# Patient Record
Sex: FEMALE | Race: WHITE | URBAN - METROPOLITAN AREA
[De-identification: names, ages, dates, MRNs, and addresses within clinical notes are randomized per-mention and may not be internally consistent; named-entity substitution may affect disease eponyms.]

---

## 2023-06-23 ENCOUNTER — OFFICE VISIT (OUTPATIENT)
Dept: GERIATRIC MEDICINE | Age: 63
End: 2023-06-23
Payer: MEDICARE

## 2023-06-23 DIAGNOSIS — R53.1 WEAKNESS: ICD-10-CM

## 2023-06-23 DIAGNOSIS — I10 PRIMARY HYPERTENSION: ICD-10-CM

## 2023-06-23 DIAGNOSIS — E03.9 HYPOTHYROIDISM, UNSPECIFIED TYPE: Primary | ICD-10-CM

## 2023-06-23 DIAGNOSIS — Z79.4 UNCONTROLLED TYPE 2 DIABETES MELLITUS WITH HYPERGLYCEMIA, WITH LONG-TERM CURRENT USE OF INSULIN (HCC): ICD-10-CM

## 2023-06-23 DIAGNOSIS — E11.65 UNCONTROLLED TYPE 2 DIABETES MELLITUS WITH HYPERGLYCEMIA, WITH LONG-TERM CURRENT USE OF INSULIN (HCC): ICD-10-CM

## 2023-06-23 PROCEDURE — 3046F HEMOGLOBIN A1C LEVEL >9.0%: CPT | Performed by: INTERNAL MEDICINE

## 2023-06-23 PROCEDURE — 99305 1ST NF CARE MODERATE MDM 35: CPT | Performed by: INTERNAL MEDICINE

## 2023-06-23 RX ORDER — LEVOTHYROXINE SODIUM 0.07 MG/1
75 TABLET ORAL DAILY
COMMUNITY

## 2023-06-23 RX ORDER — CHOLECALCIFEROL (VITAMIN D3) 50 MCG
2000 TABLET ORAL DAILY
COMMUNITY

## 2023-06-23 RX ORDER — LISINOPRIL 20 MG/1
20 TABLET ORAL DAILY
COMMUNITY

## 2023-06-23 RX ORDER — METOPROLOL SUCCINATE 25 MG/1
25 TABLET, EXTENDED RELEASE ORAL DAILY
COMMUNITY

## 2023-06-23 RX ORDER — AMITRIPTYLINE HYDROCHLORIDE 10 MG/1
5 TABLET, FILM COATED ORAL NIGHTLY
COMMUNITY

## 2023-06-23 RX ORDER — INSULIN GLARGINE 100 [IU]/ML
10 INJECTION, SOLUTION SUBCUTANEOUS EVERY MORNING
COMMUNITY

## 2023-06-23 RX ORDER — INSULIN LISPRO 100 [IU]/ML
2 INJECTION, SOLUTION INTRAVENOUS; SUBCUTANEOUS
COMMUNITY

## 2023-06-23 RX ORDER — CIPROFLOXACIN 500 MG/1
500 TABLET, FILM COATED ORAL 2 TIMES DAILY
COMMUNITY
Start: 2023-06-23 | End: 2023-06-24

## 2023-06-23 RX ORDER — GLIMEPIRIDE 4 MG/1
4 TABLET ORAL 2 TIMES DAILY
COMMUNITY

## 2023-06-23 RX ORDER — ATORVASTATIN CALCIUM 10 MG/1
10 TABLET, FILM COATED ORAL NIGHTLY
COMMUNITY

## 2023-06-26 LAB
AVERAGE GLUCOSE: 301
BASOPHILS ABSOLUTE: NORMAL
BASOPHILS RELATIVE PERCENT: 0.6 %
BUN BLDV-MCNC: 16 MG/DL
CALCIUM SERPL-MCNC: 9.1 MG/DL
CHLORIDE BLD-SCNC: 102 MMOL/L
CO2: 24 MMOL/L
CREAT SERPL-MCNC: 0.6 MG/DL
EGFR: NORMAL
EOSINOPHILS ABSOLUTE: 0.1 /ΜL
EOSINOPHILS RELATIVE PERCENT: 1.3 %
GLUCOSE BLD-MCNC: 160 MG/DL
HBA1C MFR BLD: 12.1 %
HCT VFR BLD CALC: 40.1 % (ref 36–46)
HEMOGLOBIN: 13.3 G/DL (ref 12–16)
LYMPHOCYTES ABSOLUTE: 3.2 /ΜL
LYMPHOCYTES RELATIVE PERCENT: 40.4 %
MCH RBC QN AUTO: 30.8 PG
MCHC RBC AUTO-ENTMCNC: 33.3 G/DL
MCV RBC AUTO: 92.4 FL
MONOCYTES ABSOLUTE: 0.5 /ΜL
MONOCYTES RELATIVE PERCENT: 6.4 %
NEUTROPHILS ABSOLUTE: 4 /ΜL
NEUTROPHILS RELATIVE PERCENT: 51.3 %
PLATELET # BLD: 355 K/ΜL
PMV BLD AUTO: 8.5 FL
POTASSIUM SERPL-SCNC: 3.9 MMOL/L
RBC # BLD: 4.33 10^6/ΜL
SODIUM BLD-SCNC: 139 MMOL/L
WBC # BLD: 7.8 10^3/ML

## 2023-07-03 ENCOUNTER — OFFICE VISIT (OUTPATIENT)
Dept: GERIATRIC MEDICINE | Age: 63
End: 2023-07-03
Payer: MEDICARE

## 2023-07-03 DIAGNOSIS — E03.9 HYPOTHYROIDISM, UNSPECIFIED TYPE: ICD-10-CM

## 2023-07-03 DIAGNOSIS — Z79.4 UNCONTROLLED TYPE 2 DIABETES MELLITUS WITH HYPERGLYCEMIA, WITH LONG-TERM CURRENT USE OF INSULIN (HCC): ICD-10-CM
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
## 2023-07-03 DIAGNOSIS — I10 PRIMARY HYPERTENSION: ICD-10-CM

## 2023-07-03 DIAGNOSIS — R53.1 WEAKNESS: ICD-10-CM

## 2023-07-03 DIAGNOSIS — N17.9 AKI (ACUTE KIDNEY INJURY) (HCC): Primary | ICD-10-CM

## 2023-07-03 DIAGNOSIS — E11.65 UNCONTROLLED TYPE 2 DIABETES MELLITUS WITH HYPERGLYCEMIA, WITH LONG-TERM CURRENT USE OF INSULIN (HCC): ICD-10-CM

## 2023-07-03 PROCEDURE — 99309 SBSQ NF CARE MODERATE MDM 30: CPT | Performed by: NURSE PRACTITIONER

## 2023-07-03 PROCEDURE — 3046F HEMOGLOBIN A1C LEVEL >9.0%: CPT | Performed by: NURSE PRACTITIONER

## 2023-07-10 PROBLEM — N17.9 AKI (ACUTE KIDNEY INJURY) (HCC): Status: ACTIVE | Noted: 2023-07-10

## 2023-07-10 PROBLEM — Z79.4 UNCONTROLLED TYPE 2 DIABETES MELLITUS WITH HYPERGLYCEMIA, WITH LONG-TERM CURRENT USE OF INSULIN (HCC): Status: ACTIVE | Noted: 2023-07-10

## 2023-07-10 PROBLEM — R53.1 WEAKNESS: Status: ACTIVE | Noted: 2023-07-10

## 2023-07-10 PROBLEM — N30.01 ACUTE CYSTITIS WITH HEMATURIA: Status: ACTIVE | Noted: 2023-07-10
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
## 2023-07-10 PROBLEM — E03.9 HYPOTHYROIDISM: Status: ACTIVE | Noted: 2023-07-10

## 2023-07-10 PROBLEM — E11.40 POORLY CONTROLLED TYPE 2 DIABETES MELLITUS WITH NEUROPATHY (HCC): Status: ACTIVE | Noted: 2023-07-10

## 2023-07-10 PROBLEM — I10 PRIMARY HYPERTENSION: Status: ACTIVE | Noted: 2023-07-10

## 2023-07-10 PROBLEM — E11.65 UNCONTROLLED TYPE 2 DIABETES MELLITUS WITH HYPERGLYCEMIA, WITHOUT LONG-TERM CURRENT USE OF INSULIN (HCC): Status: ACTIVE | Noted: 2023-07-10

## 2023-07-10 PROBLEM — E11.65 POORLY CONTROLLED TYPE 2 DIABETES MELLITUS WITH NEUROPATHY (HCC): Status: ACTIVE | Noted: 2023-07-10

## 2023-07-10 ASSESSMENT — ENCOUNTER SYMPTOMS
COUGH: 0
BACK PAIN: 1
CONSTIPATION: 1

## 2023-07-10 NOTE — PROGRESS NOTES
[address] [facility]. [place], [address] [facility]    7/3/2023    Tucker Feliz  is a 60 y.o. in the NF being seen for a f/u of   Chief Complaint   Patient presents with    Other     F/u new pt in rehab        HPI  The pt is here for rehab after their hospitalization 6/21 - 7/3 for JACOB, freq falls, diabetic neuropathy, UTI, weakness. Had MDI started in hospital. L 10u hs;  H 2u tid meals   She insists she is on lyrica. No response from Dr Regis Knight, will have nursing try to contact for orders on Lyrica. Palliative STOPPED the Lyrica several months ago. She also uses TENs unit for pain. They are up in wheel chair but using walker for 200' ambulation with therapy. And she states she has neuropathy pain in legs. Does not know dose or frequency. Has imbalance issues. They are eating and drinking OK per nursing. Have had no recent choking or dysphagia issues. NO agitation or unusual mental behavioral issues. PT reports they are progressing with ambulation. OT reports they are progressing with ADLs. Family supportive. Plans to go home after rehab. No past medical history on file. No past surgical history on file. No family history on file.   Social History     Socioeconomic History    Marital status:      Spouse name: Not on file    Number of children: Not on file    Years of education: Not on file    Highest education level: Not on file   Occupational History    Not on file   Tobacco Use    Smoking status: Not on file    Smokeless tobacco: Not on file   Substance and Sexual Activity    Alcohol use: Not on file    Drug use: Not on file    Sexual activity: Not on file   Other Topics Concern    Not on file   Social History Narrative    Not on file     Social Determinants of Health     Financial Resource Strain: Not on file   Food Insecurity: Not on file   Transportation Needs: Not on file   Physical Activity: Not on file   Stress: Not on file   Social

## 2023-07-11 NOTE — PROGRESS NOTES
Patient Name: Jarocho Kaiser    YOB: 1960  Medical Record Number: [id number]      History of Present Illness: This 58-year-old was admitted here after recent hospitalization with diabetes with neuropathy hypertension hypothyroidism. Patient recurrent falls weakness patient has any chest pain palpitation patient going consideration of pain patient will follow left hip was imaged for possible acute fracture. Patient did not have acute fracture on imaging. Patient blood sugar mildly elevated and A1c was above target. Patient is up titration of therapy including adding course of Lantus patient was stabilized transferred for ongoing course of care General debility weakness. Ongoing joint pain at this time. Review of Systems   Constitutional:  Positive for activity change and fatigue. HENT:  Negative for congestion. Respiratory:  Negative for cough. Cardiovascular:  Positive for leg swelling. Negative for chest pain. Gastrointestinal:  Positive for constipation. Musculoskeletal:  Positive for arthralgias, back pain, gait problem, joint swelling and myalgias. Neurological:  Positive for weakness. All other systems reviewed and are negative. Review of Systems: All 14 review of systems negative other than as stated above           No past medical history on file. No past surgical history on file. Current Outpatient Medications on File Prior to Visit   Medication Sig Dispense Refill    amitriptyline (ELAVIL) 10 MG tablet Take 0.5 tablets by mouth nightly Indications: Depression      atorvastatin (LIPITOR) 10 MG tablet Take 1 tablet by mouth nightly Indications: High Amount of Fats in the Blood      glimepiride (AMARYL) 4 MG tablet Take 1 tablet by mouth in the morning and 1 tablet in the evening. Indications: Type 2 Diabetes.       insulin glargine (LANTUS) 100 UNIT/ML injection vial Inject 10 Units into the skin every morning Indications: Type 2 Diabetes      insulin

## 2023-07-13 ENCOUNTER — OFFICE VISIT (OUTPATIENT)
Dept: GERIATRIC MEDICINE | Age: 63
End: 2023-07-13
Payer: MEDICARE

## 2023-07-13 DIAGNOSIS — F03.911 AGITATION DUE TO DEMENTIA (HCC): ICD-10-CM

## 2023-07-13 DIAGNOSIS — G62.9 NEUROPATHY: ICD-10-CM

## 2023-07-13 PROCEDURE — G0317 PR PROLONG NURSIN FAC EVAL 15M: HCPCS | Performed by: NURSE PRACTITIONER

## 2023-07-13 PROCEDURE — 99310 SBSQ NF CARE HIGH MDM 45: CPT | Performed by: NURSE PRACTITIONER

## 2023-07-18 PROBLEM — F03.911 AGITATION DUE TO DEMENTIA (HCC): Status: ACTIVE | Noted: 2023-07-18

## 2023-07-18 PROBLEM — G62.9 NEUROPATHY: Status: ACTIVE | Noted: 2023-07-18

## 2023-07-18 RX ORDER — PREGABALIN 50 MG/1
50 CAPSULE ORAL 2 TIMES DAILY
Qty: 60 CAPSULE | Refills: 2
Start: 2023-07-18 | End: 2023-10-16

## 2023-07-18 NOTE — PROGRESS NOTES
[address] [facility] [place]. [place], [place] [facility]    7/13/2023    Matty Morales  is a 60 y.o. in the [place] being seen for    Chief Complaint   Patient presents with    Medication Problem       HPI patient was admitted for rehabilitation now she is here for long-term care because of her dementia she is incontinent of bowel and bladder has neuropathy in her feet from her diabetes  She is agitated at times use forgetful at times  Nursing reported patient drove to the physician's office Dr. Mary Hanna obtained Lyrica prescription and she told them she filled the prescription and would be taking it on her own because it was not ordered  Orders were written last week for nursing to contact the physician office for clarification of the Lyrica because patient does not remember the dosage  Patient is now denying that she obtained the prescription she is calling me a liar then she was swearing at me and she was accusing me of calling her a liar she is very agitated irritable  I instructed the patient I am happy to talk to the physician's office I am happy to talk to the pharmacy  When I contacted drug Alaina Serna they indicated that there is no prescription patient was not on Lyrica  Upon further evaluation they states she was on Lyrica back in March and has not received a prescription since then I did run an [facility] report and it was noted that she received her prescription back in March and nothing since then  Also [facility] report today did not indicate that patient had any Lyrica filled    History reviewed. No pertinent past medical history. History reviewed. No pertinent surgical history. History reviewed. No pertinent family history.   Social History     Socioeconomic History    Marital status:      Spouse name: Not on file    Number of children: Not on file    Years of education: Not on file    Highest education level: Not on file   Occupational History    Not on file   Tobacco Use    Smoking status:

## 2023-07-21 ENCOUNTER — OFFICE VISIT (OUTPATIENT)
Dept: GERIATRIC MEDICINE | Age: 63
End: 2023-07-21

## 2023-07-21 DIAGNOSIS — R53.1 WEAKNESS: ICD-10-CM

## 2023-07-21 DIAGNOSIS — E11.40 POORLY CONTROLLED TYPE 2 DIABETES MELLITUS WITH NEUROPATHY (HCC): ICD-10-CM

## 2023-07-21 DIAGNOSIS — I10 PRIMARY HYPERTENSION: Primary | ICD-10-CM

## 2023-07-21 DIAGNOSIS — E11.65 POORLY CONTROLLED TYPE 2 DIABETES MELLITUS WITH NEUROPATHY (HCC): ICD-10-CM

## 2023-07-21 DIAGNOSIS — F03.911 AGITATION DUE TO DEMENTIA (HCC): ICD-10-CM

## 2023-07-24 ENCOUNTER — OFFICE VISIT (OUTPATIENT)
Dept: GERIATRIC MEDICINE | Age: 63
End: 2023-07-24
Payer: MEDICARE

## 2023-07-24 DIAGNOSIS — R11.0 NAUSEA: Primary | ICD-10-CM

## 2023-07-24 DIAGNOSIS — E11.65 POORLY CONTROLLED TYPE 2 DIABETES MELLITUS WITH NEUROPATHY (HCC): ICD-10-CM

## 2023-07-24 DIAGNOSIS — E11.40 POORLY CONTROLLED TYPE 2 DIABETES MELLITUS WITH NEUROPATHY (HCC): ICD-10-CM

## 2023-07-24 PROCEDURE — 99309 SBSQ NF CARE MODERATE MDM 30: CPT | Performed by: NURSE PRACTITIONER

## 2023-07-24 PROCEDURE — 3046F HEMOGLOBIN A1C LEVEL >9.0%: CPT | Performed by: NURSE PRACTITIONER

## 2023-07-26 NOTE — PROGRESS NOTES
Activity: Not on file   Stress: Not on file   Social Connections: Not on file   Intimate Partner Violence: Not on file   Housing Stability: Not on file       Allergies: Codeine  NF MEDICATIONS REVIEWED AND ALLERGIES REVIEWED IN NF CHART  Current Outpatient Medications on File Prior to Visit   Medication Sig Dispense Refill    pregabalin (LYRICA) 50 MG capsule Take 1 capsule by mouth 2 times daily for 90 days. Max Daily Amount: 100 mg 60 capsule 2    amitriptyline (ELAVIL) 10 MG tablet Take 0.5 tablets by mouth nightly Indications: Depression      atorvastatin (LIPITOR) 10 MG tablet Take 1 tablet by mouth nightly Indications: High Amount of Fats in the Blood      glimepiride (AMARYL) 4 MG tablet Take 1 tablet by mouth in the morning and 1 tablet in the evening. Indications: Type 2 Diabetes. insulin glargine (LANTUS) 100 UNIT/ML injection vial Inject 10 Units into the skin every morning Indications: Type 2 Diabetes      insulin lispro (HUMALOG) 100 UNIT/ML SOLN injection vial Inject 2 Units into the skin 3 times daily (with meals) Indications: Type 2 Diabetes      levothyroxine (SYNTHROID) 75 MCG tablet Take 1 tablet by mouth Daily Indications: Underactive Thyroid      lisinopril (PRINIVIL;ZESTRIL) 20 MG tablet Take 1 tablet by mouth daily Indications: High Blood Pressure Disorder      metFORMIN (GLUCOPHAGE) 500 MG tablet Take 1 tablet by mouth 2 times daily (with meals) Indications: Type 2 Diabetes      metoprolol succinate (TOPROL XL) 25 MG extended release tablet Take 1 tablet by mouth daily Indications: High Blood Pressure Disorder      vitamin D (CHOLECALCIFEROL) 50 MCG (2000 UT) TABS tablet Take 1 tablet by mouth daily Indications: Nutritional Support       No current facility-administered medications on file prior to visit. ROS:   Constitutional: There are no recent reports of behavioral issues,  ate less x 3 days with nausea.     Respiratory: denies SOB  Cardiovascular: denies CP  GI/:

## 2023-07-27 ENCOUNTER — HOSPITAL ENCOUNTER (EMERGENCY)
Age: 63
Discharge: OTHER INSTITUTION | End: 2023-07-28
Payer: MEDICARE

## 2023-07-27 DIAGNOSIS — R11.0 NAUSEA: ICD-10-CM

## 2023-07-27 DIAGNOSIS — R68.89 GENERAL ILL FEELING: Primary | ICD-10-CM

## 2023-07-27 DIAGNOSIS — K59.00 CONSTIPATION, UNSPECIFIED CONSTIPATION TYPE: ICD-10-CM

## 2023-07-27 PROCEDURE — 83690 ASSAY OF LIPASE: CPT

## 2023-07-27 PROCEDURE — 80053 COMPREHEN METABOLIC PANEL: CPT

## 2023-07-27 PROCEDURE — 36415 COLL VENOUS BLD VENIPUNCTURE: CPT

## 2023-07-27 PROCEDURE — 83735 ASSAY OF MAGNESIUM: CPT

## 2023-07-27 PROCEDURE — 99285 EMERGENCY DEPT VISIT HI MDM: CPT

## 2023-07-27 PROCEDURE — 81001 URINALYSIS AUTO W/SCOPE: CPT

## 2023-07-27 PROCEDURE — 83605 ASSAY OF LACTIC ACID: CPT

## 2023-07-27 PROCEDURE — 85025 COMPLETE CBC W/AUTO DIFF WBC: CPT

## 2023-07-27 PROCEDURE — 96374 THER/PROPH/DIAG INJ IV PUSH: CPT

## 2023-07-27 RX ORDER — KETOROLAC TROMETHAMINE 30 MG/ML
30 INJECTION, SOLUTION INTRAMUSCULAR; INTRAVENOUS ONCE
Status: DISCONTINUED | OUTPATIENT
Start: 2023-07-27 | End: 2023-07-27

## 2023-07-27 RX ORDER — 0.9 % SODIUM CHLORIDE 0.9 %
1000 INTRAVENOUS SOLUTION INTRAVENOUS ONCE
Status: COMPLETED | OUTPATIENT
Start: 2023-07-27 | End: 2023-07-28

## 2023-07-27 RX ORDER — ONDANSETRON 2 MG/ML
4 INJECTION INTRAMUSCULAR; INTRAVENOUS ONCE
Status: COMPLETED | OUTPATIENT
Start: 2023-07-27 | End: 2023-07-28

## 2023-07-27 ASSESSMENT — PAIN SCALES - GENERAL: PAINLEVEL_OUTOF10: 10

## 2023-07-27 ASSESSMENT — PAIN - FUNCTIONAL ASSESSMENT: PAIN_FUNCTIONAL_ASSESSMENT: 0-10

## 2023-07-27 ASSESSMENT — LIFESTYLE VARIABLES: HOW OFTEN DO YOU HAVE A DRINK CONTAINING ALCOHOL: NEVER

## 2023-07-27 ASSESSMENT — PAIN DESCRIPTION - DESCRIPTORS: DESCRIPTORS: SPASM

## 2023-07-27 ASSESSMENT — PAIN DESCRIPTION - LOCATION: LOCATION: ABDOMEN

## 2023-07-27 ASSESSMENT — ENCOUNTER SYMPTOMS
VOMITING: 1
CONSTIPATION: 1
DIARRHEA: 1
ABDOMINAL PAIN: 1
NAUSEA: 1

## 2023-07-28 ENCOUNTER — APPOINTMENT (OUTPATIENT)
Dept: CT IMAGING | Age: 63
End: 2023-07-28
Payer: MEDICARE

## 2023-07-28 VITALS
HEART RATE: 73 BPM | TEMPERATURE: 97.8 F | WEIGHT: 200 LBS | SYSTOLIC BLOOD PRESSURE: 126 MMHG | RESPIRATION RATE: 18 BRPM | OXYGEN SATURATION: 97 % | BODY MASS INDEX: 30.31 KG/M2 | HEIGHT: 68 IN | DIASTOLIC BLOOD PRESSURE: 69 MMHG

## 2023-07-28 LAB
ALBUMIN SERPL-MCNC: 3.6 G/DL (ref 3.5–4.6)
ALP SERPL-CCNC: 93 U/L (ref 40–130)
ALT SERPL-CCNC: 19 U/L (ref 0–33)
ANION GAP SERPL CALCULATED.3IONS-SCNC: 10 MEQ/L (ref 9–15)
AST SERPL-CCNC: 19 U/L (ref 0–35)
BACTERIA URNS QL MICRO: ABNORMAL /HPF
BASOPHILS # BLD: 0.1 K/UL (ref 0–0.2)
BASOPHILS NFR BLD: 0.9 %
BILIRUB SERPL-MCNC: <0.2 MG/DL (ref 0.2–0.7)
BILIRUB UR QL STRIP: NEGATIVE
BUN SERPL-MCNC: 23 MG/DL (ref 8–23)
CALCIUM SERPL-MCNC: 9 MG/DL (ref 8.5–9.9)
CHLORIDE SERPL-SCNC: 107 MEQ/L (ref 95–107)
CLARITY UR: ABNORMAL
CO2 SERPL-SCNC: 25 MEQ/L (ref 20–31)
COLOR UR: YELLOW
CREAT SERPL-MCNC: 0.57 MG/DL (ref 0.5–0.9)
EOSINOPHIL # BLD: 0.3 K/UL (ref 0–0.7)
EOSINOPHIL NFR BLD: 2.8 %
EPI CELLS #/AREA URNS AUTO: ABNORMAL /HPF (ref 0–5)
ERYTHROCYTE [DISTWIDTH] IN BLOOD BY AUTOMATED COUNT: 13.7 % (ref 11.5–14.5)
GLOBULIN SER CALC-MCNC: 2.8 G/DL (ref 2.3–3.5)
GLUCOSE SERPL-MCNC: 222 MG/DL (ref 70–99)
GLUCOSE UR STRIP-MCNC: 250 MG/DL
HCT VFR BLD AUTO: 36.9 % (ref 37–47)
HGB BLD-MCNC: 12.1 G/DL (ref 12–16)
HGB UR QL STRIP: ABNORMAL
HYALINE CASTS #/AREA URNS AUTO: ABNORMAL /HPF (ref 0–5)
KETONES UR STRIP-MCNC: NEGATIVE MG/DL
LACTATE BLDV-SCNC: 1.6 MMOL/L (ref 0.5–2.2)
LEUKOCYTE ESTERASE UR QL STRIP: ABNORMAL
LIPASE SERPL-CCNC: 12 U/L (ref 12–95)
LYMPHOCYTES # BLD: 2.4 K/UL (ref 1–4.8)
LYMPHOCYTES NFR BLD: 23.5 %
MAGNESIUM SERPL-MCNC: 1.9 MG/DL (ref 1.7–2.4)
MCH RBC QN AUTO: 29.5 PG (ref 27–31.3)
MCHC RBC AUTO-ENTMCNC: 32.7 % (ref 33–37)
MCV RBC AUTO: 90.2 FL (ref 79.4–94.8)
MONOCYTES # BLD: 1.1 K/UL (ref 0.2–0.8)
MONOCYTES NFR BLD: 10.7 %
NEUTROPHILS # BLD: 6.3 K/UL (ref 1.4–6.5)
NEUTS SEG NFR BLD: 62.1 %
NITRITE UR QL STRIP: NEGATIVE
PH UR STRIP: 5.5 [PH] (ref 5–9)
PLATELET # BLD AUTO: 358 K/UL (ref 130–400)
POTASSIUM SERPL-SCNC: 4.9 MEQ/L (ref 3.4–4.9)
PROT SERPL-MCNC: 6.4 G/DL (ref 6.3–8)
PROT UR STRIP-MCNC: ABNORMAL MG/DL
RBC # BLD AUTO: 4.1 M/UL (ref 4.2–5.4)
RBC #/AREA URNS HPF: ABNORMAL /HPF (ref 0–2)
SODIUM SERPL-SCNC: 142 MEQ/L (ref 135–144)
SP GR UR STRIP: 1.02 (ref 1–1.03)
URINE REFLEX TO CULTURE: ABNORMAL
UROBILINOGEN UR STRIP-ACNC: 0.2 E.U./DL
WBC # BLD AUTO: 10.2 K/UL (ref 4.8–10.8)
WBC #/AREA URNS HPF: ABNORMAL /HPF (ref 0–5)

## 2023-07-28 PROCEDURE — 2580000003 HC RX 258

## 2023-07-28 PROCEDURE — 96374 THER/PROPH/DIAG INJ IV PUSH: CPT

## 2023-07-28 PROCEDURE — 96361 HYDRATE IV INFUSION ADD-ON: CPT

## 2023-07-28 PROCEDURE — 6360000004 HC RX CONTRAST MEDICATION

## 2023-07-28 PROCEDURE — 74177 CT ABD & PELVIS W/CONTRAST: CPT

## 2023-07-28 PROCEDURE — 6360000002 HC RX W HCPCS

## 2023-07-28 RX ORDER — ONDANSETRON 4 MG/1
4 TABLET, ORALLY DISINTEGRATING ORAL 3 TIMES DAILY PRN
Qty: 20 TABLET | Refills: 0 | Status: SHIPPED | OUTPATIENT
Start: 2023-07-28 | End: 2023-08-17

## 2023-07-28 RX ORDER — DICYCLOMINE HCL 20 MG
20 TABLET ORAL 4 TIMES DAILY
Qty: 12 TABLET | Refills: 0 | Status: SHIPPED | OUTPATIENT
Start: 2023-07-28

## 2023-07-28 RX ADMIN — SODIUM CHLORIDE 1000 ML: 9 INJECTION, SOLUTION INTRAVENOUS at 00:30

## 2023-07-28 RX ADMIN — ONDANSETRON 4 MG: 2 INJECTION INTRAMUSCULAR; INTRAVENOUS at 00:30

## 2023-07-28 RX ADMIN — IOPAMIDOL 50 ML: 612 INJECTION, SOLUTION INTRAVENOUS at 02:49

## 2023-07-28 NOTE — ED PROVIDER NOTES
hematuria 7/10/2023    Agitation due to dementia [facility]) 7/18/2023    JACOB (acute kidney injury) ([address]) 7/10/2023    Fall 7/10/2023    Hypothyroidism 7/10/2023    Neuropathy 7/18/2023    Poorly controlled type 2 diabetes mellitus with neuropathy ([address]) 7/10/2023    Primary hypertension 7/10/2023    Uncontrolled type 2 diabetes mellitus with hyperglycemia, with long-term current use of insulin ([address]) 7/10/2023    Weakness 7/10/2023         SURGICAL HISTORY     History reviewed. No pertinent surgical history. CURRENT MEDICATIONS       Previous Medications    AMITRIPTYLINE (ELAVIL) 10 MG TABLET    Take 0.5 tablets by mouth nightly Indications: Depression    ATORVASTATIN (LIPITOR) 10 MG TABLET    Take 1 tablet by mouth nightly Indications: High Amount of Fats in the Blood    GLIMEPIRIDE (AMARYL) 4 MG TABLET    Take 1 tablet by mouth in the morning and 1 tablet in the evening. Indications: Type 2 Diabetes. INSULIN GLARGINE (LANTUS) 100 UNIT/ML INJECTION VIAL    Inject 10 Units into the skin every morning Indications: Type 2 Diabetes    INSULIN LISPRO (HUMALOG) 100 UNIT/ML SOLN INJECTION VIAL    Inject 2 Units into the skin 3 times daily (with meals) Indications: Type 2 Diabetes    LEVOTHYROXINE (SYNTHROID) 75 MCG TABLET    Take 1 tablet by mouth Daily Indications: Underactive Thyroid    LISINOPRIL (PRINIVIL;ZESTRIL) 20 MG TABLET    Take 1 tablet by mouth daily Indications: High Blood Pressure Disorder    METFORMIN (GLUCOPHAGE) 500 MG TABLET    Take 1 tablet by mouth 2 times daily (with meals) Indications: Type 2 Diabetes    METOPROLOL SUCCINATE (TOPROL XL) 25 MG EXTENDED RELEASE TABLET    Take 1 tablet by mouth daily Indications: High Blood Pressure Disorder    PREGABALIN (LYRICA) 50 MG CAPSULE    Take 1 capsule by mouth 2 times daily for 90 days.  Max Daily Amount: 100 mg    VITAMIN D (CHOLECALCIFEROL) 50 MCG (2000 UT) TABS TABLET    Take 1 tablet by mouth daily Indications: Nutritional Support       ALLERGIES [id number]

## 2023-07-28 NOTE — ED NOTES
Discharge education reviewed. Patient instructed to follow up with PCP and come back to the ED with any new or worsening symptoms. No questions or concerns at this time. Patient denies nausea or pain at this time.        Katia Jaffe RN  07/28/23 [id number]

## 2023-08-01 ENCOUNTER — OFFICE VISIT (OUTPATIENT)
Dept: GERIATRIC MEDICINE | Age: 63
End: 2023-08-01

## 2023-08-01 DIAGNOSIS — G62.9 NEUROPATHY: ICD-10-CM

## 2023-08-01 DIAGNOSIS — E11.65 UNCONTROLLED TYPE 2 DIABETES MELLITUS WITH HYPERGLYCEMIA, WITH LONG-TERM CURRENT USE OF INSULIN (HCC): ICD-10-CM

## 2023-08-01 DIAGNOSIS — F02.80 ALZHEIMER DISEASE (HCC): ICD-10-CM

## 2023-08-01 DIAGNOSIS — Z79.4 UNCONTROLLED TYPE 2 DIABETES MELLITUS WITH HYPERGLYCEMIA, WITH LONG-TERM CURRENT USE OF INSULIN (HCC): ICD-10-CM

## 2023-08-01 DIAGNOSIS — G30.9 ALZHEIMER DISEASE (HCC): ICD-10-CM

## 2023-08-01 DIAGNOSIS — I10 PRIMARY HYPERTENSION: Primary | ICD-10-CM
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
## 2023-08-14 LAB
BILIRUBIN, URINE: NEGATIVE
BLOOD, URINE: NEGATIVE
CLARITY: ABNORMAL
COLOR: YELLOW
GLUCOSE URINE: ABNORMAL
KETONES, URINE: NEGATIVE
LEUKOCYTE ESTERASE, URINE: ABNORMAL
NITRITE, URINE: NEGATIVE
PH UA: 5 (ref 4.5–8)
PROTEIN UA: NEGATIVE
SPECIFIC GRAVITY, URINE: 1.02
UROBILINOGEN, URINE: NORMAL

## 2023-08-21 ENCOUNTER — OFFICE VISIT (OUTPATIENT)
Dept: GERIATRIC MEDICINE | Age: 63
End: 2023-08-21
Payer: MEDICARE

## 2023-08-21 DIAGNOSIS — F03.911 AGITATION DUE TO DEMENTIA (HCC): ICD-10-CM

## 2023-08-21 DIAGNOSIS — I10 PRIMARY HYPERTENSION: Primary | ICD-10-CM

## 2023-08-21 DIAGNOSIS — E11.65 UNCONTROLLED TYPE 2 DIABETES MELLITUS WITH HYPERGLYCEMIA, WITH LONG-TERM CURRENT USE OF INSULIN (HCC): ICD-10-CM

## 2023-08-21 DIAGNOSIS — Z79.4 UNCONTROLLED TYPE 2 DIABETES MELLITUS WITH HYPERGLYCEMIA, WITH LONG-TERM CURRENT USE OF INSULIN (HCC): ICD-10-CM

## 2023-08-21 DIAGNOSIS — G62.9 NEUROPATHY: ICD-10-CM

## 2023-08-21 PROCEDURE — 99309 SBSQ NF CARE MODERATE MDM 30: CPT | Performed by: INTERNAL MEDICINE

## 2023-08-21 PROCEDURE — 3046F HEMOGLOBIN A1C LEVEL >9.0%: CPT | Performed by: INTERNAL MEDICINE

## 2023-08-31 NOTE — PROGRESS NOTES
SUBJECTIVE:  58-year-old woman seen for follow-up visit for her diabetes hypertension neuropathy dementia patient has no acute psychosis acute fluctuations blood sugars reviewed with nursing staff no recent emesis fevers or chills      ROS: Limited by cognition  The rest of the 14 point ROS negative    PHYSICAL EXAM: VSS per facility record  Alert orient x2 pupils reactive oral mucosa moist chest no crackles or wheezing cardiovascular showed a regular rate abdomen soft nontender EXTR with trace edema skin showed no new rash    ASSESSMENT & PLAN:   Diagnosis Orders   1. Primary hypertension        2. Uncontrolled type 2 diabetes mellitus with hyperglycemia, with long-term current use of insulin ([address])        3. Neuropathy        4. Alzheimer disease ([address])          Monitor systolic pressure monitoring blood sugars follow-up A1c pending continue with supportive care reorientation as able. Past Medical History:   Diagnosis Date    Acute cystitis with hematuria 7/10/2023    Agitation due to dementia [facility]) 7/18/2023    JACOB (acute kidney injury) ([address]) 7/10/2023    Fall 7/10/2023    Hypothyroidism 7/10/2023    Neuropathy 7/18/2023    Poorly controlled type 2 diabetes mellitus with neuropathy ([address]) 7/10/2023    Primary hypertension 7/10/2023    Uncontrolled type 2 diabetes mellitus with hyperglycemia, with long-term current use of insulin ([address]) 7/10/2023    Weakness 7/10/2023         No past surgical history on file. Current Outpatient Medications on File Prior to Visit   Medication Sig Dispense Refill    dicyclomine (BENTYL) 20 MG tablet Take 1 tablet by mouth 4 times daily 12 tablet 0    pregabalin (LYRICA) 50 MG capsule Take 1 capsule by mouth 2 times daily for 90 days.  Max Daily Amount: 100 mg 60 capsule 2    amitriptyline (ELAVIL) 10 MG tablet Take 0.5 tablets by mouth nightly Indications: Depression      atorvastatin (LIPITOR) 10 MG tablet Take 1 tablet by mouth nightly Indications: High Amount of Fats

## 2023-09-13 ENCOUNTER — OFFICE VISIT (OUTPATIENT)
Dept: GERIATRIC MEDICINE | Age: 63
End: 2023-09-13
Payer: MEDICARE

## 2023-09-13 DIAGNOSIS — E11.65 UNCONTROLLED TYPE 2 DIABETES MELLITUS WITH HYPERGLYCEMIA, WITH LONG-TERM CURRENT USE OF INSULIN (HCC): ICD-10-CM

## 2023-09-13 DIAGNOSIS — E03.9 HYPOTHYROIDISM, UNSPECIFIED TYPE: ICD-10-CM

## 2023-09-13 DIAGNOSIS — Z79.4 UNCONTROLLED TYPE 2 DIABETES MELLITUS WITH HYPERGLYCEMIA, WITH LONG-TERM CURRENT USE OF INSULIN (HCC): ICD-10-CM

## 2023-09-13 DIAGNOSIS — I10 PRIMARY HYPERTENSION: Primary | ICD-10-CM

## 2023-09-13 PROCEDURE — 99309 SBSQ NF CARE MODERATE MDM 30: CPT | Performed by: INTERNAL MEDICINE

## 2023-09-13 PROCEDURE — 3046F HEMOGLOBIN A1C LEVEL >9.0%: CPT | Performed by: INTERNAL MEDICINE

## 2023-09-18 NOTE — PROGRESS NOTES
Housing Stability: Not on file         Lab Results   Component Value Date    LABA1C 12.1 06/26/2023     No results found for: \"EAG\"    Lab Results   Component Value Date/Time     07/27/2023 11:30 PM    K 4.9 07/27/2023 11:30 PM     07/27/2023 11:30 PM    CO2 25 07/27/2023 11:30 PM    BUN 23 07/27/2023 11:30 PM    CREATININE 0.57 07/27/2023 11:30 PM    GLUCOSE 222 07/27/2023 11:30 PM    CALCIUM 9.0 07/27/2023 11:30 PM        No results found for: \"CHOL\"  No results found for: \"TRIG\"  No results found for: \"HDL\"  No results found for: \"LDLCHOLESTEROL\", \"LDLCALC\"  No results found for: \"LABVLDL\", \"VLDL\"  No results found for: \"CHOLHDLRATIO\"    Lab Results   Component Value Date    TSH 2.580 03/20/2015       Lab Results   Component Value Date    WBC 10.2 07/27/2023    HGB 12.1 07/27/2023    HCT 36.9 (L) 07/27/2023    MCV 90.2 07/27/2023     07/27/2023       Please note orders entered on site at facility after discussion with appropriate facility nursing/therapy/ / nutritional staff. Current longstanding medical problems and acute medical issues addressed with staff. Available data and data elements in on site paper chart reviewed and analyzed. Current external consultant notes reviewed in on site chart. Ordered laboratory testing and imaging will be reviewed when available.

## 2023-09-25 ENCOUNTER — OFFICE VISIT (OUTPATIENT)
Dept: GERIATRIC MEDICINE | Age: 63
End: 2023-09-25

## 2023-09-25 DIAGNOSIS — Z79.4 UNCONTROLLED TYPE 2 DIABETES MELLITUS WITH HYPERGLYCEMIA, WITH LONG-TERM CURRENT USE OF INSULIN (HCC): ICD-10-CM

## 2023-09-25 DIAGNOSIS — K58.0 IRRITABLE BOWEL SYNDROME WITH DIARRHEA: Primary | ICD-10-CM

## 2023-09-25 DIAGNOSIS — R63.5 WEIGHT GAIN: ICD-10-CM

## 2023-09-25 DIAGNOSIS — E11.65 UNCONTROLLED TYPE 2 DIABETES MELLITUS WITH HYPERGLYCEMIA, WITH LONG-TERM CURRENT USE OF INSULIN (HCC): ICD-10-CM

## 2023-09-28 NOTE — PROGRESS NOTES
SUBJECTIVE:  This 76-year-old woman seen problems for hypertension hypothyroidism diabetes notes no hypoglycemia acute pain crisis no bleeding diathesis no recent change in her bowel bladder habits no functional decline      ROS: Coughing intermittently  The rest of the 14 point ROS negative    PHYSICAL EXAM: VSS per facility record  Pupils are small but reactive oral mucosa moist chest showed no crackles no wheezing cardiovascular showed a regular rate abdomen soft nontender extremity trace edema    ASSESSMENT & PLAN:   Diagnosis Orders   1. Primary hypertension        2. Hypothyroidism, unspecified type        3. Uncontrolled type 2 diabetes mellitus with hyperglycemia, with long-term current use of insulin ([facility])          Pressure orthostasis monitor blood sugars follow-up A1c is pending. Repeat TSH is pending. Past Medical History:   Diagnosis Date    Acute cystitis with hematuria 7/10/2023    Agitation due to dementia [facility]) 7/18/2023    JACOB (acute kidney injury) ([address]) 7/10/2023    Fall 7/10/2023    Hypothyroidism 7/10/2023    Neuropathy 7/18/2023    Poorly controlled type 2 diabetes mellitus with neuropathy ([address]) 7/10/2023    Primary hypertension 7/10/2023    Uncontrolled type 2 diabetes mellitus with hyperglycemia, with long-term current use of insulin ([address]) 7/10/2023    Weakness 7/10/2023         No past surgical history on file. Current Outpatient Medications on File Prior to Visit   Medication Sig Dispense Refill    dicyclomine (BENTYL) 20 MG tablet Take 1 tablet by mouth 4 times daily 12 tablet 0    pregabalin (LYRICA) 50 MG capsule Take 1 capsule by mouth 2 times daily for 90 days.  Max Daily Amount: 100 mg 60 capsule 2    amitriptyline (ELAVIL) 10 MG tablet Take 0.5 tablets by mouth nightly Indications: Depression      atorvastatin (LIPITOR) 10 MG tablet Take 1 tablet by mouth nightly Indications: High Amount of Fats in the Blood      glimepiride (AMARYL) 4 MG tablet Take 1 tablet by mouth

## 2023-09-29 ENCOUNTER — OFFICE VISIT (OUTPATIENT)
Dept: GERIATRIC MEDICINE | Age: 63
End: 2023-09-29

## 2023-09-29 VITALS — WEIGHT: 263 LBS | BODY MASS INDEX: 39.99 KG/M2

## 2023-09-29 DIAGNOSIS — E11.65 POORLY CONTROLLED TYPE 2 DIABETES MELLITUS WITH NEUROPATHY (HCC): ICD-10-CM

## 2023-09-29 DIAGNOSIS — I10 PRIMARY HYPERTENSION: ICD-10-CM

## 2023-09-29 DIAGNOSIS — E11.40 POORLY CONTROLLED TYPE 2 DIABETES MELLITUS WITH NEUROPATHY (HCC): ICD-10-CM

## 2023-09-29 DIAGNOSIS — E03.9 HYPOTHYROIDISM, UNSPECIFIED TYPE: Primary | ICD-10-CM

## 2023-09-29 RX ORDER — SEMAGLUTIDE 1.34 MG/ML
0.25 INJECTION, SOLUTION SUBCUTANEOUS WEEKLY
Qty: 1 ADJUSTABLE DOSE PRE-FILLED PEN SYRINGE | Refills: 3
Start: 2023-09-29

## 2023-09-29 NOTE — PROGRESS NOTES
[address] [facility]. [place], [address] [facility]    9/25/2023    Carl Buitrago  is a 60 y.o. in the  being seen for    Chief Complaint   Patient presents with    1 Month Follow-Up     IBS weight gain       HPI DM2 poorly controlled recent past, on insulin now and c/o its making her gain weight  Last 3 days ran out of pads is incontinent of stool with diarrhea from her IBS. Noted this issue for 3 days . Wt gain 40lb over last month per pt reports  Pt also started Lyrica last 2 months which causes wt gain. The pt remains in LTC for care home care. Past Medical History:   Diagnosis Date    Acute cystitis with hematuria 7/10/2023    Agitation due to dementia [facility]) 7/18/2023    JACOB (acute kidney injury) ([address]) 7/10/2023    Fall 7/10/2023    Hypothyroidism 7/10/2023    Neuropathy 7/18/2023    Poorly controlled type 2 diabetes mellitus with neuropathy ([address]) 7/10/2023    Primary hypertension 7/10/2023    Uncontrolled type 2 diabetes mellitus with hyperglycemia, with long-term current use of insulin ([address]) 7/10/2023    Weakness 7/10/2023     History reviewed. No pertinent surgical history. History reviewed. No pertinent family history.   Social History     Socioeconomic History    Marital status:      Spouse name: Not on file    Number of children: Not on file    Years of education: Not on file    Highest education level: Not on file   Occupational History    Not on file   Tobacco Use    Smoking status: Never    Smokeless tobacco: Never   Substance and Sexual Activity    Alcohol use: Not on file    Drug use: Not on file    Sexual activity: Not on file   Other Topics Concern    Not on file   Social History Narrative    Not on file     Social Determinants of Health     Financial Resource Strain: Not on file   Food Insecurity: Not on file   Transportation Needs: Not on file   Physical Activity: Not on file   Stress: Not on file   Social Connections: Not on file   Intimate

## 2023-10-02 ENCOUNTER — OFFICE VISIT (OUTPATIENT)
Dept: GERIATRIC MEDICINE | Age: 63
End: 2023-10-02

## 2023-10-02 DIAGNOSIS — Z79.4 UNCONTROLLED TYPE 2 DIABETES MELLITUS WITH HYPERGLYCEMIA, WITH LONG-TERM CURRENT USE OF INSULIN (HCC): ICD-10-CM

## 2023-10-02 DIAGNOSIS — I10 PRIMARY HYPERTENSION: Primary | ICD-10-CM

## 2023-10-02 DIAGNOSIS — G62.9 NEUROPATHY: ICD-10-CM

## 2023-10-02 DIAGNOSIS — E11.65 UNCONTROLLED TYPE 2 DIABETES MELLITUS WITH HYPERGLYCEMIA, WITH LONG-TERM CURRENT USE OF INSULIN (HCC): ICD-10-CM

## 2023-10-06 ENCOUNTER — OFFICE VISIT (OUTPATIENT)
Dept: GERIATRIC MEDICINE | Age: 63
End: 2023-10-06

## 2023-10-06 DIAGNOSIS — E11.65 UNCONTROLLED TYPE 2 DIABETES MELLITUS WITH HYPERGLYCEMIA, WITH LONG-TERM CURRENT USE OF INSULIN (HCC): ICD-10-CM

## 2023-10-06 DIAGNOSIS — Z79.4 UNCONTROLLED TYPE 2 DIABETES MELLITUS WITH HYPERGLYCEMIA, WITH LONG-TERM CURRENT USE OF INSULIN (HCC): ICD-10-CM

## 2023-10-06 DIAGNOSIS — I10 PRIMARY HYPERTENSION: Primary | ICD-10-CM

## 2023-10-06 DIAGNOSIS — G62.9 NEUROPATHY: ICD-10-CM

## 2023-10-10 ENCOUNTER — OFFICE VISIT (OUTPATIENT)
Dept: GERIATRIC MEDICINE | Age: 63
End: 2023-10-10
Payer: MEDICARE

## 2023-10-10 DIAGNOSIS — E11.65 UNCONTROLLED TYPE 2 DIABETES MELLITUS WITH HYPERGLYCEMIA, WITH LONG-TERM CURRENT USE OF INSULIN (HCC): ICD-10-CM

## 2023-10-10 DIAGNOSIS — Z79.4 UNCONTROLLED TYPE 2 DIABETES MELLITUS WITH HYPERGLYCEMIA, WITH LONG-TERM CURRENT USE OF INSULIN (HCC): ICD-10-CM

## 2023-10-10 DIAGNOSIS — I10 PRIMARY HYPERTENSION: ICD-10-CM

## 2023-10-10 DIAGNOSIS — E03.9 HYPOTHYROIDISM, UNSPECIFIED TYPE: Primary | ICD-10-CM

## 2023-10-10 PROCEDURE — G8484 FLU IMMUNIZE NO ADMIN: HCPCS | Performed by: INTERNAL MEDICINE

## 2023-10-10 PROCEDURE — 99309 SBSQ NF CARE MODERATE MDM 30: CPT | Performed by: INTERNAL MEDICINE

## 2023-10-10 PROCEDURE — 3044F HG A1C LEVEL LT 7.0%: CPT | Performed by: INTERNAL MEDICINE

## 2023-10-12 ENCOUNTER — OFFICE VISIT (OUTPATIENT)
Dept: GERIATRIC MEDICINE | Age: 63
End: 2023-10-12

## 2023-10-12 DIAGNOSIS — K58.0 IRRITABLE BOWEL SYNDROME WITH DIARRHEA: Primary | ICD-10-CM

## 2023-10-24 ENCOUNTER — OFFICE VISIT (OUTPATIENT)
Dept: GERIATRIC MEDICINE | Age: 63
End: 2023-10-24
Payer: MEDICARE

## 2023-10-24 DIAGNOSIS — I10 PRIMARY HYPERTENSION: ICD-10-CM

## 2023-10-24 DIAGNOSIS — Z79.4 UNCONTROLLED TYPE 2 DIABETES MELLITUS WITH HYPERGLYCEMIA, WITH LONG-TERM CURRENT USE OF INSULIN (HCC): ICD-10-CM

## 2023-10-24 DIAGNOSIS — E11.65 UNCONTROLLED TYPE 2 DIABETES MELLITUS WITH HYPERGLYCEMIA, WITH LONG-TERM CURRENT USE OF INSULIN (HCC): ICD-10-CM

## 2023-10-24 DIAGNOSIS — E03.9 HYPOTHYROIDISM, UNSPECIFIED TYPE: Primary | ICD-10-CM

## 2023-10-24 PROCEDURE — 3044F HG A1C LEVEL LT 7.0%: CPT | Performed by: INTERNAL MEDICINE

## 2023-10-24 PROCEDURE — G8484 FLU IMMUNIZE NO ADMIN: HCPCS | Performed by: INTERNAL MEDICINE

## 2023-10-24 PROCEDURE — 99308 SBSQ NF CARE LOW MDM 20: CPT | Performed by: INTERNAL MEDICINE

## 2023-10-26 ENCOUNTER — APPOINTMENT (OUTPATIENT)
Dept: CT IMAGING | Age: 63
End: 2023-10-26
Payer: MEDICARE

## 2023-10-26 ENCOUNTER — HOSPITAL ENCOUNTER (EMERGENCY)
Age: 63
Discharge: NURSING, REHAB OR LONG-TERM CARE | End: 2023-10-27
Payer: MEDICARE

## 2023-10-26 DIAGNOSIS — K58.9 IRRITABLE BOWEL SYNDROME, UNSPECIFIED TYPE: Primary | ICD-10-CM

## 2023-10-26 DIAGNOSIS — R19.7 DIARRHEA, UNSPECIFIED TYPE: ICD-10-CM

## 2023-10-26 LAB
ALBUMIN SERPL-MCNC: 3.4 G/DL (ref 3.5–4.6)
ALP SERPL-CCNC: 65 U/L (ref 40–130)
ALT SERPL-CCNC: 16 U/L (ref 0–33)
ANION GAP SERPL CALCULATED.3IONS-SCNC: 12 MEQ/L (ref 9–15)
AST SERPL-CCNC: 15 U/L (ref 0–35)
BASOPHILS # BLD: 0 K/UL (ref 0–0.2)
BASOPHILS NFR BLD: 0.3 %
BILIRUB SERPL-MCNC: <0.2 MG/DL (ref 0.2–0.7)
BUN SERPL-MCNC: 22 MG/DL (ref 8–23)
CALCIUM SERPL-MCNC: 9 MG/DL (ref 8.5–9.9)
CHLORIDE SERPL-SCNC: 106 MEQ/L (ref 95–107)
CO2 SERPL-SCNC: 25 MEQ/L (ref 20–31)
CREAT SERPL-MCNC: 0.76 MG/DL (ref 0.5–0.9)
EOSINOPHIL # BLD: 0.1 K/UL (ref 0–0.7)
EOSINOPHIL NFR BLD: 1.5 %
ERYTHROCYTE [DISTWIDTH] IN BLOOD BY AUTOMATED COUNT: 13.4 % (ref 11.5–14.5)
GLOBULIN SER CALC-MCNC: 2.7 G/DL (ref 2.3–3.5)
GLUCOSE SERPL-MCNC: 99 MG/DL (ref 70–99)
HCT VFR BLD AUTO: 35.4 % (ref 37–47)
HGB BLD-MCNC: 11.4 G/DL (ref 12–16)
LACTATE BLDV-SCNC: 1.2 MMOL/L (ref 0.5–2.2)
LIPASE SERPL-CCNC: 9 U/L (ref 12–95)
LYMPHOCYTES # BLD: 2.9 K/UL (ref 1–4.8)
LYMPHOCYTES NFR BLD: 33.4 %
MAGNESIUM SERPL-MCNC: 1.5 MG/DL (ref 1.7–2.4)
MCH RBC QN AUTO: 28.5 PG (ref 27–31.3)
MCHC RBC AUTO-ENTMCNC: 32.2 % (ref 33–37)
MCV RBC AUTO: 88.5 FL (ref 79.4–94.8)
MONOCYTES # BLD: 0.9 K/UL (ref 0.2–0.8)
MONOCYTES NFR BLD: 10.1 %
NEUTROPHILS # BLD: 4.8 K/UL (ref 1.4–6.5)
NEUTS SEG NFR BLD: 54.5 %
PLATELET # BLD AUTO: 303 K/UL (ref 130–400)
POC CREATININE WHOLE BLOOD: 0.8
POTASSIUM SERPL-SCNC: 4.1 MEQ/L (ref 3.4–4.9)
PROT SERPL-MCNC: 6.1 G/DL (ref 6.3–8)
RBC # BLD AUTO: 4 M/UL (ref 4.2–5.4)
SODIUM SERPL-SCNC: 143 MEQ/L (ref 135–144)
WBC # BLD AUTO: 8.7 K/UL (ref 4.8–10.8)

## 2023-10-26 PROCEDURE — 6360000004 HC RX CONTRAST MEDICATION

## 2023-10-26 PROCEDURE — 93005 ELECTROCARDIOGRAM TRACING: CPT

## 2023-10-26 PROCEDURE — 99285 EMERGENCY DEPT VISIT HI MDM: CPT

## 2023-10-26 PROCEDURE — A4216 STERILE WATER/SALINE, 10 ML: HCPCS

## 2023-10-26 PROCEDURE — 83605 ASSAY OF LACTIC ACID: CPT

## 2023-10-26 PROCEDURE — 2580000003 HC RX 258

## 2023-10-26 PROCEDURE — 85025 COMPLETE CBC W/AUTO DIFF WBC: CPT

## 2023-10-26 PROCEDURE — 2500000003 HC RX 250 WO HCPCS

## 2023-10-26 PROCEDURE — 83735 ASSAY OF MAGNESIUM: CPT

## 2023-10-26 PROCEDURE — 36415 COLL VENOUS BLD VENIPUNCTURE: CPT

## 2023-10-26 PROCEDURE — 6360000002 HC RX W HCPCS

## 2023-10-26 PROCEDURE — 6370000000 HC RX 637 (ALT 250 FOR IP)

## 2023-10-26 PROCEDURE — 81003 URINALYSIS AUTO W/O SCOPE: CPT

## 2023-10-26 PROCEDURE — 74177 CT ABD & PELVIS W/CONTRAST: CPT

## 2023-10-26 PROCEDURE — 96375 TX/PRO/DX INJ NEW DRUG ADDON: CPT

## 2023-10-26 PROCEDURE — 80053 COMPREHEN METABOLIC PANEL: CPT

## 2023-10-26 PROCEDURE — 83690 ASSAY OF LIPASE: CPT

## 2023-10-26 RX ORDER — 0.9 % SODIUM CHLORIDE 0.9 %
1000 INTRAVENOUS SOLUTION INTRAVENOUS ONCE
Status: COMPLETED | OUTPATIENT
Start: 2023-10-26 | End: 2023-10-26

## 2023-10-26 RX ORDER — DICYCLOMINE HYDROCHLORIDE 10 MG/1
10 CAPSULE ORAL ONCE
Status: COMPLETED | OUTPATIENT
Start: 2023-10-26 | End: 2023-10-26

## 2023-10-26 RX ORDER — METOCLOPRAMIDE HYDROCHLORIDE 5 MG/ML
10 INJECTION INTRAMUSCULAR; INTRAVENOUS ONCE
Status: COMPLETED | OUTPATIENT
Start: 2023-10-26 | End: 2023-10-26

## 2023-10-26 RX ADMIN — DICYCLOMINE HYDROCHLORIDE 10 MG: 10 CAPSULE ORAL at 22:30

## 2023-10-26 RX ADMIN — FAMOTIDINE 20 MG: 10 INJECTION, SOLUTION INTRAVENOUS at 22:29

## 2023-10-26 RX ADMIN — METOCLOPRAMIDE HYDROCHLORIDE 10 MG: 5 INJECTION INTRAMUSCULAR; INTRAVENOUS at 22:33

## 2023-10-26 RX ADMIN — IOPAMIDOL 50 ML: 612 INJECTION, SOLUTION INTRAVENOUS at 23:49

## 2023-10-26 RX ADMIN — SODIUM CHLORIDE 1000 ML: 9 INJECTION, SOLUTION INTRAVENOUS at 22:28

## 2023-10-26 ASSESSMENT — ENCOUNTER SYMPTOMS
PHOTOPHOBIA: 0
ABDOMINAL PAIN: 1
BLOOD IN STOOL: 0
NAUSEA: 0
SHORTNESS OF BREATH: 0
DIARRHEA: 1
COUGH: 0
CONSTIPATION: 0
VOMITING: 0

## 2023-10-26 ASSESSMENT — LIFESTYLE VARIABLES
HOW OFTEN DO YOU HAVE A DRINK CONTAINING ALCOHOL: NEVER
HOW MANY STANDARD DRINKS CONTAINING ALCOHOL DO YOU HAVE ON A TYPICAL DAY: PATIENT DOES NOT DRINK

## 2023-10-26 ASSESSMENT — PAIN DESCRIPTION - LOCATION: LOCATION: ABDOMEN

## 2023-10-26 ASSESSMENT — PAIN SCALES - GENERAL: PAINLEVEL_OUTOF10: 9

## 2023-10-26 ASSESSMENT — PAIN - FUNCTIONAL ASSESSMENT: PAIN_FUNCTIONAL_ASSESSMENT: 0-10

## 2023-10-27 VITALS
TEMPERATURE: 97.9 F | WEIGHT: 234.6 LBS | SYSTOLIC BLOOD PRESSURE: 159 MMHG | DIASTOLIC BLOOD PRESSURE: 69 MMHG | RESPIRATION RATE: 18 BRPM | HEIGHT: 68 IN | OXYGEN SATURATION: 100 % | BODY MASS INDEX: 35.55 KG/M2 | HEART RATE: 77 BPM

## 2023-10-27 LAB
BILIRUB UR QL STRIP: NEGATIVE
CLARITY UR: CLEAR
COLOR UR: YELLOW
EKG ATRIAL RATE: 67 BPM
EKG P AXIS: 50 DEGREES
EKG P-R INTERVAL: 202 MS
EKG Q-T INTERVAL: 408 MS
EKG QRS DURATION: 100 MS
EKG QTC CALCULATION (BAZETT): 431 MS
EKG R AXIS: -19 DEGREES
EKG T AXIS: 50 DEGREES
EKG VENTRICULAR RATE: 67 BPM
GLUCOSE UR STRIP-MCNC: NEGATIVE MG/DL
HGB UR QL STRIP: NEGATIVE
KETONES UR STRIP-MCNC: NEGATIVE MG/DL
LEUKOCYTE ESTERASE UR QL STRIP: NEGATIVE
NITRITE UR QL STRIP: NEGATIVE
PERFORMED ON: NORMAL
PH UR STRIP: 5 [PH] (ref 5–9)
POC CREATININE: 0.8 MG/DL (ref 0.6–1.2)
POC SAMPLE TYPE: NORMAL
PROT UR STRIP-MCNC: NEGATIVE MG/DL
SP GR UR STRIP: 1.02 (ref 1–1.03)
URINE REFLEX TO CULTURE: NORMAL
UROBILINOGEN UR STRIP-ACNC: 0.2 E.U./DL

## 2023-10-27 PROCEDURE — 96365 THER/PROPH/DIAG IV INF INIT: CPT

## 2023-10-27 PROCEDURE — 6370000000 HC RX 637 (ALT 250 FOR IP)

## 2023-10-27 PROCEDURE — 6360000002 HC RX W HCPCS

## 2023-10-27 RX ORDER — DICYCLOMINE HYDROCHLORIDE 10 MG/1
10 CAPSULE ORAL 4 TIMES DAILY PRN
Qty: 120 CAPSULE | Refills: 0 | Status: SHIPPED | OUTPATIENT
Start: 2023-10-27

## 2023-10-27 RX ORDER — AZITHROMYCIN 500 MG/1
1000 TABLET, FILM COATED ORAL ONCE
Status: COMPLETED | OUTPATIENT
Start: 2023-10-27 | End: 2023-10-27

## 2023-10-27 RX ORDER — DICYCLOMINE HYDROCHLORIDE 10 MG/1
10 CAPSULE ORAL 4 TIMES DAILY
Qty: 120 CAPSULE | Refills: 0 | Status: SHIPPED | OUTPATIENT
Start: 2023-10-27 | End: 2023-10-27 | Stop reason: SDUPTHER

## 2023-10-27 RX ORDER — MAGNESIUM SULFATE IN WATER 40 MG/ML
2000 INJECTION, SOLUTION INTRAVENOUS ONCE
Status: COMPLETED | OUTPATIENT
Start: 2023-10-27 | End: 2023-10-27

## 2023-10-27 RX ORDER — METOCLOPRAMIDE 10 MG/1
10 TABLET ORAL 4 TIMES DAILY PRN
Qty: 120 TABLET | Refills: 0 | Status: SHIPPED | OUTPATIENT
Start: 2023-10-27

## 2023-10-27 RX ADMIN — MAGNESIUM SULFATE HEPTAHYDRATE 2000 MG: 40 INJECTION, SOLUTION INTRAVENOUS at 01:54

## 2023-10-27 RX ADMIN — AZITHROMYCIN DIHYDRATE 1000 MG: 500 TABLET, FILM COATED ORAL at 01:54

## 2023-10-27 ASSESSMENT — PAIN - FUNCTIONAL ASSESSMENT: PAIN_FUNCTIONAL_ASSESSMENT: NONE - DENIES PAIN

## 2023-10-27 NOTE — ED TRIAGE NOTES
Patient is from [facility]. Patient states that she called EMS herself after waking up with severe abdominal pain. Patient states that she has a hx of IBS, and notified the staff at the facility that she was experiencing pain. Patient states that the staff did not respond to her so she called EMS.     DONNA GREGORY

## 2023-10-27 NOTE — ED PROVIDER NOTES
[facility] ED  EMERGENCY DEPARTMENT ENCOUNTER      Pt Name: Matty Morales  MRN: [id number]  [address] 1960  Date of evaluation: 10/26/2023  Provider: EMERY Delgadillo  10:06 PM EDT      CHIEF COMPLAINT       Chief Complaint   Patient presents with    Abdominal Pain     Patient has hx of IBS         HISTORY OF PRESENT ILLNESS   (Location/Symptom, Timing/Onset, Context/Setting, Quality, Duration, Modifying Factors, Severity)  Note limiting factors. Matty Morales is a 60 y.o. female who presents to the emergency department PMHx CKD, diabetes, diabetic neuropathy, osteoarthrosis, hypertension, dementia, hypothyroidism, IBS with chronic diarrhea/stool incontinence. Patient presents to emergency department for evaluation of 3-hour history of lower abdominal burning pain sensation. Patient states that she had eaten dinner at the nursing home, where she resides, she had fallen asleep and subsequently awoke with this pain. Pain seems to be more localized to the right lower quadrant but it is diffuse across the lower abdomen. Patient denies any prior abdominal surgeries. Does endorse history of similar pain like this with history of IBS. No medicines administered prior to arrival.  With her IBS patient states she has chronic incontinence of diarrhea. This is unchanged today. However patient states she has had constant diarrhea for the past 2 weeks, where it is typically more intermittent for her. No constipation or obstipation. Patient denies nausea vomiting or upper abdominal pain. No chest pains. No fever or chills. Patient also states she is typically pretty incontinent of urine. This is also unchanged today. Denies any sensation of urinary frequency urgency, flank pain, dysuria, hematuria, vaginal bleeding or discharge. Non melanotic, non-bloody stools. Nonmucoid stools. No recent travel. HPI    Nursing Notes were reviewed.     REVIEW OF SYSTEMS    (2-9 systems for level

## 2023-10-27 NOTE — PROGRESS NOTES
Activity: Not on file   Stress: Not on file   Social Connections: Not on file   Intimate Partner Violence: Not on file   Housing Stability: Not on file       Allergies: Codeine  NF MEDICATIONS REVIEWED AND ALLERGIES REVIEWED IN NF CHART  Current Outpatient Medications on File Prior to Visit   Medication Sig Dispense Refill    Semaglutide,0.25 or 0.5MG/DOS, (OZEMPIC, 0.25 OR 0.5 MG/DOSE,) 2 MG/1.5ML SOPN Inject 0.25 mg into the skin once a week 1 Adjustable Dose Pre-filled Pen Syringe 3    pregabalin (LYRICA) 50 MG capsule Take 1 capsule by mouth 2 times daily for 90 days. Max Daily Amount: 100 mg 60 capsule 2    amitriptyline (ELAVIL) 10 MG tablet Take 0.5 tablets by mouth nightly Indications: Depression      atorvastatin (LIPITOR) 10 MG tablet Take 1 tablet by mouth nightly Indications: High Amount of Fats in the Blood      glimepiride (AMARYL) 4 MG tablet Take 1 tablet by mouth in the morning and 1 tablet in the evening. Indications: Type 2 Diabetes. insulin glargine (LANTUS) 100 UNIT/ML injection vial Inject 10 Units into the skin every morning Indications: Type 2 Diabetes      insulin lispro (HUMALOG) 100 UNIT/ML SOLN injection vial Inject 2 Units into the skin 3 times daily (with meals) Indications: Type 2 Diabetes      levothyroxine (SYNTHROID) 75 MCG tablet Take 1 tablet by mouth Daily Indications: Underactive Thyroid      lisinopril (PRINIVIL;ZESTRIL) 20 MG tablet Take 1 tablet by mouth daily Indications: High Blood Pressure Disorder      metFORMIN (GLUCOPHAGE) 500 MG tablet Take 1 tablet by mouth 2 times daily (with meals) Indications: Type 2 Diabetes      metoprolol succinate (TOPROL XL) 25 MG extended release tablet Take 1 tablet by mouth daily Indications: High Blood Pressure Disorder      vitamin D (CHOLECALCIFEROL) 50 MCG (2000 UT) TABS tablet Take 1 tablet by mouth daily Indications: Nutritional Support       No current facility-administered medications on file prior to visit.        ROS:

## 2023-10-28 NOTE — PROGRESS NOTES
nightly Indications: High Amount of Fats in the Blood      glimepiride (AMARYL) 4 MG tablet Take 1 tablet by mouth in the morning and 1 tablet in the evening. Indications: Type 2 Diabetes. insulin glargine (LANTUS) 100 UNIT/ML injection vial Inject 10 Units into the skin every morning Indications: Type 2 Diabetes      insulin lispro (HUMALOG) 100 UNIT/ML SOLN injection vial Inject 2 Units into the skin 3 times daily (with meals) Indications: Type 2 Diabetes      levothyroxine (SYNTHROID) 75 MCG tablet Take 1 tablet by mouth Daily Indications: Underactive Thyroid      lisinopril (PRINIVIL;ZESTRIL) 20 MG tablet Take 1 tablet by mouth daily Indications: High Blood Pressure Disorder      metFORMIN (GLUCOPHAGE) 500 MG tablet Take 1 tablet by mouth 2 times daily (with meals) Indications: Type 2 Diabetes      metoprolol succinate (TOPROL XL) 25 MG extended release tablet Take 1 tablet by mouth daily Indications: High Blood Pressure Disorder      vitamin D (CHOLECALCIFEROL) 50 MCG (2000 UT) TABS tablet Take 1 tablet by mouth daily Indications: Nutritional Support       No current facility-administered medications on file prior to visit. No family history on file.     Social History     Socioeconomic History    Marital status:      Spouse name: Not on file    Number of children: Not on file    Years of education: Not on file    Highest education level: Not on file   Occupational History    Not on file   Tobacco Use    Smoking status: Never    Smokeless tobacco: Never   Substance and Sexual Activity    Alcohol use: Not on file    Drug use: Not on file    Sexual activity: Not on file   Other Topics Concern    Not on file   Social History Narrative    Not on file     Social Determinants of Health     Financial Resource Strain: Not on file   Food Insecurity: Not on file   Transportation Needs: Not on file   Physical Activity: Not on file   Stress: Not on file   Social Connections: Not on file   Intimate

## 2023-11-04 NOTE — PROGRESS NOTES
SUBJECTIVE:  This 76-year-old woman seen follow-up visit for hypertension diabetes neuropathy patient globally weak at baseline poorly compliant no interventions pain-free at this time. Patient has been at her baseline terms of cognition. Notes no acute psychosis      ROS: Denies chest palpitation  The rest of the 14 point ROS negative    PHYSICAL EXAM: VSS per facility record  Obese pupils are reactive oral mucosa moist no thrush chest with no crackles no wheezing cardiovascular showed a regular abdomen soft nontender EXTR +1 refill skin no new rash    ASSESSMENT & PLAN:   Diagnosis Orders   1. Primary hypertension        2. Uncontrolled type 2 diabetes mellitus with hyperglycemia, with long-term current use of insulin ([address])        3. Neuropathy            Has been doing well and with therapy goal to transition to lower level care for possible assisted living versus independent living. Function stable this time monitoring blood sugars A1c. Past Medical History:   Diagnosis Date    Acute cystitis with hematuria 7/10/2023    Agitation due to dementia [facility]) 7/18/2023    JACOB (acute kidney injury) ([address]) 7/10/2023    Fall 7/10/2023    Hypothyroidism 7/10/2023    Neuropathy 7/18/2023    Poorly controlled type 2 diabetes mellitus with neuropathy ([address]) 7/10/2023    Primary hypertension 7/10/2023    Uncontrolled type 2 diabetes mellitus with hyperglycemia, with long-term current use of insulin ([address]) 7/10/2023    Weakness 7/10/2023         No past surgical history on file. Current Outpatient Medications on File Prior to Visit   Medication Sig Dispense Refill    Semaglutide,0.25 or 0.5MG/DOS, (OZEMPIC, 0.25 OR 0.5 MG/DOSE,) 2 MG/1.5ML SOPN Inject 0.25 mg into the skin once a week 1 Adjustable Dose Pre-filled Pen Syringe 3    pregabalin (LYRICA) 50 MG capsule Take 1 capsule by mouth 2 times daily for 90 days.  Max Daily Amount: 100 mg 60 capsule 2    amitriptyline (ELAVIL) 10 MG tablet Take 0.5 tablets by mouth Statement Selected

## 2023-11-09 NOTE — PROGRESS NOTES
SUBJECTIVE:  This 68-year-old woman seen in follow-up for functional decline globally weak. No orthostasis. Is a diabetic without hypoglycemia. No acute pain crisis patient has been intermittently compliant nurse interventions      ROS: [place] chest pain palpitation  The rest of the 14 point ROS negative    PHYSICAL EXAM: VSS per facility record  Obese alert orient x2 pupils reactive oral mucosa moist chest with no crackles or wheezing cardiovascular showed a regular rate abdomen soft nontender EXTR with trace edema    ASSESSMENT & PLAN:   Diagnosis Orders   1. Hypothyroidism, unspecified type        2. Primary hypertension        3. Uncontrolled type 2 diabetes mellitus with hyperglycemia, with long-term current use of insulin ([address])          Blood sugars reviewed with nursing staff. Pressure reviewed with nursing staff continue to support engagement with compliance with therapy. Past Medical History:   Diagnosis Date    Acute cystitis with hematuria 7/10/2023    Agitation due to dementia [facility]) 7/18/2023    JACOB (acute kidney injury) ([address]) 7/10/2023    Fall 7/10/2023    Hypothyroidism 7/10/2023    Neuropathy 7/18/2023    Poorly controlled type 2 diabetes mellitus with neuropathy ([address]) 7/10/2023    Primary hypertension 7/10/2023    Uncontrolled type 2 diabetes mellitus with hyperglycemia, with long-term current use of insulin ([address]) 7/10/2023    Weakness 7/10/2023         No past surgical history on file. Current Outpatient Medications on File Prior to Visit   Medication Sig Dispense Refill    Semaglutide,0.25 or 0.5MG/DOS, (OZEMPIC, 0.25 OR 0.5 MG/DOSE,) 2 MG/1.5ML SOPN Inject 0.25 mg into the skin once a week 1 Adjustable Dose Pre-filled Pen Syringe 3    pregabalin (LYRICA) 50 MG capsule Take 1 capsule by mouth 2 times daily for 90 days.  Max Daily Amount: 100 mg 60 capsule 2    amitriptyline (ELAVIL) 10 MG tablet Take 0.5 tablets by mouth nightly Indications: Depression      atorvastatin (LIPITOR)

## 2023-11-21 NOTE — PROGRESS NOTES
SUBJECTIVE:  This 78-year-old woman seen for visit for weakness patient reevaluated, states no change in bowel habits no symptoms hypoglycemia recent emesis fevers or chills oral intake has been good. No acute pain crisis. ROS: Denies chest palpitations  The rest of the 14 point ROS negative    PHYSICAL EXAM: VSS per facility record  Pupils reactive oral mucosa moist with no crackles or wheezing cardiovascular showed regular rate abdomen soft nontender EXTR trace edema    ASSESSMENT & PLAN:   Diagnosis Orders   1. Hypothyroidism, unspecified type        2. Primary hypertension        3. Uncontrolled type 2 diabetes mellitus with hyperglycemia, with long-term current use of insulin ([facility])          A1c BMP pending    Support skin surveillance: Functional status. Patient is able            Past Medical History:   Diagnosis Date    Acute cystitis with hematuria 7/10/2023    Agitation due to dementia [facility]) 7/18/2023    JACOB (acute kidney injury) ([address]) 7/10/2023    Fall 7/10/2023    Hypothyroidism 7/10/2023    Neuropathy 7/18/2023    Poorly controlled type 2 diabetes mellitus with neuropathy ([address]) 7/10/2023    Primary hypertension 7/10/2023    Uncontrolled type 2 diabetes mellitus with hyperglycemia, with long-term current use of insulin ([address]) 7/10/2023    Weakness 7/10/2023         No past surgical history on file. Current Outpatient Medications on File Prior to Visit   Medication Sig Dispense Refill    Semaglutide,0.25 or 0.5MG/DOS, (OZEMPIC, 0.25 OR 0.5 MG/DOSE,) 2 MG/1.5ML SOPN Inject 0.25 mg into the skin once a week 1 Adjustable Dose Pre-filled Pen Syringe 3    pregabalin (LYRICA) 50 MG capsule Take 1 capsule by mouth 2 times daily for 90 days.  Max Daily Amount: 100 mg 60 capsule 2    amitriptyline (ELAVIL) 10 MG tablet Take 0.5 tablets by mouth nightly Indications: Depression      atorvastatin (LIPITOR) 10 MG tablet Take 1 tablet by mouth nightly Indications: High Amount of Fats in the Blood

## 2023-11-24 ENCOUNTER — OFFICE VISIT (OUTPATIENT)
Dept: GASTROENTEROLOGY | Age: 63
End: 2023-11-24
Payer: MEDICARE

## 2023-11-24 VITALS — WEIGHT: 235 LBS | BODY MASS INDEX: 35.61 KG/M2 | HEART RATE: 78 BPM | OXYGEN SATURATION: 99 % | HEIGHT: 68 IN

## 2023-11-24 DIAGNOSIS — R19.4 ALTERED BOWEL HABITS: ICD-10-CM

## 2023-11-24 DIAGNOSIS — R11.2 NAUSEA AND VOMITING, UNSPECIFIED VOMITING TYPE: Primary | ICD-10-CM

## 2023-11-24 DIAGNOSIS — R19.7 DIARRHEA, UNSPECIFIED TYPE: ICD-10-CM

## 2023-11-24 PROCEDURE — G8417 CALC BMI ABV UP PARAM F/U: HCPCS | Performed by: SPECIALIST

## 2023-11-24 PROCEDURE — 99204 OFFICE O/P NEW MOD 45 MIN: CPT | Performed by: SPECIALIST

## 2023-11-24 PROCEDURE — G8484 FLU IMMUNIZE NO ADMIN: HCPCS | Performed by: SPECIALIST

## 2023-11-24 PROCEDURE — G8427 DOCREV CUR MEDS BY ELIG CLIN: HCPCS | Performed by: SPECIALIST

## 2023-11-24 PROCEDURE — 1036F TOBACCO NON-USER: CPT | Performed by: SPECIALIST

## 2023-11-24 PROCEDURE — 3017F COLORECTAL CA SCREEN DOC REV: CPT | Performed by: SPECIALIST

## 2023-11-24 RX ORDER — ONDANSETRON 4 MG/1
TABLET, FILM COATED ORAL
COMMUNITY

## 2023-11-24 RX ORDER — ERGOCALCIFEROL 1.25 MG/1
CAPSULE ORAL
COMMUNITY

## 2023-11-24 RX ORDER — SODIUM, POTASSIUM,MAG SULFATES 17.5-3.13G
SOLUTION, RECONSTITUTED, ORAL ORAL
Qty: 354 ML | Refills: 0 | Status: SHIPPED | OUTPATIENT
Start: 2023-11-24

## 2023-11-24 ASSESSMENT — ENCOUNTER SYMPTOMS
ANAL BLEEDING: 0
CONSTIPATION: 1
NAUSEA: 1
ABDOMINAL PAIN: 1
ABDOMINAL DISTENTION: 0
BLOOD IN STOOL: 0
RESPIRATORY NEGATIVE: 1
RECTAL PAIN: 0
EYES NEGATIVE: 1
VOMITING: 1
DIARRHEA: 1

## 2023-11-24 NOTE — PROGRESS NOTES
glands are within normal limits. Symmetric appearance of the kidneys. No stones or distension seen in the renal collecting system. GI/Bowel: The stomach is unremarkable in appearance. No wall thickening. The small bowel is within normal limits. No mucosal abnormality. Some stool seen scattered diffusely throughout the colon. There is mild wall thickening identified of the a [place]ock flexure. Focal colitis cannot be completely excluded. No signs of surrounding stranding. Pelvis: The bladder is mildly distended. The uterus is unremarkable. Peritoneum/Retroperitoneum: No abdominal or retroperitoneal lymphadenopathy. No free fluid or free air. Bones/Soft Tissues: Bony structures reveal no evidence of acute or aggressive osseous abnormality. Multilevel degenerative changes seen within the spine. Mild diastasis of the rectus muscles with no ventral abdominal wall mass or defect. Short segment of wall thickening of the a hepatic flexure suggesting possible focal colitis. No significant surrounding stranding. Clinical correlation is needed. Stool scattered throughout the colon to suggest mild clinical presentation of constipation. No signs of obstruction. Endoscopic investigations:     Assessmentand Plan:  60 y.o. female with history of abdominal pain associate with nausea vomiting and also has altered bowel habits predominantly diarrhea. Patient has history of diabetes with neuropathy. Status post gastric stapling about 30 years ago. Of the abdomen showed a short segment thickening of the hepatic flexure area suggestive of focal colitis. Rule out ulcer disease, possible diabetic gastroparesis, IBD, IBS, neoplasm or microscopic colitis or possibly secondary to metformin or possible malabsorption will schedule EGD and colonoscopy. Will also order stool studies   Diagnosis Orders   1. Nausea and vomiting, unspecified vomiting type  EGD      2.  Altered bowel habits  COLONOSCOPY W/ OR W/O BIOPSY

## 2023-12-03 LAB
REASON FOR REJECTION: NORMAL
REJECTED TEST: NORMAL

## 2023-12-11 DIAGNOSIS — G62.9 NEUROPATHY: ICD-10-CM

## 2023-12-13 RX ORDER — PREGABALIN 50 MG/1
50 CAPSULE ORAL 2 TIMES DAILY
Qty: 60 CAPSULE | Refills: 5 | Status: SHIPPED | OUTPATIENT
Start: 2023-12-13 | End: 2024-06-10

## 2023-12-21 ENCOUNTER — OFFICE VISIT (OUTPATIENT)
Dept: GERIATRIC MEDICINE | Age: 63
End: 2023-12-21

## 2023-12-21 DIAGNOSIS — R73.9 HYPERGLYCEMIA: ICD-10-CM

## 2023-12-21 DIAGNOSIS — E11.40 POORLY CONTROLLED TYPE 2 DIABETES MELLITUS WITH NEUROPATHY (HCC): Primary | ICD-10-CM

## 2023-12-21 DIAGNOSIS — E11.65 POORLY CONTROLLED TYPE 2 DIABETES MELLITUS WITH NEUROPATHY (HCC): Primary | ICD-10-CM

## 2023-12-30 NOTE — PROGRESS NOTES
Disorder      metFORMIN (GLUCOPHAGE) 500 MG tablet Take 1 tablet by mouth 2 times daily (with meals) Indications: Type 2 Diabetes      metoprolol succinate (TOPROL XL) 25 MG extended release tablet Take 1 tablet by mouth daily Indications: High Blood Pressure Disorder      vitamin D (CHOLECALCIFEROL) 50 MCG (2000 UT) TABS tablet Take 1 tablet by mouth daily Indications: Nutritional Support       No current facility-administered medications on file prior to visit. ROS:   Constitutional: There are continued reports of behavioral issues, she has  psych consult made months ago  Respiratory: denies SOB  Cardiovascular: denies CP  GI/: diarrhea better    Extremities: No reports of pain issues    Physical exam:   VSS see in [facility]    Constitutional: Awake and alert sitting up   HEENT:  MMM  Speech clear    Cardiovascular: Regular rate   Respiratory: Even unlabored respirations   Psych: pleasant    ASSESSMENT:     Diagnosis Orders   1. Poorly controlled type 2 diabetes mellitus with neuropathy ([address])        2. Hyperglycemia            PLAN:  Pt/POA agrees with POC   Semaglutide inj re started and lantus reduced to 15 units from previous dose of 20 units   Plan to wean off insulin NOT semaglutide       adhere to the JNC VIII guidelines for HTN management and the NCEP ATP III guidelines for LDL-C management. Return if symptoms worsen or fail to improve. 30\" spent on visit    Pertinent POC, medications, labs, have been reviewed, continue same. Encourage fluids and good nutrition. Stress fall prevention strategies. Electronically signed by: KARINA Sheffield CNP on 12/21/2023    Please note this report is partially produced by using speech recognition hardware. It may contain errors related to the system, including grammar, punctuation and spelling as well as words and phrases that may seem inaccurate.   For any questions or concerns feel free to contact me for clarification

## 2024-01-03 ENCOUNTER — OFFICE VISIT (OUTPATIENT)
Dept: GERIATRIC MEDICINE | Age: 64
End: 2024-01-03

## 2024-01-03 DIAGNOSIS — F03.911 AGITATION DUE TO DEMENTIA (HCC): ICD-10-CM

## 2024-01-03 DIAGNOSIS — G62.9 NEUROPATHY: ICD-10-CM

## 2024-01-03 DIAGNOSIS — Z79.4 UNCONTROLLED TYPE 2 DIABETES MELLITUS WITH HYPERGLYCEMIA, WITH LONG-TERM CURRENT USE OF INSULIN (HCC): ICD-10-CM

## 2024-01-03 DIAGNOSIS — I10 PRIMARY HYPERTENSION: Primary | ICD-10-CM

## 2024-01-03 DIAGNOSIS — E11.65 UNCONTROLLED TYPE 2 DIABETES MELLITUS WITH HYPERGLYCEMIA, WITH LONG-TERM CURRENT USE OF INSULIN (HCC): ICD-10-CM

## 2024-01-04 ENCOUNTER — OFFICE VISIT (OUTPATIENT)
Dept: GERIATRIC MEDICINE | Age: 64
End: 2024-01-04

## 2024-01-04 DIAGNOSIS — Z79.4 UNCONTROLLED TYPE 2 DIABETES MELLITUS WITH HYPERGLYCEMIA, WITH LONG-TERM CURRENT USE OF INSULIN (HCC): Primary | ICD-10-CM

## 2024-01-04 DIAGNOSIS — E11.65 UNCONTROLLED TYPE 2 DIABETES MELLITUS WITH HYPERGLYCEMIA, WITH LONG-TERM CURRENT USE OF INSULIN (HCC): Primary | ICD-10-CM

## 2024-01-06 NOTE — PROGRESS NOTES
MG capsule Take 1 capsule by mouth 2 times daily for 180 days. Max Daily Amount: 100 mg 60 capsule 5    ergocalciferol (ERGOCALCIFEROL) 1.25 MG (95394 UT) capsule       ondansetron (ZOFRAN) 4 MG tablet Take 1 tablet every 6 hours by oral route.      sodium-potassium-mag sulfate (SUPREP) 17.5-3.13-1.6 GM/177ML SOLN solution As directed 354 mL 0    metoclopramide (REGLAN) 10 MG tablet Take 1 tablet by mouth 4 times daily as needed (Abdominal Pain) 120 tablet 0    dicyclomine (BENTYL) 10 MG capsule Take 1 capsule by mouth 4 times daily as needed (Abdominal cramping) 120 capsule 0    Semaglutide,0.25 or 0.5MG/DOS, (OZEMPIC, 0.25 OR 0.5 MG/DOSE,) 2 MG/1.5ML SOPN Inject 0.25 mg into the skin once a week 1 Adjustable Dose Pre-filled Pen Syringe 3    amitriptyline (ELAVIL) 10 MG tablet Take 0.5 tablets by mouth nightly Indications: Depression      atorvastatin (LIPITOR) 10 MG tablet Take 1 tablet by mouth nightly Indications: High Amount of Fats in the Blood      glimepiride (AMARYL) 4 MG tablet Take 1 tablet by mouth in the morning and 1 tablet in the evening. Indications: Type 2 Diabetes.      insulin glargine (LANTUS) 100 UNIT/ML injection vial Inject 10 Units into the skin every morning Indications: Type 2 Diabetes      insulin lispro (HUMALOG) 100 UNIT/ML SOLN injection vial Inject 2 Units into the skin 3 times daily (with meals) Indications: Type 2 Diabetes      levothyroxine (SYNTHROID) 75 MCG tablet Take 1 tablet by mouth Daily Indications: Underactive Thyroid      lisinopril (PRINIVIL;ZESTRIL) 20 MG tablet Take 1 tablet by mouth daily Indications: High Blood Pressure Disorder      metFORMIN (GLUCOPHAGE) 500 MG tablet Take 1 tablet by mouth 2 times daily (with meals) Indications: Type 2 Diabetes      metoprolol succinate (TOPROL XL) 25 MG extended release tablet Take 1 tablet by mouth daily Indications: High Blood Pressure Disorder      vitamin D (CHOLECALCIFEROL) 50 MCG (2000 UT) TABS tablet Take 1 tablet by mouth

## 2024-01-08 ENCOUNTER — OFFICE VISIT (OUTPATIENT)
Dept: GERIATRIC MEDICINE | Age: 64
End: 2024-01-08

## 2024-01-08 ENCOUNTER — HOSPITAL ENCOUNTER (EMERGENCY)
Age: 64
Discharge: HOME | End: 2024-01-08
Attending: EMERGENCY MEDICINE
Payer: MEDICARE

## 2024-01-08 VITALS
HEART RATE: 73 BPM | BODY MASS INDEX: 35.61 KG/M2 | SYSTOLIC BLOOD PRESSURE: 115 MMHG | OXYGEN SATURATION: 100 % | TEMPERATURE: 98.5 F | DIASTOLIC BLOOD PRESSURE: 58 MMHG | WEIGHT: 235 LBS | RESPIRATION RATE: 18 BRPM | HEIGHT: 68 IN

## 2024-01-08 DIAGNOSIS — R19.7 DIARRHEA, UNSPECIFIED TYPE: ICD-10-CM

## 2024-01-08 DIAGNOSIS — I95.9 HYPOTENSION, UNSPECIFIED HYPOTENSION TYPE: Primary | ICD-10-CM

## 2024-01-08 DIAGNOSIS — R55 NEAR SYNCOPE: Primary | ICD-10-CM

## 2024-01-08 DIAGNOSIS — R29.818 AURAS: ICD-10-CM

## 2024-01-08 LAB
ALBUMIN SERPL-MCNC: 3.4 G/DL (ref 3.5–4.6)
ALP SERPL-CCNC: 72 U/L (ref 40–130)
ALT SERPL-CCNC: 21 U/L (ref 0–33)
ANION GAP SERPL CALCULATED.3IONS-SCNC: 12 MEQ/L (ref 9–15)
AST SERPL-CCNC: 14 U/L (ref 0–35)
BASOPHILS # BLD: 0 K/UL (ref 0–0.2)
BASOPHILS NFR BLD: 0.3 %
BILIRUB SERPL-MCNC: 0.3 MG/DL (ref 0.2–0.7)
BUN SERPL-MCNC: 22 MG/DL (ref 8–23)
CALCIUM SERPL-MCNC: 8.4 MG/DL (ref 8.5–9.9)
CHLORIDE SERPL-SCNC: 108 MEQ/L (ref 95–107)
CO2 SERPL-SCNC: 25 MEQ/L (ref 20–31)
CREAT SERPL-MCNC: 0.79 MG/DL (ref 0.5–0.9)
EOSINOPHIL # BLD: 0.1 K/UL (ref 0–0.7)
EOSINOPHIL NFR BLD: 1.2 %
ERYTHROCYTE [DISTWIDTH] IN BLOOD BY AUTOMATED COUNT: 14.2 % (ref 11.5–14.5)
GLOBULIN SER CALC-MCNC: 2.8 G/DL (ref 2.3–3.5)
GLUCOSE SERPL-MCNC: 132 MG/DL (ref 70–99)
HCT VFR BLD AUTO: 34 % (ref 37–47)
HGB BLD-MCNC: 11.1 G/DL (ref 12–16)
LACTATE BLDV-SCNC: 1.6 MMOL/L (ref 0.5–2.2)
LYMPHOCYTES # BLD: 2.4 K/UL (ref 1–4.8)
LYMPHOCYTES NFR BLD: 26.2 %
MAGNESIUM SERPL-MCNC: 1.8 MG/DL (ref 1.7–2.4)
MCH RBC QN AUTO: 29.7 PG (ref 27–31.3)
MCHC RBC AUTO-ENTMCNC: 32.6 % (ref 33–37)
MCV RBC AUTO: 90.9 FL (ref 79.4–94.8)
MONOCYTES # BLD: 0.8 K/UL (ref 0.2–0.8)
MONOCYTES NFR BLD: 8.5 %
NEUTROPHILS # BLD: 5.9 K/UL (ref 1.4–6.5)
NEUTS SEG NFR BLD: 63.5 %
PLATELET # BLD AUTO: 283 K/UL (ref 130–400)
POTASSIUM SERPL-SCNC: 4 MEQ/L (ref 3.4–4.9)
PROT SERPL-MCNC: 6.2 G/DL (ref 6.3–8)
RBC # BLD AUTO: 3.74 M/UL (ref 4.2–5.4)
SODIUM SERPL-SCNC: 145 MEQ/L (ref 135–144)
TROPONIN, HIGH SENSITIVITY: 9 NG/L (ref 0–19)
WBC # BLD AUTO: 9.3 K/UL (ref 4.8–10.8)

## 2024-01-08 PROCEDURE — 36415 COLL VENOUS BLD VENIPUNCTURE: CPT

## 2024-01-08 PROCEDURE — 83735 ASSAY OF MAGNESIUM: CPT

## 2024-01-08 PROCEDURE — 2580000003 HC RX 258: Performed by: EMERGENCY MEDICINE

## 2024-01-08 PROCEDURE — 83605 ASSAY OF LACTIC ACID: CPT

## 2024-01-08 PROCEDURE — 99284 EMERGENCY DEPT VISIT MOD MDM: CPT

## 2024-01-08 PROCEDURE — 85025 COMPLETE CBC W/AUTO DIFF WBC: CPT

## 2024-01-08 PROCEDURE — 93005 ELECTROCARDIOGRAM TRACING: CPT | Performed by: EMERGENCY MEDICINE

## 2024-01-08 PROCEDURE — 84484 ASSAY OF TROPONIN QUANT: CPT

## 2024-01-08 PROCEDURE — 80053 COMPREHEN METABOLIC PANEL: CPT

## 2024-01-08 RX ORDER — 0.9 % SODIUM CHLORIDE 0.9 %
500 INTRAVENOUS SOLUTION INTRAVENOUS ONCE
Status: COMPLETED | OUTPATIENT
Start: 2024-01-08 | End: 2024-01-08

## 2024-01-08 RX ADMIN — SODIUM CHLORIDE 500 ML: 9 INJECTION, SOLUTION INTRAVENOUS at 18:10

## 2024-01-08 ASSESSMENT — ENCOUNTER SYMPTOMS
NAUSEA: 0
SHORTNESS OF BREATH: 0
ABDOMINAL PAIN: 0
EYE PAIN: 0
CHEST TIGHTNESS: 0
VOMITING: 0
SORE THROAT: 0

## 2024-01-08 ASSESSMENT — PAIN - FUNCTIONAL ASSESSMENT
PAIN_FUNCTIONAL_ASSESSMENT: NONE - DENIES PAIN
PAIN_FUNCTIONAL_ASSESSMENT: NONE - DENIES PAIN

## 2024-01-08 NOTE — ED PROVIDER NOTES
[facility] ED  EMERGENCY DEPARTMENT ENCOUNTER      Pt Name: Gemini Molina  MRN: [id number]  Birthdate 1960  Date of evaluation: 1/8/2024  Provider: Kate Gallagher DO    CHIEF COMPLAINT       Chief Complaint   Patient presents with    Dizziness         HISTORY OF PRESENT ILLNESS   (Location/Symptom, Timing/Onset, Context/Setting, Quality, Duration, Modifying Factors, Severity)  Note limiting factors.   Gemini Molina is a 63 y.o. female who presents to the emergency department .  Patient states that she was getting out of the shower and felt really weak and had to sit down.  She was told blood pressure was 70/30.  Patient states that she has just eaten 2 cookies because she was afraid her blood sugar was low.  Patient does have history of type 2 diabetes hypertension hypothyroidism dementia.  Patient states that she is on Ozempic and sometimes just does not have a very good appetite.  She is on Ozempic for her diabetes.    HPI    Nursing Notes were reviewed.    REVIEW OF SYSTEMS    (2-9 systems for level 4, 10 or more for level 5)     Review of Systems   Constitutional:  Positive for fatigue. Negative for activity change and appetite change.   HENT:  Negative for congestion and sore throat.    Eyes:  Negative for pain and visual disturbance.   Respiratory:  Negative for chest tightness and shortness of breath.    Cardiovascular:  Negative for chest pain.   Gastrointestinal:  Negative for abdominal pain, nausea and vomiting.   Endocrine: Negative for polydipsia.   Genitourinary:  Negative for flank pain and urgency.   Musculoskeletal:  Negative for gait problem and neck stiffness.   Skin:  Negative for rash.   Neurological:  Positive for weakness and light-headedness. Negative for headaches.   Psychiatric/Behavioral:  Negative for confusion and sleep disturbance.        Except as noted above the remainder of the review of systems was reviewed and negative.       PAST MEDICAL HISTORY     Past

## 2024-01-08 NOTE — ED TRIAGE NOTES
Pt presents to ER via EMS from [facility] for hypotension and dizziness. Pt stated, \"I took a shower and got out and knew I wasn't gonna make it so I sat on the toilet\". Residential area told EMS that the patients pressure was 70/60's, patient was given about 250mL of fluids and is now 136/64. Pt is A&Ox4, warm and dry but is very fatigued at this time with vital stable.

## 2024-01-09 LAB
EKG ATRIAL RATE: 73 BPM
EKG P AXIS: 4 DEGREES
EKG P-R INTERVAL: 172 MS
EKG Q-T INTERVAL: 406 MS
EKG QRS DURATION: 98 MS
EKG QTC CALCULATION (BAZETT): 447 MS
EKG R AXIS: -33 DEGREES
EKG T AXIS: 35 DEGREES
EKG VENTRICULAR RATE: 73 BPM

## 2024-01-09 PROCEDURE — 93010 ELECTROCARDIOGRAM REPORT: CPT | Performed by: INTERNAL MEDICINE

## 2024-01-11 ENCOUNTER — OFFICE VISIT (OUTPATIENT)
Dept: GERIATRIC MEDICINE | Age: 64
End: 2024-01-11
Payer: MEDICARE

## 2024-01-11 DIAGNOSIS — I95.9 HYPOTENSION, UNSPECIFIED HYPOTENSION TYPE: Primary | ICD-10-CM

## 2024-01-11 DIAGNOSIS — R29.818 AURA: ICD-10-CM

## 2024-01-11 PROCEDURE — G8484 FLU IMMUNIZE NO ADMIN: HCPCS | Performed by: NURSE PRACTITIONER

## 2024-01-11 PROCEDURE — 99309 SBSQ NF CARE MODERATE MDM 30: CPT | Performed by: NURSE PRACTITIONER

## 2024-01-18 ENCOUNTER — OFFICE VISIT (OUTPATIENT)
Dept: GERIATRIC MEDICINE | Age: 64
End: 2024-01-18
Payer: MEDICARE

## 2024-01-18 DIAGNOSIS — Z79.4 UNCONTROLLED TYPE 2 DIABETES MELLITUS WITH HYPERGLYCEMIA, WITH LONG-TERM CURRENT USE OF INSULIN (HCC): Primary | ICD-10-CM

## 2024-01-18 DIAGNOSIS — F03.911 AGITATION DUE TO DEMENTIA (HCC): ICD-10-CM

## 2024-01-18 DIAGNOSIS — E03.9 HYPOTHYROIDISM, UNSPECIFIED TYPE: ICD-10-CM

## 2024-01-18 DIAGNOSIS — K58.2 IRRITABLE BOWEL SYNDROME WITH BOTH CONSTIPATION AND DIARRHEA: ICD-10-CM

## 2024-01-18 DIAGNOSIS — E11.65 UNCONTROLLED TYPE 2 DIABETES MELLITUS WITH HYPERGLYCEMIA, WITH LONG-TERM CURRENT USE OF INSULIN (HCC): Primary | ICD-10-CM

## 2024-01-18 DIAGNOSIS — G62.9 NEUROPATHY: ICD-10-CM

## 2024-01-18 PROCEDURE — G8484 FLU IMMUNIZE NO ADMIN: HCPCS | Performed by: NURSE PRACTITIONER

## 2024-01-18 PROCEDURE — 99316 NF DSCHRG MGMT 30 MIN+: CPT | Performed by: NURSE PRACTITIONER

## 2024-01-18 PROCEDURE — 3046F HEMOGLOBIN A1C LEVEL >9.0%: CPT | Performed by: NURSE PRACTITIONER

## 2024-01-22 ENCOUNTER — OFFICE VISIT (OUTPATIENT)
Dept: GERIATRIC MEDICINE | Age: 64
End: 2024-01-22
Payer: MEDICARE

## 2024-01-22 DIAGNOSIS — F03.911 AGITATION DUE TO DEMENTIA (HCC): ICD-10-CM

## 2024-01-22 DIAGNOSIS — Z79.4 UNCONTROLLED TYPE 2 DIABETES MELLITUS WITH HYPERGLYCEMIA, WITH LONG-TERM CURRENT USE OF INSULIN (HCC): Primary | ICD-10-CM

## 2024-01-22 DIAGNOSIS — R53.1 WEAKNESS: ICD-10-CM

## 2024-01-22 DIAGNOSIS — E03.9 HYPOTHYROIDISM, UNSPECIFIED TYPE: ICD-10-CM

## 2024-01-22 DIAGNOSIS — E11.65 UNCONTROLLED TYPE 2 DIABETES MELLITUS WITH HYPERGLYCEMIA, WITH LONG-TERM CURRENT USE OF INSULIN (HCC): Primary | ICD-10-CM

## 2024-01-22 DIAGNOSIS — I10 PRIMARY HYPERTENSION: ICD-10-CM

## 2024-01-22 DIAGNOSIS — G62.9 NEUROPATHY: ICD-10-CM

## 2024-01-22 PROCEDURE — 3046F HEMOGLOBIN A1C LEVEL >9.0%: CPT | Performed by: NURSE PRACTITIONER

## 2024-01-22 PROCEDURE — G8484 FLU IMMUNIZE NO ADMIN: HCPCS | Performed by: NURSE PRACTITIONER

## 2024-01-22 PROCEDURE — 99310 SBSQ NF CARE HIGH MDM 45: CPT | Performed by: NURSE PRACTITIONER

## 2024-01-24 NOTE — PROGRESS NOTES
reconciling medications, discussing plan answering questions with patient. Pertinent POC, labs, have been reviewed  Discharge patient from facility when arrangements are made. Home Health Agency with PT/OT Equipment needed shower chair and walker . Same medications     Walker is needed to allow pt to participate safely in mobility related activities of daily living while in the home. The pt has a mobility limitation that significantly impairs his/her ability to participate in one or more mobility related activities of daily living in the home which prevents pt from accomplishing the MRADL or places pt in a reasonably determined heightened risk of morbidity secondary to the attempts to perform MRADL or prevents pt from completing the MRADL within a reasonable time frame in the home.  Pt is able to safely use the walker and the functional mobility deficit can be sufficiently resolved by the use to the walker.     Shower chair, will significantly improve pts ability to participate in mobility related activities of daily living of toileting, dressing, bathing. Not able to static stand for long enough periods to perform ADLs.     Electronically signed by: KARINA Keller CNP on 2024    Please note this report is partially produced by using speech recognition hardware.  It may contain errors related to the system, including grammar, punctuation and spelling as well as words and phrases that may seem inaccurate.  For any questions or concerns feel free to contact me for clarification    Referral to Home Health:   Documentation of Face to Face Encounter   Certification statement    Name: Gemini Molina           Facility: [facility]  Date: 2024                   : 1960    I completed a face-to-face encounter on 2024 with the above named patient.  In this encounter a medical condition was addressed, which is the primary reason for home health care.    Based on my findings, the following

## 2024-01-30 NOTE — PROGRESS NOTES
[place] [address] [address] [place], [place] [address]    1/11/2024    Gemini Molina  is a 63 y.o. in the NF being seen for    Chief Complaint   Patient presents with    Other     F/u after ED visit for low BP and aura       She states her ED w/u was negative  She feels better now        Per ED note:  patient was given about 250mL of fluids and is now 136/64. Pt is A&Ox4, warm and dry but is very fatigued at this time with vital stable    EKG wnl   CMP CBC are at baseline for pt     Past Medical History:   Diagnosis Date    Acute cystitis with hematuria 7/10/2023    Agitation due to dementia ([facility]) 7/18/2023    JACOB (acute kidney injury) ([facility]) 7/10/2023    Fall 7/10/2023    Hypothyroidism 7/10/2023    Neuropathy 7/18/2023    Poorly controlled type 2 diabetes mellitus with neuropathy ([facility]) 7/10/2023    Primary hypertension 7/10/2023    Uncontrolled type 2 diabetes mellitus with hyperglycemia, with long-term current use of insulin ([facility]) 7/10/2023    Weakness 7/10/2023     No past surgical history on file.  No family history on file.  Social History     Socioeconomic History    Marital status:      Spouse name: Not on file    Number of children: Not on file    Years of education: Not on file    Highest education level: Not on file   Occupational History    Not on file   Tobacco Use    Smoking status: Never    Smokeless tobacco: Never   Substance and Sexual Activity    Alcohol use: Not on file    Drug use: Not on file    Sexual activity: Not on file   Other Topics Concern    Not on file   Social History Narrative    Not on file     Social Determinants of Health     Financial Resource Strain: Not on file   Food Insecurity: Not on file   Transportation Needs: Not on file   Physical Activity: Not on file   Stress: Not on file   Social Connections: Not on file   Intimate Partner Violence: Not on file   Housing Stability: Not on file       Allergies: Codeine   MEDICATIONS REVIEWED AND ALLERGIES

## 2024-01-31 DIAGNOSIS — G62.9 NEUROPATHY: ICD-10-CM

## 2024-01-31 RX ORDER — LOPERAMIDE HYDROCHLORIDE 2 MG/1
2 TABLET ORAL SEE ADMIN INSTRUCTIONS
COMMUNITY

## 2024-01-31 RX ORDER — PREGABALIN 50 MG/1
50 CAPSULE ORAL 2 TIMES DAILY
Qty: 60 CAPSULE | Refills: 5 | Status: SHIPPED | OUTPATIENT
Start: 2024-01-31 | End: 2024-07-29

## 2024-01-31 RX ORDER — BUSPIRONE HYDROCHLORIDE 5 MG/1
5 TABLET ORAL 2 TIMES DAILY
COMMUNITY

## 2024-01-31 RX ORDER — ACETAMINOPHEN 325 MG/1
650 TABLET ORAL EVERY 4 HOURS PRN
COMMUNITY

## 2024-01-31 ASSESSMENT — ENCOUNTER SYMPTOMS
ALLERGIC/IMMUNOLOGIC NEGATIVE: 1
EYES NEGATIVE: 1
RESPIRATORY NEGATIVE: 1
CONSTIPATION: 1
DIARRHEA: 1

## 2024-01-31 NOTE — PROGRESS NOTES
[place] [address]  [place], [place] [address]    Subjective:  admission  H & P for assisted living    1/22/2024    Patient ID: Gemini Molina is a 63 y.o. female being seen today for:   Chief Complaint   Patient presents with    H&P     For move to assisted living        Been in LTC ~ 1year   Now moving to assisted living  Has undiagnosed psych issues possibly personality disorder pt refuses to go to psych consult  Has explosive violent behaviors at times with impulsivity       Fair  short term memory  Good  long term memory  Needs no assist/cues with decisions  Needs no assist with ADLs but needs encouragement  Needs no assist with IADLs but needs encouragement   Good hearing,   Good vision,   + Falls risk  Can still drive but states she gave her car to her son, nurse notes car was repossessed     Hobbies: none  Bathing self   Yazdanism none    Pt continues to live in assisted living apartment.   Spoke to nursing and ancillary staff regarding patient status, present state of health. Need for nursing care and assistance. Reviewed chart, labs, medications in NF chart and allergies. Aging in place at this time.    being discharged from LTC, had original admission for rehab after their hospitalization 6/21 - 7/3 for JACOB, freq falls, diabetic neuropathy, UTI, weakness.   Completed rehab and stayed in LTC. Then she recovered well enough to transfer to assisted living, has been on waiting list.   While here attempts to wean off insulin are under way since pt gained quite a bit of weight from insulin. Trying GLP-1 for wt loss and DM2.      Had syncopal episode while showering once. Also has episodes of uncontrollable behaviors of anger, screams and yells, throws things at nursing on several occassions .Dx with dementia with behaviors. Her dementia is very mild, suspect personality disorder but pt denies psychiatric history.      Past Medical History:   Diagnosis Date    Acute cystitis with

## 2024-02-01 NOTE — PROGRESS NOTES
SUBJECTIVE:  63-year-old woman seen for follow-up visit for hypertension diabetes neuropathy patient has dementia with intermittent confusion patient has not recent nausea vomiting no symptomatic hypoglycemia no acute pain crisis      ROS: Limited by cognition  The rest of the 14 point ROS negative    PHYSICAL EXAM: VSS per facility record  Pupils are small they are reactive oral mucosa dry chest with no crackles no wheezing cardiovascular showed a regular rate abdomen soft nontender extremity trace edema    ASSESSMENT & PLAN:   Diagnosis Orders   1. Primary hypertension        2. Uncontrolled type 2 diabetes mellitus with hyperglycemia, with long-term current use of insulin ([facility])        3. Neuropathy        4. Agitation due to dementia ([facility])          Continue monitor support skin surveillance: See functional status: See functional decline no symptoms of hypoglycemia.  A1c BMP pending.  Blood sugars BNP elevated.  Monitor signs of endorgan disease.            Past Medical History:   Diagnosis Date    Acute cystitis with hematuria 7/10/2023    Agitation due to dementia ([facility]) 7/18/2023    JACOB (acute kidney injury) ([facility]) 7/10/2023    Fall 7/10/2023    Hypothyroidism 7/10/2023    Neuropathy 7/18/2023    Poorly controlled type 2 diabetes mellitus with neuropathy ([facility]) 7/10/2023    Primary hypertension 7/10/2023    Uncontrolled type 2 diabetes mellitus with hyperglycemia, with long-term current use of insulin ([facility]) 7/10/2023    Weakness 7/10/2023         No past surgical history on file.      Current Outpatient Medications on File Prior to Visit   Medication Sig Dispense Refill    ondansetron (ZOFRAN) 4 MG tablet Take 1 tablet every 6 hours by oral route.      metoclopramide (REGLAN) 10 MG tablet Take 1 tablet by mouth 4 times daily as needed (Abdominal Pain) 120 tablet 0    dicyclomine (BENTYL) 10 MG capsule Take 1 capsule by mouth 4 times daily as needed (Abdominal cramping) (Patient taking differently: Take 1 capsule

## 2024-03-19 ENCOUNTER — APPOINTMENT (OUTPATIENT)
Dept: CT IMAGING | Age: 64
DRG: 185 | End: 2024-03-19
Payer: MEDICARE

## 2024-03-19 ENCOUNTER — HOSPITAL ENCOUNTER (INPATIENT)
Age: 64
LOS: 1 days | Discharge: NURSING, REHAB OR LONG-TERM CARE | DRG: 185 | End: 2024-03-21
Attending: STUDENT IN AN ORGANIZED HEALTH CARE EDUCATION/TRAINING PROGRAM | Admitting: SURGERY
Payer: MEDICARE

## 2024-03-19 ENCOUNTER — APPOINTMENT (OUTPATIENT)
Dept: GENERAL RADIOLOGY | Age: 64
DRG: 185 | End: 2024-03-19
Payer: MEDICARE

## 2024-03-19 DIAGNOSIS — S22.41XA CLOSED FRACTURE OF MULTIPLE RIBS OF RIGHT SIDE, INITIAL ENCOUNTER: ICD-10-CM
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
## 2024-03-19 DIAGNOSIS — S22.41XA MULTIPLE FRACTURES OF RIBS, RIGHT SIDE, INITIAL ENCOUNTER FOR CLOSED FRACTURE: ICD-10-CM

## 2024-03-19 DIAGNOSIS — G89.11 ACUTE PAIN DUE TO TRAUMA: ICD-10-CM

## 2024-03-19 LAB
ALBUMIN SERPL-MCNC: 3.6 G/DL (ref 3.5–4.6)
ALP SERPL-CCNC: 79 U/L (ref 40–130)
ALT SERPL-CCNC: 27 U/L (ref 0–33)
ANION GAP SERPL CALCULATED.3IONS-SCNC: 11 MEQ/L (ref 9–15)
AST SERPL-CCNC: 26 U/L (ref 0–35)
BASOPHILS # BLD: 0 K/UL (ref 0–0.2)
BASOPHILS NFR BLD: 0.3 %
BILIRUB SERPL-MCNC: 0.5 MG/DL (ref 0.2–0.7)
BUN SERPL-MCNC: 22 MG/DL (ref 8–23)
CALCIUM SERPL-MCNC: 8.7 MG/DL (ref 8.5–9.9)
CHLORIDE SERPL-SCNC: 106 MEQ/L (ref 95–107)
CK SERPL-CCNC: 38 U/L (ref 0–170)
CO2 SERPL-SCNC: 25 MEQ/L (ref 20–31)
CREAT SERPL-MCNC: 0.84 MG/DL (ref 0.5–0.9)
EKG ATRIAL RATE: 63 BPM
EKG P AXIS: 45 DEGREES
EKG P-R INTERVAL: 198 MS
EKG Q-T INTERVAL: 438 MS
EKG QRS DURATION: 100 MS
EKG QTC CALCULATION (BAZETT): 448 MS
EKG R AXIS: -27 DEGREES
EKG T AXIS: 29 DEGREES
EKG VENTRICULAR RATE: 63 BPM
EOSINOPHIL # BLD: 0.1 K/UL (ref 0–0.7)
EOSINOPHIL NFR BLD: 1.2 %
ERYTHROCYTE [DISTWIDTH] IN BLOOD BY AUTOMATED COUNT: 13.1 % (ref 11.5–14.5)
GLOBULIN SER CALC-MCNC: 2.7 G/DL (ref 2.3–3.5)
GLUCOSE BLD-MCNC: 123 MG/DL (ref 70–99)
GLUCOSE BLD-MCNC: 148 MG/DL (ref 70–99)
GLUCOSE BLD-MCNC: 152 MG/DL (ref 70–99)
GLUCOSE SERPL-MCNC: 219 MG/DL (ref 70–99)
HCT VFR BLD AUTO: 42.8 % (ref 37–47)
HGB BLD-MCNC: 13.5 G/DL (ref 12–16)
LACTATE BLDV-SCNC: 2.1 MMOL/L (ref 0.5–2.2)
LYMPHOCYTES # BLD: 2.2 K/UL (ref 1–4.8)
LYMPHOCYTES NFR BLD: 21 %
MAGNESIUM SERPL-MCNC: 1.9 MG/DL (ref 1.7–2.4)
MCH RBC QN AUTO: 29.3 PG (ref 27–31.3)
MCHC RBC AUTO-ENTMCNC: 31.5 % (ref 33–37)
MCV RBC AUTO: 93 FL (ref 79.4–94.8)
MONOCYTES # BLD: 0.6 K/UL (ref 0.2–0.8)
MONOCYTES NFR BLD: 5.8 %
NEUTROPHILS # BLD: 7.6 K/UL (ref 1.4–6.5)
NEUTS SEG NFR BLD: 71.2 %
PERFORMED ON: ABNORMAL
PLATELET # BLD AUTO: 288 K/UL (ref 130–400)
POTASSIUM SERPL-SCNC: 4.8 MEQ/L (ref 3.4–4.9)
PROT SERPL-MCNC: 6.3 G/DL (ref 6.3–8)
RBC # BLD AUTO: 4.6 M/UL (ref 4.2–5.4)
SODIUM SERPL-SCNC: 142 MEQ/L (ref 135–144)
T4 FREE SERPL-MCNC: 1.27 NG/DL (ref 0.84–1.68)
TSH REFLEX: 6.7 UIU/ML (ref 0.44–3.86)
WBC # BLD AUTO: 10.7 K/UL (ref 4.8–10.8)

## 2024-03-19 PROCEDURE — 6370000000 HC RX 637 (ALT 250 FOR IP): Performed by: STUDENT IN AN ORGANIZED HEALTH CARE EDUCATION/TRAINING PROGRAM

## 2024-03-19 PROCEDURE — G0378 HOSPITAL OBSERVATION PER HR: HCPCS

## 2024-03-19 PROCEDURE — 6360000004 HC RX CONTRAST MEDICATION: Performed by: STUDENT IN AN ORGANIZED HEALTH CARE EDUCATION/TRAINING PROGRAM

## 2024-03-19 PROCEDURE — 70450 CT HEAD/BRAIN W/O DYE: CPT

## 2024-03-19 PROCEDURE — 93010 ELECTROCARDIOGRAM REPORT: CPT | Performed by: INTERNAL MEDICINE

## 2024-03-19 PROCEDURE — 83605 ASSAY OF LACTIC ACID: CPT

## 2024-03-19 PROCEDURE — 99285 EMERGENCY DEPT VISIT HI MDM: CPT

## 2024-03-19 PROCEDURE — 2580000003 HC RX 258: Performed by: STUDENT IN AN ORGANIZED HEALTH CARE EDUCATION/TRAINING PROGRAM

## 2024-03-19 PROCEDURE — 6370000000 HC RX 637 (ALT 250 FOR IP): Performed by: SURGERY

## 2024-03-19 PROCEDURE — 6360000002 HC RX W HCPCS: Performed by: SURGERY

## 2024-03-19 PROCEDURE — 84439 ASSAY OF FREE THYROXINE: CPT

## 2024-03-19 PROCEDURE — 99282 EMERGENCY DEPT VISIT SF MDM: CPT | Performed by: SURGERY

## 2024-03-19 PROCEDURE — 90471 IMMUNIZATION ADMIN: CPT | Performed by: STUDENT IN AN ORGANIZED HEALTH CARE EDUCATION/TRAINING PROGRAM

## 2024-03-19 PROCEDURE — 96374 THER/PROPH/DIAG INJ IV PUSH: CPT

## 2024-03-19 PROCEDURE — 96361 HYDRATE IV INFUSION ADD-ON: CPT

## 2024-03-19 PROCEDURE — 84443 ASSAY THYROID STIM HORMONE: CPT

## 2024-03-19 PROCEDURE — 90715 TDAP VACCINE 7 YRS/> IM: CPT | Performed by: STUDENT IN AN ORGANIZED HEALTH CARE EDUCATION/TRAINING PROGRAM

## 2024-03-19 PROCEDURE — 73560 X-RAY EXAM OF KNEE 1 OR 2: CPT

## 2024-03-19 PROCEDURE — 93005 ELECTROCARDIOGRAM TRACING: CPT | Performed by: STUDENT IN AN ORGANIZED HEALTH CARE EDUCATION/TRAINING PROGRAM

## 2024-03-19 PROCEDURE — 80053 COMPREHEN METABOLIC PANEL: CPT

## 2024-03-19 PROCEDURE — 74177 CT ABD & PELVIS W/CONTRAST: CPT

## 2024-03-19 PROCEDURE — 96375 TX/PRO/DX INJ NEW DRUG ADDON: CPT

## 2024-03-19 PROCEDURE — 71260 CT THORAX DX C+: CPT

## 2024-03-19 PROCEDURE — 6360000002 HC RX W HCPCS: Performed by: STUDENT IN AN ORGANIZED HEALTH CARE EDUCATION/TRAINING PROGRAM

## 2024-03-19 PROCEDURE — 2580000003 HC RX 258: Performed by: SURGERY

## 2024-03-19 PROCEDURE — 96372 THER/PROPH/DIAG INJ SC/IM: CPT

## 2024-03-19 PROCEDURE — 82550 ASSAY OF CK (CPK): CPT

## 2024-03-19 PROCEDURE — 96376 TX/PRO/DX INJ SAME DRUG ADON: CPT

## 2024-03-19 PROCEDURE — 83735 ASSAY OF MAGNESIUM: CPT

## 2024-03-19 PROCEDURE — 36415 COLL VENOUS BLD VENIPUNCTURE: CPT

## 2024-03-19 PROCEDURE — 73130 X-RAY EXAM OF HAND: CPT

## 2024-03-19 PROCEDURE — 85025 COMPLETE CBC W/AUTO DIFF WBC: CPT

## 2024-03-19 RX ORDER — INSULIN LISPRO 100 [IU]/ML
0-4 INJECTION, SOLUTION INTRAVENOUS; SUBCUTANEOUS NIGHTLY
Status: DISCONTINUED | OUTPATIENT
Start: 2024-03-19 | End: 2024-03-21 | Stop reason: HOSPADM

## 2024-03-19 RX ORDER — INSULIN LISPRO 100 [IU]/ML
0-8 INJECTION, SOLUTION INTRAVENOUS; SUBCUTANEOUS
Status: DISCONTINUED | OUTPATIENT
Start: 2024-03-19 | End: 2024-03-21 | Stop reason: HOSPADM

## 2024-03-19 RX ORDER — POTASSIUM CHLORIDE 7.45 MG/ML
10 INJECTION INTRAVENOUS PRN
Status: DISCONTINUED | OUTPATIENT
Start: 2024-03-19 | End: 2024-03-21 | Stop reason: HOSPADM

## 2024-03-19 RX ORDER — ATORVASTATIN CALCIUM 10 MG/1
10 TABLET, FILM COATED ORAL NIGHTLY
Status: DISCONTINUED | OUTPATIENT
Start: 2024-03-19 | End: 2024-03-21 | Stop reason: HOSPADM

## 2024-03-19 RX ORDER — OXYCODONE HYDROCHLORIDE 5 MG/1
2.5 TABLET ORAL EVERY 4 HOURS PRN
Status: DISCONTINUED | OUTPATIENT
Start: 2024-03-19 | End: 2024-03-19

## 2024-03-19 RX ORDER — OXYCODONE HYDROCHLORIDE 5 MG/1
5 TABLET ORAL EVERY 4 HOURS PRN
Status: DISCONTINUED | OUTPATIENT
Start: 2024-03-19 | End: 2024-03-19

## 2024-03-19 RX ORDER — INSULIN GLARGINE 100 [IU]/ML
15 INJECTION, SOLUTION SUBCUTANEOUS EVERY MORNING
Status: DISCONTINUED | OUTPATIENT
Start: 2024-03-19 | End: 2024-03-21 | Stop reason: HOSPADM

## 2024-03-19 RX ORDER — KETOROLAC TROMETHAMINE 30 MG/ML
30 INJECTION, SOLUTION INTRAMUSCULAR; INTRAVENOUS EVERY 6 HOURS
Status: DISCONTINUED | OUTPATIENT
Start: 2024-03-19 | End: 2024-03-21 | Stop reason: HOSPADM

## 2024-03-19 RX ORDER — LEVOTHYROXINE SODIUM 0.07 MG/1
75 TABLET ORAL DAILY
Status: DISCONTINUED | OUTPATIENT
Start: 2024-03-19 | End: 2024-03-21 | Stop reason: HOSPADM

## 2024-03-19 RX ORDER — MAGNESIUM SULFATE IN WATER 40 MG/ML
2000 INJECTION, SOLUTION INTRAVENOUS PRN
Status: DISCONTINUED | OUTPATIENT
Start: 2024-03-19 | End: 2024-03-21 | Stop reason: HOSPADM

## 2024-03-19 RX ORDER — DICYCLOMINE HYDROCHLORIDE 10 MG/1
10 CAPSULE ORAL 4 TIMES DAILY
Status: DISCONTINUED | OUTPATIENT
Start: 2024-03-19 | End: 2024-03-21 | Stop reason: HOSPADM

## 2024-03-19 RX ORDER — MORPHINE SULFATE 4 MG/ML
4 INJECTION, SOLUTION INTRAMUSCULAR; INTRAVENOUS ONCE
Status: COMPLETED | OUTPATIENT
Start: 2024-03-19 | End: 2024-03-19

## 2024-03-19 RX ORDER — LISINOPRIL 20 MG/1
20 TABLET ORAL DAILY
Status: DISCONTINUED | OUTPATIENT
Start: 2024-03-19 | End: 2024-03-21 | Stop reason: HOSPADM

## 2024-03-19 RX ORDER — SENNA AND DOCUSATE SODIUM 50; 8.6 MG/1; MG/1
1 TABLET, FILM COATED ORAL 2 TIMES DAILY
Status: DISCONTINUED | OUTPATIENT
Start: 2024-03-19 | End: 2024-03-21 | Stop reason: HOSPADM

## 2024-03-19 RX ORDER — ACETAMINOPHEN 500 MG
1000 TABLET ORAL ONCE
Status: COMPLETED | OUTPATIENT
Start: 2024-03-19 | End: 2024-03-19

## 2024-03-19 RX ORDER — ACETAMINOPHEN 80 MG
TABLET,CHEWABLE ORAL ONCE
Status: DISCONTINUED | OUTPATIENT
Start: 2024-03-19 | End: 2024-03-21 | Stop reason: HOSPADM

## 2024-03-19 RX ORDER — SODIUM CHLORIDE 0.9 % (FLUSH) 0.9 %
5-40 SYRINGE (ML) INJECTION EVERY 12 HOURS SCHEDULED
Status: DISCONTINUED | OUTPATIENT
Start: 2024-03-19 | End: 2024-03-21 | Stop reason: HOSPADM

## 2024-03-19 RX ORDER — PREGABALIN 25 MG/1
50 CAPSULE ORAL 2 TIMES DAILY
Status: DISCONTINUED | OUTPATIENT
Start: 2024-03-19 | End: 2024-03-21 | Stop reason: HOSPADM

## 2024-03-19 RX ORDER — ONDANSETRON 2 MG/ML
4 INJECTION INTRAMUSCULAR; INTRAVENOUS ONCE
Status: COMPLETED | OUTPATIENT
Start: 2024-03-19 | End: 2024-03-19

## 2024-03-19 RX ORDER — METHOCARBAMOL 750 MG/1
750 TABLET, FILM COATED ORAL 4 TIMES DAILY
Status: DISCONTINUED | OUTPATIENT
Start: 2024-03-19 | End: 2024-03-21 | Stop reason: HOSPADM

## 2024-03-19 RX ORDER — 0.9 % SODIUM CHLORIDE 0.9 %
1000 INTRAVENOUS SOLUTION INTRAVENOUS ONCE
Status: COMPLETED | OUTPATIENT
Start: 2024-03-19 | End: 2024-03-19

## 2024-03-19 RX ORDER — LIDOCAINE 4 G/G
1 PATCH TOPICAL DAILY
Status: DISCONTINUED | OUTPATIENT
Start: 2024-03-19 | End: 2024-03-21 | Stop reason: HOSPADM

## 2024-03-19 RX ORDER — HYDROMORPHONE HYDROCHLORIDE 1 MG/ML
0.5 INJECTION, SOLUTION INTRAMUSCULAR; INTRAVENOUS; SUBCUTANEOUS
Status: DISCONTINUED | OUTPATIENT
Start: 2024-03-19 | End: 2024-03-21

## 2024-03-19 RX ORDER — BUSPIRONE HYDROCHLORIDE 5 MG/1
5 TABLET ORAL 2 TIMES DAILY
Status: DISCONTINUED | OUTPATIENT
Start: 2024-03-19 | End: 2024-03-21 | Stop reason: HOSPADM

## 2024-03-19 RX ORDER — ONDANSETRON 2 MG/ML
4 INJECTION INTRAMUSCULAR; INTRAVENOUS EVERY 6 HOURS PRN
Status: DISCONTINUED | OUTPATIENT
Start: 2024-03-19 | End: 2024-03-21 | Stop reason: HOSPADM

## 2024-03-19 RX ORDER — POTASSIUM CHLORIDE 29.8 MG/ML
20 INJECTION INTRAVENOUS PRN
Status: DISCONTINUED | OUTPATIENT
Start: 2024-03-19 | End: 2024-03-19

## 2024-03-19 RX ORDER — ACETAMINOPHEN 325 MG/1
650 TABLET ORAL EVERY 6 HOURS
Status: DISCONTINUED | OUTPATIENT
Start: 2024-03-19 | End: 2024-03-20

## 2024-03-19 RX ORDER — ENOXAPARIN SODIUM 100 MG/ML
30 INJECTION SUBCUTANEOUS 2 TIMES DAILY
Status: DISCONTINUED | OUTPATIENT
Start: 2024-03-19 | End: 2024-03-21 | Stop reason: HOSPADM

## 2024-03-19 RX ORDER — METOCLOPRAMIDE 5 MG/1
10 TABLET ORAL 4 TIMES DAILY PRN
Status: DISCONTINUED | OUTPATIENT
Start: 2024-03-19 | End: 2024-03-21 | Stop reason: HOSPADM

## 2024-03-19 RX ORDER — SODIUM CHLORIDE 9 MG/ML
INJECTION, SOLUTION INTRAVENOUS PRN
Status: DISCONTINUED | OUTPATIENT
Start: 2024-03-19 | End: 2024-03-21 | Stop reason: HOSPADM

## 2024-03-19 RX ORDER — ONDANSETRON 4 MG/1
4 TABLET, ORALLY DISINTEGRATING ORAL EVERY 8 HOURS PRN
Status: DISCONTINUED | OUTPATIENT
Start: 2024-03-19 | End: 2024-03-21 | Stop reason: HOSPADM

## 2024-03-19 RX ORDER — METOPROLOL SUCCINATE 25 MG/1
25 TABLET, EXTENDED RELEASE ORAL DAILY
Status: DISCONTINUED | OUTPATIENT
Start: 2024-03-19 | End: 2024-03-21 | Stop reason: HOSPADM

## 2024-03-19 RX ORDER — SODIUM CHLORIDE 0.9 % (FLUSH) 0.9 %
5-40 SYRINGE (ML) INJECTION PRN
Status: DISCONTINUED | OUTPATIENT
Start: 2024-03-19 | End: 2024-03-21 | Stop reason: HOSPADM

## 2024-03-19 RX ORDER — AMITRIPTYLINE HYDROCHLORIDE 10 MG/1
5 TABLET, FILM COATED ORAL NIGHTLY
Status: DISCONTINUED | OUTPATIENT
Start: 2024-03-19 | End: 2024-03-21 | Stop reason: HOSPADM

## 2024-03-19 RX ADMIN — ENOXAPARIN SODIUM 30 MG: 100 INJECTION SUBCUTANEOUS at 14:24

## 2024-03-19 RX ADMIN — SODIUM CHLORIDE, PRESERVATIVE FREE 10 ML: 5 INJECTION INTRAVENOUS at 21:57

## 2024-03-19 RX ADMIN — ACETAMINOPHEN 325MG 650 MG: 325 TABLET ORAL at 21:56

## 2024-03-19 RX ADMIN — PREGABALIN 50 MG: 25 CAPSULE ORAL at 21:56

## 2024-03-19 RX ADMIN — METHOCARBAMOL 750 MG: 750 TABLET ORAL at 15:50

## 2024-03-19 RX ADMIN — HYDROMORPHONE HYDROCHLORIDE 0.5 MG: 1 INJECTION, SOLUTION INTRAMUSCULAR; INTRAVENOUS; SUBCUTANEOUS at 19:59

## 2024-03-19 RX ADMIN — ACETAMINOPHEN 1000 MG: 500 TABLET ORAL at 10:45

## 2024-03-19 RX ADMIN — SODIUM CHLORIDE, PRESERVATIVE FREE 10 ML: 5 INJECTION INTRAVENOUS at 15:51

## 2024-03-19 RX ADMIN — SODIUM CHLORIDE 1000 ML: 9 INJECTION, SOLUTION INTRAVENOUS at 08:54

## 2024-03-19 RX ADMIN — ATORVASTATIN CALCIUM 10 MG: 10 TABLET, FILM COATED ORAL at 21:56

## 2024-03-19 RX ADMIN — METHOCARBAMOL 750 MG: 750 TABLET ORAL at 14:19

## 2024-03-19 RX ADMIN — ONDANSETRON 4 MG: 2 INJECTION INTRAMUSCULAR; INTRAVENOUS at 10:46

## 2024-03-19 RX ADMIN — BUSPIRONE HYDROCHLORIDE 5 MG: 5 TABLET ORAL at 21:56

## 2024-03-19 RX ADMIN — DOCUSATE SODIUM AND SENNOSIDES 1 TABLET: 8.6; 5 TABLET, FILM COATED ORAL at 21:56

## 2024-03-19 RX ADMIN — DICYCLOMINE HYDROCHLORIDE 10 MG: 10 CAPSULE ORAL at 21:56

## 2024-03-19 RX ADMIN — MORPHINE SULFATE 4 MG: 4 INJECTION, SOLUTION INTRAMUSCULAR; INTRAVENOUS at 10:46

## 2024-03-19 RX ADMIN — ENOXAPARIN SODIUM 30 MG: 100 INJECTION SUBCUTANEOUS at 21:56

## 2024-03-19 RX ADMIN — HYDROMORPHONE HYDROCHLORIDE 0.5 MG: 1 INJECTION, SOLUTION INTRAMUSCULAR; INTRAVENOUS; SUBCUTANEOUS at 14:20

## 2024-03-19 RX ADMIN — ACETAMINOPHEN 325MG 650 MG: 325 TABLET ORAL at 15:50

## 2024-03-19 RX ADMIN — KETOROLAC TROMETHAMINE 30 MG: 30 INJECTION, SOLUTION INTRAMUSCULAR at 15:50

## 2024-03-19 RX ADMIN — PREGABALIN 50 MG: 25 CAPSULE ORAL at 15:59

## 2024-03-19 RX ADMIN — DICYCLOMINE HYDROCHLORIDE 10 MG: 10 CAPSULE ORAL at 14:20

## 2024-03-19 RX ADMIN — IOPAMIDOL 75 ML: 755 INJECTION, SOLUTION INTRAVENOUS at 09:34

## 2024-03-19 RX ADMIN — KETOROLAC TROMETHAMINE 30 MG: 30 INJECTION, SOLUTION INTRAMUSCULAR at 21:56

## 2024-03-19 RX ADMIN — AMITRIPTYLINE HYDROCHLORIDE 5 MG: 10 TABLET, FILM COATED ORAL at 21:56

## 2024-03-19 RX ADMIN — TETANUS TOXOID, REDUCED DIPHTHERIA TOXOID AND ACELLULAR PERTUSSIS VACCINE, ADSORBED 0.5 ML: 5; 2.5; 8; 8; 2.5 SUSPENSION INTRAMUSCULAR at 08:55

## 2024-03-19 ASSESSMENT — PAIN DESCRIPTION - ORIENTATION: ORIENTATION: MID

## 2024-03-19 ASSESSMENT — PAIN - FUNCTIONAL ASSESSMENT
PAIN_FUNCTIONAL_ASSESSMENT: 0-10
PAIN_FUNCTIONAL_ASSESSMENT: ACTIVITIES ARE NOT PREVENTED
PAIN_FUNCTIONAL_ASSESSMENT: 0-10

## 2024-03-19 ASSESSMENT — PAIN DESCRIPTION - DESCRIPTORS
DESCRIPTORS: ACHING;THROBBING
DESCRIPTORS: ACHING

## 2024-03-19 ASSESSMENT — PAIN DESCRIPTION - ONSET: ONSET: ON-GOING

## 2024-03-19 ASSESSMENT — PAIN SCALES - GENERAL
PAINLEVEL_OUTOF10: 2
PAINLEVEL_OUTOF10: 0
PAINLEVEL_OUTOF10: 10
PAINLEVEL_OUTOF10: 4
PAINLEVEL_OUTOF10: 9
PAINLEVEL_OUTOF10: 3
PAINLEVEL_OUTOF10: 7
PAINLEVEL_OUTOF10: 9
PAINLEVEL_OUTOF10: 5

## 2024-03-19 ASSESSMENT — PAIN DESCRIPTION - FREQUENCY: FREQUENCY: CONTINUOUS

## 2024-03-19 ASSESSMENT — PAIN DESCRIPTION - LOCATION
LOCATION: CHEST
LOCATION: GENERALIZED

## 2024-03-19 ASSESSMENT — LIFESTYLE VARIABLES
HOW MANY STANDARD DRINKS CONTAINING ALCOHOL DO YOU HAVE ON A TYPICAL DAY: PATIENT DOES NOT DRINK
HOW OFTEN DO YOU HAVE A DRINK CONTAINING ALCOHOL: NEVER

## 2024-03-19 ASSESSMENT — PAIN DESCRIPTION - PAIN TYPE: TYPE: ACUTE PAIN

## 2024-03-19 NOTE — H&P
mouth daily Indications: Nutritional Support    Provider, MD Porsche       ALLERGIES:  Codeine    SOCIAL HISTORY:   Social History     Socioeconomic History    Marital status:    Tobacco Use    Smoking status: Never    Smokeless tobacco: Never       FAMILY HISTORY:  No family history on file.  Denies bleeding or clotting problems      REVIEW OF SYSTEMS:   Constitutional: Negative for weight loss  HENT: Negative for congestion, facial swelling and bloody nose  Eyes: Negative for vision changes  Respiratory: Negative for shortness of breath, difficulty breathing  Cardiovascular: Negative for chest wall pain.   Gastrointestinal: Negative for abdominal distention, abdominal pain and vomiting.   Genitourinary: Negative for hematuria  Musculoskeletal: Negative for gait difficulties   Skin: Negative for bruising, abrasions  Neurological: Negative for dizziness, weakness and light-headedness.   Hematological: Negative for easy bruising/bleeding  Psychiatric/Behavioral: Negative for behavioral problems.       Except as noted above the remainder of the review of systems was reviewed and negative.     BASIC LABS:   CBC with Differential:    Lab Results   Component Value Date/Time    WBC 10.7 03/19/2024 08:56 AM    RBC 4.60 03/19/2024 08:56 AM    HGB 13.5 03/19/2024 08:56 AM    HCT 42.8 03/19/2024 08:56 AM     03/19/2024 08:56 AM    MCV 93.0 03/19/2024 08:56 AM    MCH 29.3 03/19/2024 08:56 AM    MCHC 31.5 03/19/2024 08:56 AM    RDW 13.1 03/19/2024 08:56 AM    LYMPHOPCT 21.0 03/19/2024 08:56 AM    MONOPCT 5.8 03/19/2024 08:56 AM    EOSPCT 1.3 06/26/2023 12:00 AM    BASOPCT 0.3 03/19/2024 08:56 AM    MONOSABS 0.6 03/19/2024 08:56 AM    LYMPHSABS 2.2 03/19/2024 08:56 AM    EOSABS 0.1 03/19/2024 08:56 AM    BASOSABS 0.0 03/19/2024 08:56 AM     CMP:   Lab Results   Component Value Date     03/19/2024    K 4.8 03/19/2024     03/19/2024    CO2 25 03/19/2024    BUN 22 03/19/2024    CREATININE 0.84  03/19/2024    GLUCOSE 219 (H) 03/19/2024    CALCIUM 8.7 03/19/2024    PROT 6.3 03/19/2024    LABALBU 3.6 03/19/2024    BILITOT 0.5 03/19/2024    ALKPHOS 79 03/19/2024    AST 26 03/19/2024    ALT 27 03/19/2024    LABGLOM >60.0 03/19/2024    GLOB 2.7 03/19/2024     Magnesium:   Lab Results   Component Value Date/Time    MG 1.9 03/19/2024 08:56 AM     EtOH: No results found for: \"ETOH\"    RADIOLOGY: (Personally reviewed)  XR hand right: Negative    XR knee right: Negative    CT Head: Negative    CT Chest: 1. Minimally displaced anterior right 5th and 6th rib fractures.  2. A 0.7 cm pleural-based nodular density is identified involving the  posterior right lower lobe. This can be seen on CT scans of the abdomen and  pelvis dating back to 07/28/2023.    CT Abdomen/Pelvis: Negative    ASSESSMENT:  Gemini Molina is a 63 y.o. female presenting to the ED after a mechanical fall from standing.      Trauma workup found right anterior mildly displaced rib fractures 5-6      PLAN:  Admit to trauma, RNF  Multimodal pain control  Appropriate home meds restarted  PT/OT consult for baseline difficulty with ambulation  Plan for discharge back to assisted living in 1-2 days        Rohan Suggs MD  Trauma/Critical Care/General Surgery  [See treatment team sticky note for contact information]

## 2024-03-19 NOTE — ED PROVIDER NOTES
[facility] 2W ORTHO TELE  eMERGENCY dEPARTMENT eNCOUnter      Pt Name: Gemini Molina  MRN: [id number]  Birthdate 1960  Date of evaluation: 3/19/2024  Provider: Aayush Felipe MD  Note Started: 3/19/24 8:05 AM EDT    HISTORY OF PRESENT ILLNESS      Chief Complaint   Patient presents with    Fall     Hit chest on walker       The history is provided by the Patient and EMS.  Gemini Molina is a 63 y.o. female with a PMH clinically significant for Frequent falls, Chronic gait instability regularly using walker, Neuropathy, HTN, DM II, CKD, Obesity, Hypothyroidism, OA, and Dementia presenting to the ED via EMS c/o a fall occurring shortly prior to arrival while the patient was walking with her walker at [facility] nursing facility with pain to her right knee, hip, right hand and right-sided chest.  States she fell into her walker causing the pain in her chest.  Denies hitting her head or LOC.  Denies anticoagulation.  Says that she has been having problems with her blood glucose recently and thinks her blood glucose might of been low.  Says it was 115 when they checked it.  States that she has otherwise been feeling well.  She is eating regularly, but might be dehydrated.  Just felt weak for following.  States ambulating is a normal difficulty for her.  Always walks with a walker due to her neuropathy.  Denying any neck pain, back pain, abdominal pain or other injuries to the extremities after the fall.  Also denies any associated: F/C/D, HA, Lightheadedness, Dizziness, Focal Weakness, Cough, Hemoptysis, SOB, Abd pain, Nausea, Vomiting, Dysuria, or Difficulty urinating.  Precipitating Factors: None. Denies preceding CP, Palpitations, SOB, or HA.  States they have otherwise been feeling well.  Taking all medications as indicated: yes.    Per Chart Review: PMH as noted above obtained via outpatient chart review.   Prior fall presenting to the ED appreciated.      REVIEW OF SYSTEMS       Review of  intracranial abnormality.   2. Left mastoiditis.   3. Chronic sinusitis.         CT CHEST W CONTRAST   Final Result   1. Minimally displaced anterior right 5th and 6th rib fractures.   2. A 0.7 cm pleural-based nodular density is identified involving the   posterior right lower lobe. This can be seen on CT scans of the abdomen and   pelvis dating back to 07/28/2023.      RECOMMENDATIONS:   If the patient is high risk for malignancy, consider an additional   noncontrast chest CT at 18-24 months.  If the patient is low risk for   malignancy, a noncontrast chest CT at 18-24 months is optional.  These   guidelines do not apply to immunocompromised patients and patients with   cancer.  Follow up in patients with significant comorbidities as clinically   warranted.  For lung cancer screening, adhere to Lung-RADS guidelines.   Reference: Radiology.  2017; 284 (1): 228-43.         CT ABDOMEN PELVIS W IV CONTRAST Additional Contrast? None   Final Result   1. Minimally displaced fractures involving the anterior right 5th and 6th   ribs.   2. No acute intra-abdominal or intrapelvic process is identified.   3. A moderate amount of retained stool is identified throughout the   descending in sigmoid colon, as well as the rectum.   4. A small hiatal hernia is again appreciated when compared to the previous   study performed 10/26/2023.   5. Again status post cholecystectomy.             ED Course as of 03/19/24 2148   Tue Mar 19, 2024   0947 EKG 12 Lead  EKG showing normal sinus rhythm, rate of 63 bpm.  Normal axis.  Normal intervals.  No gross acute ST-T wave abnormalities. [NA]   1040 Given findings, consulted Trauma Surgery, Dr. Suggs, who was made understanding of the patient's condition in the ED and amenable to serving as consulting service. Will evaluate in the ED. [NA]      ED Course User Index  [NA] Aayush Felipe MD       63 y.o. female with a PMH clinically significant for Frequent falls, Chronic gait

## 2024-03-19 NOTE — ED TRIAGE NOTES
Pt arrived with EMS from Assisted living   Pt fall into walker ending on ground  Pt has redness to right chest   Pt has compliant of pain in chest area from hitting walker  Pt is alert and oriented times 4  Pt denies hitting head   Pt denies any LOC

## 2024-03-19 NOTE — ED NOTES
Spoke with AJ nurse from [facility] and updated on pt being admitted at this time

## 2024-03-19 NOTE — CARE COORDINATION
with any other family members/significant others, and if so, who? Yes (HER SON WAS NOTIFIED BY NURSING STAFF)  Plans to Return to Present Housing: Yes  Other Identified Issues/Barriers to RETURNING to current housing: APPOPRIATE PAIN MEDICATION, REST & ICE. USE INCENTIVE SPIROMETRY PT PREVENT PNA, & SPLINT AREA  Potential Assistance needed at discharge:              Potential DME:  TBD  Patient expects to discharge to: Assisted living  Plan for transportation at discharge:  S    Financial    Payor: [facility] MEDICARE / Plan: [employer] DUAL COMPLETE / Product Type: *No Product type* /     Does insurance require precert for SNF: Yes    Potential assistance Purchasing Medications: No  Meds-to-Beds request:        [facility] [place] - [address] - P [phone] - F [phone] [address]  [place] [address]  Phone: [phone] Fax: [phone]      Notes:    Factors facilitating achievement of predicted outcomes: Cooperative and Pleasant    Barriers to discharge: Pain, Limited participation, Decreased endurance, Lower extremity weakness, Long standing deficits, Medical complications, and Medication managment    Additional Case Management Notes: FROM [facility]. CURRENTLY A&O X 4. AMB W WALKER, CAN PERFORM MOST ADLS IND. HAS DEX COM FOR DM MONITORING.     D/C PLAN REFUSING SNF PART OF [facility] SHE FEELS SHE CAN MANGE IN ASSISTED LIVING. RECOMMENDED [facility], SHE WILL REVIEW LIST & LET US KNOW.       The Plan for Transition of Care is related to the following treatment goals of Multiple fractures of ribs, right side, initial encounter for closed fracture [S22.41XA]    IF APPLICABLE: The Patient and/or patient representative Gemini and her family were provided with a choice of provider and agrees with the discharge plan. Freedom of choice list with basic dialogue that supports the patient's individualized plan of care/goals and shares the quality data associated with the  providers was provided to: Patient       The Patient and/or Patient Representative Agree with the Discharge Plan? Yes (PT DOES NOT WANT SKILLED PART OF [facility] WANTS TO RETURN TO ASSISTED LIVING)    Hannah Benítez, RN, BSN  Case Management Department

## 2024-03-19 NOTE — ACP (ADVANCE CARE PLANNING)
Advance Care Planning   Healthcare Decision Maker:    Primary Decision Maker: Ryan Molina - [phone]    Click here to complete Healthcare Decision Makers including selection of the Healthcare Decision Maker Relationship (ie \"Primary\").  Today we documented Decision Maker(s) consistent with Legal Next of Kin hierarchy.       Electronically signed by Hannah Benítez RN, BSN on 3/19/2024 at 12:18 PM

## 2024-03-20 ENCOUNTER — APPOINTMENT (OUTPATIENT)
Dept: GENERAL RADIOLOGY | Age: 64
DRG: 185 | End: 2024-03-20
Payer: MEDICARE

## 2024-03-20 LAB
ANION GAP SERPL CALCULATED.3IONS-SCNC: 9 MEQ/L (ref 9–15)
BUN SERPL-MCNC: 30 MG/DL (ref 8–23)
CALCIUM SERPL-MCNC: 8.8 MG/DL (ref 8.5–9.9)
CHLORIDE SERPL-SCNC: 106 MEQ/L (ref 95–107)
CO2 SERPL-SCNC: 25 MEQ/L (ref 20–31)
CREAT SERPL-MCNC: 0.87 MG/DL (ref 0.5–0.9)
ETHANOL PERCENT: NORMAL G/DL
ETHANOLAMINE SERPL-MCNC: <10 MG/DL (ref 0–0.08)
GLUCOSE BLD-MCNC: 112 MG/DL (ref 70–99)
GLUCOSE BLD-MCNC: 124 MG/DL (ref 70–99)
GLUCOSE BLD-MCNC: 134 MG/DL (ref 70–99)
GLUCOSE BLD-MCNC: 134 MG/DL (ref 70–99)
GLUCOSE SERPL-MCNC: 97 MG/DL (ref 70–99)
PERFORMED ON: ABNORMAL
PERFORMED ON: NORMAL
POC CREATININE: 0.9 MG/DL (ref 0.6–1.2)
POC SAMPLE TYPE: NORMAL
POTASSIUM SERPL-SCNC: 4.3 MEQ/L (ref 3.4–4.9)
SODIUM SERPL-SCNC: 140 MEQ/L (ref 135–144)

## 2024-03-20 PROCEDURE — 73110 X-RAY EXAM OF WRIST: CPT

## 2024-03-20 PROCEDURE — 96376 TX/PRO/DX INJ SAME DRUG ADON: CPT

## 2024-03-20 PROCEDURE — 6370000000 HC RX 637 (ALT 250 FOR IP): Performed by: SURGERY

## 2024-03-20 PROCEDURE — 2580000003 HC RX 258: Performed by: SURGERY

## 2024-03-20 PROCEDURE — G0378 HOSPITAL OBSERVATION PER HR: HCPCS

## 2024-03-20 PROCEDURE — 36415 COLL VENOUS BLD VENIPUNCTURE: CPT

## 2024-03-20 PROCEDURE — 6360000002 HC RX W HCPCS: Performed by: SURGERY

## 2024-03-20 PROCEDURE — 97166 OT EVAL MOD COMPLEX 45 MIN: CPT

## 2024-03-20 PROCEDURE — 6370000000 HC RX 637 (ALT 250 FOR IP)

## 2024-03-20 PROCEDURE — 80048 BASIC METABOLIC PNL TOTAL CA: CPT

## 2024-03-20 PROCEDURE — 82077 ASSAY SPEC XCP UR&BREATH IA: CPT

## 2024-03-20 PROCEDURE — 96372 THER/PROPH/DIAG INJ SC/IM: CPT

## 2024-03-20 RX ORDER — ACETAMINOPHEN 500 MG
1000 TABLET ORAL EVERY 8 HOURS SCHEDULED
Status: DISCONTINUED | OUTPATIENT
Start: 2024-03-20 | End: 2024-03-21 | Stop reason: HOSPADM

## 2024-03-20 RX ORDER — POLYETHYLENE GLYCOL 3350 17 G/17G
17 POWDER, FOR SOLUTION ORAL DAILY
Status: DISCONTINUED | OUTPATIENT
Start: 2024-03-20 | End: 2024-03-21 | Stop reason: HOSPADM

## 2024-03-20 RX ADMIN — PREGABALIN 50 MG: 25 CAPSULE ORAL at 09:03

## 2024-03-20 RX ADMIN — KETOROLAC TROMETHAMINE 30 MG: 30 INJECTION, SOLUTION INTRAMUSCULAR at 21:28

## 2024-03-20 RX ADMIN — LEVOTHYROXINE SODIUM 75 MCG: 0.07 TABLET ORAL at 05:35

## 2024-03-20 RX ADMIN — PREGABALIN 50 MG: 25 CAPSULE ORAL at 21:26

## 2024-03-20 RX ADMIN — BUSPIRONE HYDROCHLORIDE 5 MG: 5 TABLET ORAL at 09:04

## 2024-03-20 RX ADMIN — BUSPIRONE HYDROCHLORIDE 5 MG: 5 TABLET ORAL at 21:25

## 2024-03-20 RX ADMIN — LISINOPRIL 20 MG: 20 TABLET ORAL at 09:04

## 2024-03-20 RX ADMIN — ONDANSETRON 4 MG: 2 INJECTION INTRAMUSCULAR; INTRAVENOUS at 19:27

## 2024-03-20 RX ADMIN — METOPROLOL SUCCINATE 25 MG: 25 TABLET, EXTENDED RELEASE ORAL at 09:03

## 2024-03-20 RX ADMIN — METHOCARBAMOL 750 MG: 750 TABLET ORAL at 09:03

## 2024-03-20 RX ADMIN — ACETAMINOPHEN 325MG 650 MG: 325 TABLET ORAL at 05:35

## 2024-03-20 RX ADMIN — HYDROMORPHONE HYDROCHLORIDE 0.5 MG: 1 INJECTION, SOLUTION INTRAMUSCULAR; INTRAVENOUS; SUBCUTANEOUS at 21:27

## 2024-03-20 RX ADMIN — SODIUM CHLORIDE, PRESERVATIVE FREE 10 ML: 5 INJECTION INTRAVENOUS at 09:45

## 2024-03-20 RX ADMIN — DICYCLOMINE HYDROCHLORIDE 10 MG: 10 CAPSULE ORAL at 21:25

## 2024-03-20 RX ADMIN — METHOCARBAMOL 750 MG: 750 TABLET ORAL at 21:25

## 2024-03-20 RX ADMIN — DOCUSATE SODIUM AND SENNOSIDES 1 TABLET: 8.6; 5 TABLET, FILM COATED ORAL at 21:25

## 2024-03-20 RX ADMIN — KETOROLAC TROMETHAMINE 30 MG: 30 INJECTION, SOLUTION INTRAMUSCULAR at 13:12

## 2024-03-20 RX ADMIN — ENOXAPARIN SODIUM 30 MG: 100 INJECTION SUBCUTANEOUS at 21:27

## 2024-03-20 RX ADMIN — HYDROMORPHONE HYDROCHLORIDE 0.5 MG: 1 INJECTION, SOLUTION INTRAMUSCULAR; INTRAVENOUS; SUBCUTANEOUS at 17:57

## 2024-03-20 RX ADMIN — ACETAMINOPHEN 1000 MG: 500 TABLET ORAL at 21:27

## 2024-03-20 RX ADMIN — KETOROLAC TROMETHAMINE 30 MG: 30 INJECTION, SOLUTION INTRAMUSCULAR at 05:35

## 2024-03-20 RX ADMIN — SODIUM CHLORIDE, PRESERVATIVE FREE 10 ML: 5 INJECTION INTRAVENOUS at 21:32

## 2024-03-20 RX ADMIN — AMITRIPTYLINE HYDROCHLORIDE 5 MG: 10 TABLET, FILM COATED ORAL at 21:26

## 2024-03-20 RX ADMIN — ACETAMINOPHEN 1000 MG: 500 TABLET ORAL at 13:13

## 2024-03-20 RX ADMIN — INSULIN GLARGINE 15 UNITS: 100 INJECTION, SOLUTION SUBCUTANEOUS at 09:44

## 2024-03-20 RX ADMIN — METHOCARBAMOL 750 MG: 750 TABLET ORAL at 17:57

## 2024-03-20 RX ADMIN — METHOCARBAMOL 750 MG: 750 TABLET ORAL at 13:13

## 2024-03-20 RX ADMIN — ATORVASTATIN CALCIUM 10 MG: 10 TABLET, FILM COATED ORAL at 21:25

## 2024-03-20 RX ADMIN — ENOXAPARIN SODIUM 30 MG: 100 INJECTION SUBCUTANEOUS at 09:04

## 2024-03-20 RX ADMIN — DOCUSATE SODIUM AND SENNOSIDES 1 TABLET: 8.6; 5 TABLET, FILM COATED ORAL at 09:03

## 2024-03-20 RX ADMIN — DICYCLOMINE HYDROCHLORIDE 10 MG: 10 CAPSULE ORAL at 09:03

## 2024-03-20 RX ADMIN — DICYCLOMINE HYDROCHLORIDE 10 MG: 10 CAPSULE ORAL at 17:57

## 2024-03-20 RX ADMIN — DICYCLOMINE HYDROCHLORIDE 10 MG: 10 CAPSULE ORAL at 13:13

## 2024-03-20 RX ADMIN — HYDROMORPHONE HYDROCHLORIDE 0.5 MG: 1 INJECTION, SOLUTION INTRAMUSCULAR; INTRAVENOUS; SUBCUTANEOUS at 09:04

## 2024-03-20 ASSESSMENT — PAIN DESCRIPTION - LOCATION: LOCATION: ABDOMEN

## 2024-03-20 ASSESSMENT — PAIN DESCRIPTION - DESCRIPTORS: DESCRIPTORS: ACHING;DISCOMFORT

## 2024-03-20 ASSESSMENT — PAIN SCALES - GENERAL
PAINLEVEL_OUTOF10: 9
PAINLEVEL_OUTOF10: 9

## 2024-03-20 NOTE — PROGRESS NOTES
[facility] OCCUPATIONAL THERAPY EVALUATION - ACUTE     NAME: Gemini Molina  : 1960 (63 y.o.)  MRN: [id number]  CODE STATUS: Full Code  Room: W267/W267-01    Date of Service: 3/20/2024    Patient Diagnosis(es): Multiple fractures of ribs, right side, initial encounter for closed fracture [S22.41XA]   Patient Active Problem List    Diagnosis Date Noted    Multiple fractures of ribs, right side, initial encounter for closed fracture 2024    Neuropathy 2023    Agitation due to dementia ([facility]) 2023    Hypothyroidism 07/10/2023    Primary hypertension 07/10/2023    JACOB (acute kidney injury) ([facility]) 07/10/2023    Weakness 07/10/2023    Acute cystitis with hematuria 07/10/2023    Uncontrolled type 2 diabetes mellitus with hyperglycemia, with long-term current use of insulin ([facility]) 07/10/2023    Poorly controlled type 2 diabetes mellitus with neuropathy ([facility]) 07/10/2023        Past Medical History:   Diagnosis Date    Acute cystitis with hematuria 7/10/2023    Agitation due to dementia ([facility]) 2023    JACOB (acute kidney injury) ([facility]) 7/10/2023    Fall 7/10/2023    Hypothyroidism 7/10/2023    Neuropathy 2023    Poorly controlled type 2 diabetes mellitus with neuropathy ([facility]) 7/10/2023    Primary hypertension 7/10/2023    Uncontrolled type 2 diabetes mellitus with hyperglycemia, with long-term current use of insulin ([facility]) 7/10/2023    Weakness 7/10/2023     No past surgical history on file.     Restrictions  Restrictions/Precautions: Fall Risk, Up as Tolerated       Safety Devices: Safety Devices  Type of Devices: All fall risk precautions in place;Bed alarm in place;Call light within reach;Left in bed     Patient's date of birth confirmed: Yes    General:  Chart Reviewed: Yes  Patient assessed for rehabilitation services?: Yes    Subjective  Subjective: \"I'm medicated right now, so I feel okay.\"       Pain at start of treatment: No    Pain at end of treatment: No    Location: R  actively participate with task completion     Plan:  Occupational Therapy Plan  Times Per Week: 1-4x  Therapy Duration:  (Length of acute stay)  Current Treatment Recommendations: Strengthening, Balance training, Functional mobility training, Endurance training, Pain management, Safety education & training, Patient/Caregiver education & training, Equipment evaluation, education, & procurement, Self-Care / ADL    Goals:   Patient will:    - Improve functional endurance to tolerate/complete 30 mins of ADL's  - Be Mod I in UB ADLs   - Be Mod I in LB ADLs  - Be Mod I in ADL transfers without LOB  - Be Mod I in toileting tasks  - Improve B UE strength and endurance to 4/5 in order to participate in self-care activities as projected.  - Access appropriate D/C site with as few architectural barriers as possible.  - Sequence self-care tasks with no verbal cues for safety    Patient Goal: Patient goals : \"I just want to get home to my apartment\"     Discussed and agreed upon: Yes Comments:       Therapy Time:   Individual   Time In 1018   Time Out 1037   Minutes 19     Eval: 19 minutes     Electronically signed by:    SHANTEL Edward/L,   3/20/2024, 12:42 PM

## 2024-03-20 NOTE — DISCHARGE SUMMARY
DISCHARGE SUMMARY   [place] [address] Gemini Molina  MRN: [id number]  YOB: 1960  63 y.o.female      Attending  Rohan Suggs MD ?   Date of Admission  3/19/2024 Date of Discharge  03/21/24      ?  DIAGNOSES:  Principal Problem:    Multiple fractures of ribs, right side, initial encounter for closed fracture  Active Problems:    Acute pain due to trauma  Resolved Problems:    Fall from standing         PROCEDURES:  None  ?    DISCHARGE MEDICATIONS:  Current Discharge Medication List             Details   methocarbamol (ROBAXIN) 750 MG tablet Take 1 tablet by mouth 4 times daily for 10 days  Qty: 40 tablet, Refills: 0      oxyCODONE (ROXICODONE) 5 MG immediate release tablet Take 1 tablet by mouth every 6 hours as needed (severe pain only. Pain rated 7-10 out of 10.) for up to 5 days. Max Daily Amount: 20 mg  Qty: 20 tablet, Refills: 0    Comments: Reduce doses taken as pain becomes manageable  Associated Diagnoses: Acute pain due to trauma; Multiple fractures of ribs, right side, initial encounter for closed fracture      sennosides-docusate sodium (SENOKOT-S) 8.6-50 MG tablet Take 1 tablet by mouth 2 times daily for 14 days For prevention of constipation while taking Oxycodone.  Qty: 28 tablet, Refills: 0                Details   pregabalin (LYRICA) 50 MG capsule Take 1 capsule by mouth 2 times daily for 180 days. Max Daily Amount: 100 mg  Qty: 60 capsule, Refills: 5    Associated Diagnoses: Neuropathy      acetaminophen (TYLENOL) 325 MG tablet Take 2 tablets by mouth every 4 hours as needed for Pain or Fever      busPIRone (BUSPAR) 5 MG tablet Take 1 tablet by mouth 2 times daily      empagliflozin (JARDIANCE) 10 MG tablet Take 1 tablet by mouth every morning      loperamide (IMODIUM A-D) 2 MG tablet Take 1 tablet by mouth See Admin Instructions Give 2 tabs after 1st loose stools,  then 1 tab prn.  Not to exceed 8 tabs daily      ondansetron (ZOFRAN)

## 2024-03-20 NOTE — PROGRESS NOTES
Pt complained of nausea during shift change report.  No emesis noted at this time, emesis basin given.  Medicated with PRN IV Zofran.  Report given to nightshift RN, will update on PRN me given.

## 2024-03-20 NOTE — PROGRESS NOTES
TRAUMA DAILY PROGRESS NOTE      Patient Name: Gemini Molina  Admission Date 3/19/2024    Hospital Day: 0  Patient seen and examined on 3/20/2024    INTERVAL HISTORY/EVENTS     Background:  Gemini Molina is a 63 y.o. female with a PMHx of DMT2, HTN, HLD, and hypothyroidism presents to the ED after a fall from standing.  The patient recently moved to assisted living. She ambulates with a walker.  During ambulation, she slipped and fell into the walker.  She complains of pain in her right anterior chest.  +head strike +LOC -thinners.     Trauma workup revealed right anterior 5-6 rib fx. Admitted for PT/OT/pain control.       Hospital Course:  3/19/2024: S/p FFS resulting in right 5-6 rib fx.       24 Hour Events:  No acute overnight events.     Patient overall doing well. Endorses pain to right chest. She does not believe she's gotten pain medications this morning, but states her pain is a 9/10. Still adherent to IS. Pulling about 1100cc on IS. C/o new left wrist tenderness exacerbated with movement.       VSS. Saturating >98% on RA. Slight up trend in BUN 30(22). Cr stable 0.87(0.84).     BM x0 since admit      PHYSICAL EXAM     Vitals Average, Min and Max for last 24 hours:  Temp: Temp: 97.7 °F (36.5 °C); Temp  Av.8 °F (36.6 °C)  Min: 97.3 °F (36.3 °C)  Max: 98.2 °F (36.8 °C)  Respirations: Resp  Av.5  Min: 16  Max: 18  Pulse: Pulse  Av.4  Min: 67  Max: 80  Blood Pressure: Systolic (24hrs), Av , Min:117 , Max:156   ; Diastolic (24hrs), Av, Min:46, Max:104    SpO2: SpO2  Av.2 %  Min: 98 %  Max: 100 %    24hr Intake/Output: No intake or output data in the 24 hours ending 24 1017      Vitals: BP (!) 145/76   Pulse 69   Temp 97.7 °F (36.5 °C) (Oral)   Resp 18   Ht 1.702 m (5' 7\")   Wt 106.6 kg (235 lb)   SpO2 99%   BMI 36.81 kg/m²     Physical Exam:  Constitutional: NAD. Well appearing. Appears to be in mild pain.   HEENT: Atraumatic normocephalic.   Cardiovascular:  Remains afebrile, normotensive, and without leukocytosis.  - No Abx indication    Heme: HDS. H/H stable  - Obtain CBC prn   - No indication for transfusion. Transfuse for Hgb <7.      Endocrine: Hx of DMT2 and hypothyroidism   - Continue medium dose corrective algorithm SSI  - Continue Lantus 15U in AM  - Hypoglycemia protocol in place  - Continue home Synthroid 75mcg daily     MSK:   - Spines: clear  - Weight Bearing Restrictions: WBAT  - Activity: OOBAT  - PT/OT: to evaluate patient today     Prophylaxis:   - SCDs and Lovenox 30mg BID for VTE PPx    Lines/Tubes/Drains:  - Maintain PIVs  - No indication for lopez catheter, monitor for urinary retention    Dispo: Remain on RNF while awaiting PT/OT assessment and ensuring adequate PO pain control. Anticipate medical clearance in 1-2 days.   Accom Status: General Status      - Follow up with trauma prn for your rib fractures  - Follow up with your PCP regarding your incidental findings      Please call for any questions or concerns.  [see Treatment Team Sticky Note for contact information]      Carmen Luna PA-C  Trauma/Critical Care  [see Treatment Team Sticky Note for contact information]     This patient's plan of care was discussed and made in collaboration with Trauma Attending physician, Rohan Suggs MD.    Teaching Physician Note:  I saw and evaluated the patient.  I personally obtained the key and critical portions of the history and physical exam.  I reviewed the ROSENDO's documentation and discussed the patient with the ROSENDO.  I agree with the ROSENDO's medical decision making as documented in the ROSENDO note.      Rohan Suggs MD

## 2024-03-21 VITALS
HEART RATE: 65 BPM | HEIGHT: 67 IN | RESPIRATION RATE: 16 BRPM | TEMPERATURE: 97.3 F | DIASTOLIC BLOOD PRESSURE: 60 MMHG | WEIGHT: 235 LBS | OXYGEN SATURATION: 97 % | BODY MASS INDEX: 36.88 KG/M2 | SYSTOLIC BLOOD PRESSURE: 127 MMHG

## 2024-03-21 PROBLEM — G89.11 ACUTE PAIN DUE TO TRAUMA: Status: ACTIVE | Noted: 2024-03-19
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
## 2024-03-21 LAB
GLUCOSE BLD-MCNC: 135 MG/DL (ref 70–99)
GLUCOSE BLD-MCNC: 156 MG/DL (ref 70–99)
PERFORMED ON: ABNORMAL
PERFORMED ON: ABNORMAL

## 2024-03-21 PROCEDURE — 1210000000 HC MED SURG R&B

## 2024-03-21 PROCEDURE — 6370000000 HC RX 637 (ALT 250 FOR IP): Performed by: SURGERY

## 2024-03-21 PROCEDURE — 2580000003 HC RX 258: Performed by: SURGERY

## 2024-03-21 PROCEDURE — 96372 THER/PROPH/DIAG INJ SC/IM: CPT

## 2024-03-21 PROCEDURE — 6360000002 HC RX W HCPCS: Performed by: SURGERY

## 2024-03-21 PROCEDURE — 6370000000 HC RX 637 (ALT 250 FOR IP)

## 2024-03-21 PROCEDURE — 97162 PT EVAL MOD COMPLEX 30 MIN: CPT

## 2024-03-21 PROCEDURE — 6370000000 HC RX 637 (ALT 250 FOR IP): Performed by: PHYSICIAN ASSISTANT

## 2024-03-21 PROCEDURE — 96376 TX/PRO/DX INJ SAME DRUG ADON: CPT

## 2024-03-21 RX ORDER — METHOCARBAMOL 750 MG/1
750 TABLET, FILM COATED ORAL 4 TIMES DAILY
Qty: 40 TABLET | Refills: 0 | Status: SHIPPED | OUTPATIENT
Start: 2024-03-21 | End: 2024-03-31

## 2024-03-21 RX ORDER — OXYCODONE HYDROCHLORIDE 5 MG/1
5 TABLET ORAL EVERY 4 HOURS PRN
Status: DISCONTINUED | OUTPATIENT
Start: 2024-03-21 | End: 2024-03-21 | Stop reason: HOSPADM

## 2024-03-21 RX ORDER — OXYCODONE HYDROCHLORIDE 5 MG/1
2.5 TABLET ORAL EVERY 4 HOURS PRN
Status: DISCONTINUED | OUTPATIENT
Start: 2024-03-21 | End: 2024-03-21 | Stop reason: HOSPADM

## 2024-03-21 RX ORDER — SENNA AND DOCUSATE SODIUM 50; 8.6 MG/1; MG/1
1 TABLET, FILM COATED ORAL 2 TIMES DAILY
Qty: 28 TABLET | Refills: 0 | Status: SHIPPED | OUTPATIENT
Start: 2024-03-21 | End: 2024-04-04

## 2024-03-21 RX ORDER — OXYCODONE HYDROCHLORIDE 5 MG/1
5 TABLET ORAL EVERY 6 HOURS PRN
Qty: 20 TABLET | Refills: 0 | Status: SHIPPED | OUTPATIENT
Start: 2024-03-21 | End: 2024-03-26

## 2024-03-21 RX ADMIN — POLYETHYLENE GLYCOL 3350 17 G: 17 POWDER, FOR SOLUTION ORAL at 08:25

## 2024-03-21 RX ADMIN — ACETAMINOPHEN 1000 MG: 500 TABLET ORAL at 13:48

## 2024-03-21 RX ADMIN — KETOROLAC TROMETHAMINE 30 MG: 30 INJECTION, SOLUTION INTRAMUSCULAR at 11:40

## 2024-03-21 RX ADMIN — LEVOTHYROXINE SODIUM 75 MCG: 0.07 TABLET ORAL at 06:09

## 2024-03-21 RX ADMIN — ACETAMINOPHEN 1000 MG: 500 TABLET ORAL at 06:10

## 2024-03-21 RX ADMIN — ENOXAPARIN SODIUM 30 MG: 100 INJECTION SUBCUTANEOUS at 08:25

## 2024-03-21 RX ADMIN — OXYCODONE 5 MG: 5 TABLET ORAL at 09:37

## 2024-03-21 RX ADMIN — METHOCARBAMOL 750 MG: 750 TABLET ORAL at 13:48

## 2024-03-21 RX ADMIN — LISINOPRIL 20 MG: 20 TABLET ORAL at 08:28

## 2024-03-21 RX ADMIN — SODIUM CHLORIDE, PRESERVATIVE FREE 5 ML: 5 INJECTION INTRAVENOUS at 08:28

## 2024-03-21 RX ADMIN — HYDROMORPHONE HYDROCHLORIDE 0.5 MG: 1 INJECTION, SOLUTION INTRAMUSCULAR; INTRAVENOUS; SUBCUTANEOUS at 02:49

## 2024-03-21 RX ADMIN — INSULIN GLARGINE 15 UNITS: 100 INJECTION, SOLUTION SUBCUTANEOUS at 08:27

## 2024-03-21 RX ADMIN — DICYCLOMINE HYDROCHLORIDE 10 MG: 10 CAPSULE ORAL at 08:28

## 2024-03-21 RX ADMIN — PREGABALIN 50 MG: 25 CAPSULE ORAL at 08:27

## 2024-03-21 RX ADMIN — DICYCLOMINE HYDROCHLORIDE 10 MG: 10 CAPSULE ORAL at 13:48

## 2024-03-21 RX ADMIN — METHOCARBAMOL 750 MG: 750 TABLET ORAL at 08:27

## 2024-03-21 RX ADMIN — BUSPIRONE HYDROCHLORIDE 5 MG: 5 TABLET ORAL at 08:28

## 2024-03-21 RX ADMIN — KETOROLAC TROMETHAMINE 30 MG: 30 INJECTION, SOLUTION INTRAMUSCULAR at 06:10

## 2024-03-21 RX ADMIN — METOPROLOL SUCCINATE 25 MG: 25 TABLET, EXTENDED RELEASE ORAL at 08:28

## 2024-03-21 ASSESSMENT — PAIN SCALES - GENERAL
PAINLEVEL_OUTOF10: 9
PAINLEVEL_OUTOF10: 8
PAINLEVEL_OUTOF10: 9
PAINLEVEL_OUTOF10: 7
PAINLEVEL_OUTOF10: 8
PAINLEVEL_OUTOF10: 9

## 2024-03-21 ASSESSMENT — PAIN DESCRIPTION - ORIENTATION
ORIENTATION: RIGHT

## 2024-03-21 ASSESSMENT — PAIN DESCRIPTION - DESCRIPTORS
DESCRIPTORS: SHARP

## 2024-03-21 ASSESSMENT — PAIN DESCRIPTION - LOCATION
LOCATION: CHEST

## 2024-03-21 NOTE — DISCHARGE INSTR - DIET

## 2024-03-21 NOTE — CARE COORDINATION
FOC IS OFFERED, PT WOULD LIKE [facility]. REFERRAL TO [facility] AT [facility], CAN ACCEPT. ORDERS OBTAINED.

## 2024-03-21 NOTE — DISCHARGE INSTR - COC
Continuity of Care Form    Patient Name: Gemini Moilna   :  1960  MRN:  [id number]    Admit date:  3/19/2024  Discharge date:  ***    Code Status Order: Full Code   Advance Directives:     Admitting Physician:  Rohan Suggs MD  PCP: Jhonatan Donovan MD    Discharging Nurse: ***  Discharging Hospital Unit/Room#: W267/W267-01  Discharging Unit Phone Number: ***    Emergency Contact:   Extended Emergency Contact Information  Primary Emergency Contact: Ryan Molina  Home Phone: [phone]  Relation: Child    Past Surgical History:  No past surgical history on file.    Immunization History:   Immunization History   Administered Date(s) Administered    COVID-19, PFIZER Bivalent, DO NOT Dilute, (age 12y+), IM, 30 mcg/0.3 mL 2022    COVID-19, PFIZER GRAY top, DO NOT Dilute, (age 12 y+), IM, 30 mcg/0.3 mL 2022    COVID-19, PFIZER PURPLE top, DILUTE for use, (age 12 y+), 30mcg/0.3mL 2021, 2021, 10/28/2021    TDaP, ADACEL (age 10y-64y), BOOSTRIX (age 10y+), IM, 0.5mL 2024       Active Problems:  Patient Active Problem List   Diagnosis Code    Hypothyroidism E03.9    Primary hypertension I10    JACOB (acute kidney injury) ([facility]) N17.9    Weakness R53.1    Acute cystitis with hematuria N30.01    Uncontrolled type 2 diabetes mellitus with hyperglycemia, with long-term current use of insulin ([facility]) E11.65, Z79.4    Poorly controlled type 2 diabetes mellitus with neuropathy ([facility]) E11.40, E11.65    Neuropathy G62.9    Agitation due to dementia ([facility]) F03.911    Multiple fractures of ribs, right side, initial encounter for closed fracture S22.41XA       Isolation/Infection:   Isolation            No Isolation          Patient Infection Status       None to display            Nurse Assessment:  Last Vital Signs: /60   Pulse 65   Temp 97.3 °F (36.3 °C) (Oral)   Resp 16   Ht 1.702 m (5' 7\")   Wt 106.6 kg (235 lb)   SpO2 97%   BMI 36.81 kg/m²     Last documented pain score (0-10

## 2024-03-21 NOTE — DISCHARGE INSTRUCTIONS
Discharge Instructions    Date of admission: 3/19/2024  Date of discharge: 3/21/2024    You are being discharged to assisted living.    Follow up:  - Please call to schedule your follow-up appointments - information provided separately.  - You will need to follow up with:  Follow up with Trauma Surgery as needed for your rib fractures  Follow up with your Primary Care Provider for your incidental findings  - See below for information regarding contacting your primary care physician or establishing care with [facility] if you do not already have one.      Pain control:  - Take Tylenol 325 mg, 1-2 tablets every 4 hours as needed for mild to moderate pain. Mild to moderate pain is characterized by pain 1-6 out of 10 on a pain scale.  - Take Oxycodone 5 mg, 1 tablet every 6 hours for as needed for severe pain only. Severe pain is characterized as pain of 7-10 out of 10 on a pain scale.   - It is okay to take Oxycodone and Tylenol together if needed.  If taking Robaxin and Oxycodone, space them out by 1 hour.  - Please begin to wean off of your pain medications as soon as possible.    - To do this, start taking Oxycodone every 8 hours instead of every 6, then every 12 hours, then only once per day if needed. Then stop.   - You may use Motrin and Tylenol until your pain is diminished enough for you to tolerate your pain.  - Your pain medication may be adjusted or discontinued in follow-up appointments, or by the supervising physician in an inpatient rehab setting.   - Please do not drive within 24 hours of taking Oxycodone or any Opiate medication.     Incentive Spirometer:  - Continue to use your Incentive Spirometer (IS) every hour at least 5-10 times per hour.  - Your goal is to reach ~1,000 on the IS.  - If you notice you are not reaching your goal after multiple attempts in 1 day, please call our office.  - If you are having shortness or breath or difficulty breathing, go to the emergency department.       Update  your primary care physician or establish care:  Please see your primary care physician at the next available appointment for follow up.  Please call either on the day of your discharge, or the day after, to make the appointment.  If you are followed by a managed care company or if your insurance requires, call your physician for authorization to be seen in a specialty clinic.     If you do not have a primary physician please call [phone] for guidance on finding a [facility] provider.     If you have questions or concerns, if your condition worsens or you  develop new symptoms please call the Trauma Care Line at [phone].    ---------------------------------------------------------------------------------------------------------------------

## 2024-03-21 NOTE — PROGRESS NOTES
Transfuse for Hgb <7.      Endocrine: Hx of DMT2 and hypothyroidism   - Continue medium dose corrective algorithm SSI  - Continue Lantus 15U in AM  - Hypoglycemia protocol in place  - Continue home Synthroid 75mcg daily     MSK:   - Spines: clear  - Weight Bearing Restrictions: WBAT  - Activity: OOBAT  - PT/OT: to evaluate patient today     Prophylaxis:   - SCDs and Lovenox 30mg BID for VTE PPx    Lines/Tubes/Drains:  - Maintain PIVs  - No indication for lopez catheter, monitor for urinary retention    Dispo: Medically cleared for discharge.  Accom Status: General Status      - Follow up with trauma prn for your rib fractures  - Follow up with your PCP regarding your incidental findings      Please call for any questions or concerns.  [see Treatment Team Sticky Note for contact information]      Ora Olsen PA-C  Trauma/Critical Care  [see Treatment Team Sticky Note for contact information]     This patient's plan of care was discussed and made in collaboration with Trauma Attending physician, Rohan Suggs MD.

## 2024-03-21 NOTE — CARE COORDINATION
Pt did well with therapy and wants to return to her assisted living apartment with [facility] [place].  [facility].  3:30  ARRANGED.

## 2024-03-21 NOTE — PLAN OF CARE
Problem: Pain  Goal: Verbalizes/displays adequate comfort level or baseline comfort level  Outcome: Progressing     Problem: ABCDS Injury Assessment  Goal: Absence of physical injury  Outcome: Progressing     Problem: Safety - Adult  Goal: Free from fall injury  Outcome: Progressing     Problem: Chronic Conditions and Co-morbidities  Goal: Patient's chronic conditions and co-morbidity symptoms are monitored and maintained or improved  Outcome: Progressing  Flowsheets (Taken 3/20/2024 0940 by Katerine Garcia, RN)  Care Plan - Patient's Chronic Conditions and Co-Morbidity Symptoms are Monitored and Maintained or Improved: Monitor and assess patient's chronic conditions and comorbid symptoms for stability, deterioration, or improvement     Problem: Occupational Therapy - Adult  Goal: By Discharge: Performs self-care activities at highest level of function for planned discharge setting.  See evaluation for individualized goals.  3/20/2024 1244 by Noreen Fulton, OTR/L  Outcome: Progressing

## 2024-03-21 NOTE — CARE COORDINATION
This LSW set up transportation with [facility] for DC at 3:30 today.  Nurse, and patient notified.  Kiara sauceda Cadott notified.  Electronically signed by SEBASTIAN Owens on 3/21/24 at 2:03 PM EDT

## 2024-03-21 NOTE — PLAN OF CARE
Problem: Pain  Goal: Verbalizes/displays adequate comfort level or baseline comfort level  3/21/2024 1019 by Poonam Singh RN  Outcome: Progressing  3/20/2024 2322 by Hannah Spaulding RN  Outcome: Progressing     Problem: ABCDS Injury Assessment  Goal: Absence of physical injury  3/21/2024 1019 by Poonam Singh RN  Outcome: Progressing  3/20/2024 2322 by Hannah Spaulding RN  Outcome: Progressing     Problem: Safety - Adult  Goal: Free from fall injury  3/21/2024 1019 by Poonam Singh RN  Outcome: Progressing  3/20/2024 2322 by Hannah Spaulding RN  Outcome: Progressing     Problem: Chronic Conditions and Co-morbidities  Goal: Patient's chronic conditions and co-morbidity symptoms are monitored and maintained or improved  3/21/2024 1019 by Poonam Singh RN  Outcome: Progressing  3/20/2024 2322 by Hannah Spaulding RN  Outcome: Progressing  Flowsheets (Taken 3/20/2024 0940 by Katerine Garcia, RN)  Care Plan - Patient's Chronic Conditions and Co-Morbidity Symptoms are Monitored and Maintained or Improved: Monitor and assess patient's chronic conditions and comorbid symptoms for stability, deterioration, or improvement

## 2024-03-21 NOTE — PROGRESS NOTES
Physical Therapy Med Surg Initial Assessment  Facility/Department: [address] ORTHO TELE  Room: [place] [id number]       NAME: Gemini Molina  : 1960 (63 y.o.)  MRN: [id number]  CODE STATUS: Full Code    Date of Service: 3/21/2024    Patient Diagnosis(es): Multiple fractures of ribs, right side, initial encounter for closed fracture [S22.41XA]   Chief Complaint   Patient presents with    Fall     Hit chest on walker     Patient Active Problem List    Diagnosis Date Noted    Multiple fractures of ribs, right side, initial encounter for closed fracture 2024    Neuropathy 2023    Agitation due to dementia ([facility]) 2023    Hypothyroidism 07/10/2023    Primary hypertension 07/10/2023    JACOB (acute kidney injury) ([facility]) 07/10/2023    Weakness 07/10/2023    Acute cystitis with hematuria 07/10/2023    Uncontrolled type 2 diabetes mellitus with hyperglycemia, with long-term current use of insulin ([facility]) 07/10/2023    Poorly controlled type 2 diabetes mellitus with neuropathy ([facility]) 07/10/2023        Past Medical History:   Diagnosis Date    Acute cystitis with hematuria 7/10/2023    Agitation due to dementia ([facility]) 2023    JACOB (acute kidney injury) ([facility]) 7/10/2023    Fall 7/10/2023    Hypothyroidism 7/10/2023    Neuropathy 2023    Poorly controlled type 2 diabetes mellitus with neuropathy ([facility]) 7/10/2023    Primary hypertension 7/10/2023    Uncontrolled type 2 diabetes mellitus with hyperglycemia, with long-term current use of insulin ([facility]) 7/10/2023    Weakness 7/10/2023     No past surgical history on file.    Chart Reviewed: Yes  Family / Caregiver Present: No  Diagnosis: Multiple fractures of ribs, right side, initial encounter for closed fracture  General Comment  Comments: Pt resting in bed - agreeeable to PT evaluation    Restrictions:  Restrictions/Precautions: Fall Risk, Up as Tolerated     SUBJECTIVE:   Subjective: \"I know I can do it.\"    Pain  Pain: 9/10 rib pain pre and post session. RN

## 2024-03-22 NOTE — ADT AUTH CERT
Utilization Reviews       3/21/24-   Last Updated by Joelle Garcia RN on 3/21/2024 1332     Review Status Created By   In Primary Joelle Garcia RN       Review Type Associated Date   -- 3/21/2024      Criteria Review   DATE: 3/21/24-IP D1  Type of bed: acute/trauma     RELEVANT BASELINES: (lab values, vitals, o2 amount/delivery, etc.)  From assisted living   Amb w/ [facility]     PERTINENT UPDATES:  Ambulating round halls with PT/OT   Pulling volumes >1,000cc on IS.  Transitioned to PO pain medications  On RA   PT DC Recommendations: Return to AL with HHC      VITALS:  /60  Pulse 65  Temp 97.3 °F (36.3 °C) (Oral)  Resp 16  Ht 1.702 m (5' 7\")  Wt 106.6 kg (235 lb)  SpO2 97%  BMI 36.81 kg/m²          Gross per 24 hour   Intake --   Output 800 ml   Net -800 ml      PHYSICAL EXAM:  Constitutional: Alert, conversant and appears in no acute distress. Ambulating around halls with PT/OT.  HEENT: Atraumatic normocephalic.   Cardiovascular: Regular rate and rhythm.   Pulmonary: Breaths unlabored on room air.  Abdominal: Soft. Non-distended. Non-tender.   Musculoskeletal: Good ROM in all extremities. No edema.   Neurological: Alert, awake, and orientated x 3. Motor and sensory grossly intact. No focal deficits. GCS of 15.   Skin: Warm and dry.      MD CONSULTS/ASSESSMENT AND PLAN:  Trauma surgery plan:  Neurological: Acute pain due to trauma. Poor pain control- still requiring Dilaudid. Anaphylaxis-like reaction to oxycodone per patient.   - Continue Tylenol 1g q8h  - Continue Toradol 30mg IV q6h  - Continue Robaxin 750mg QID  - Continue Lidocaine patches  - Discontinue Dilaudid 0.5mg  - Start Oxycodone 2.5/5mg q4hrs prn Moderate/severe pain  - Continue home Amitriptyline, Buspar, and Lyrica     Cardiovascular: VSS. Hx of HTN, HLD.  - Continue home Lipitor, Lisinopril, and Metoprolol  - Obtain VS per unit protocol     Respiratory: Right 5-6 rib fx. Pulling 1000 on IS. IS goal 980. Saturating >98% on RA.   -    PT/OT/SLP/CM/RN ASSESSMENT OR NOTES:  PT note:  [facility] (6 CLICK) BASIC MOBILITY  AM-PAC Inpatient Mobility Raw Score : 18   Assessment: Continued PT indicated to progress mobility and facilitate DC home. Reports high level pain however is not limited in her mobility from her pain. Noted safety concerns with WW management. Education provided for correction. Reviewed incentive spirometer guidelines. Pt verbalizes and demonstrates understanding. Rec f/u PT upon DC for further walker and mobility/balance training.  Requires PT Follow-Up: Yes        3/21/24-PA REC IP   Last Updated by Joelle Garcia, RN on 3/21/2024 0840     Review Status Created By   In Primary Joelle Garcia RN       Review Type Associated Date   -- 3/21/2024      Criteria Review   Note text:obs list upgrade            We recommend that the following pt's current hospitalization under OBSERVATION status is upgraded to INPATIENT; if you agree, please place a new ADMIT order in [facility] as recommended. .           Name: Gemini Molina   : 1960   CSN: [id number]   INSURANCE: [facility] Medicare               Clinical summary Pt w/ chest pain   Vitals Baseline   Labs and Imaging Baseline   MCG criteria applies yes,   Comments Pt w/ chest pain   1. S/p FFS on 3/19/2024   2. Right 5-6 rib fx   3. Acute pain due to trauma           Incidental Findings:.   - A 0.7 cm pleural-based nodular density is identified involving the   posterior right lower lobe. This can be seen on CT scans of the abdomen and   pelvis dating back to 2023.   - If the patient is high risk for malignancy, consider an additional   noncontrast chest CT at 18-24 months. If the patient is low risk for   malignancy, a noncontrast chest CT at 18-24 months is optional. These   guidelines do not apply to immunocompromised patients and patients with cancer. Follow up in patients with significant comorbidities as clinically warranted. For lung cancer screening, adhere to Lung-RADS

## 2024-03-22 NOTE — PROGRESS NOTES
Physical Therapy  Facility/Department: [facility] MED SURG W267/W267-01  Physical Therapy Discharge      NAME: Gemini Molina    : 1960 (63 y.o.)  MRN: [id number]    Account: [id number]  Gender: female      Patient has been discharged from acute care hospital. DC patient from current PT program.      Electronically signed by Concha Rizzo PT on 3/22/24 at 2:08 PM EDT

## 2024-04-12 ENCOUNTER — NURSE ONLY (OUTPATIENT)
Dept: GERIATRIC MEDICINE | Age: 64
End: 2024-04-12

## 2024-04-12 VITALS
HEART RATE: 89 BPM | DIASTOLIC BLOOD PRESSURE: 64 MMHG | RESPIRATION RATE: 18 BRPM | OXYGEN SATURATION: 97 % | TEMPERATURE: 97.6 F | SYSTOLIC BLOOD PRESSURE: 124 MMHG

## 2024-04-12 DIAGNOSIS — Z01.30 BP CHECK: Primary | ICD-10-CM

## 2024-05-02 ENCOUNTER — OFFICE VISIT (OUTPATIENT)
Dept: GERIATRIC MEDICINE | Age: 64
End: 2024-05-02

## 2024-05-02 DIAGNOSIS — Z79.4 UNCONTROLLED TYPE 2 DIABETES MELLITUS WITH HYPERGLYCEMIA, WITH LONG-TERM CURRENT USE OF INSULIN (HCC): ICD-10-CM

## 2024-05-02 DIAGNOSIS — S22.41XS CLOSED FRACTURE OF MULTIPLE RIBS OF RIGHT SIDE, SEQUELA: ICD-10-CM
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
## 2024-05-02 DIAGNOSIS — E11.65 UNCONTROLLED TYPE 2 DIABETES MELLITUS WITH HYPERGLYCEMIA, WITH LONG-TERM CURRENT USE OF INSULIN (HCC): ICD-10-CM

## 2024-05-02 DIAGNOSIS — E03.9 HYPOTHYROIDISM, UNSPECIFIED TYPE: ICD-10-CM

## 2024-05-21 ENCOUNTER — HOSPITAL ENCOUNTER (EMERGENCY)
Age: 64
Discharge: HOME | End: 2024-05-21
Payer: MEDICARE

## 2024-05-21 ENCOUNTER — APPOINTMENT (OUTPATIENT)
Dept: GENERAL RADIOLOGY | Age: 64
End: 2024-05-21
Payer: MEDICARE

## 2024-05-21 ENCOUNTER — APPOINTMENT (OUTPATIENT)
Dept: CT IMAGING | Age: 64
End: 2024-05-21
Payer: MEDICARE

## 2024-05-21 VITALS
SYSTOLIC BLOOD PRESSURE: 154 MMHG | DIASTOLIC BLOOD PRESSURE: 111 MMHG | HEART RATE: 85 BPM | TEMPERATURE: 97.5 F | RESPIRATION RATE: 22 BRPM | WEIGHT: 220 LBS | BODY MASS INDEX: 34.53 KG/M2 | OXYGEN SATURATION: 98 % | HEIGHT: 67 IN

## 2024-05-21 DIAGNOSIS — I10 ESSENTIAL HYPERTENSION: ICD-10-CM

## 2024-05-21 DIAGNOSIS — R42 DIZZINESS: Primary | ICD-10-CM

## 2024-05-21 LAB
ALBUMIN SERPL-MCNC: 3.6 G/DL (ref 3.5–4.6)
ALP SERPL-CCNC: 90 U/L (ref 40–130)
ALT SERPL-CCNC: 16 U/L (ref 0–33)
ANION GAP SERPL CALCULATED.3IONS-SCNC: 12 MEQ/L (ref 9–15)
AST SERPL-CCNC: 12 U/L (ref 0–35)
BASOPHILS # BLD: 0 K/UL (ref 0–0.2)
BASOPHILS NFR BLD: 0.2 %
BILIRUB SERPL-MCNC: 0.4 MG/DL (ref 0.2–0.7)
BILIRUB UR QL STRIP: NEGATIVE
BUN SERPL-MCNC: 22 MG/DL (ref 8–23)
CALCIUM SERPL-MCNC: 9 MG/DL (ref 8.5–9.9)
CHLORIDE SERPL-SCNC: 106 MEQ/L (ref 95–107)
CLARITY UR: CLEAR
CO2 SERPL-SCNC: 24 MEQ/L (ref 20–31)
COLOR UR: YELLOW
CREAT SERPL-MCNC: 1.09 MG/DL (ref 0.5–0.9)
EOSINOPHIL # BLD: 0.1 K/UL (ref 0–0.7)
EOSINOPHIL NFR BLD: 0.9 %
ERYTHROCYTE [DISTWIDTH] IN BLOOD BY AUTOMATED COUNT: 13.5 % (ref 11.5–14.5)
GLOBULIN SER CALC-MCNC: 3.2 G/DL (ref 2.3–3.5)
GLUCOSE BLD-MCNC: 155 MG/DL (ref 70–99)
GLUCOSE SERPL-MCNC: 309 MG/DL (ref 70–99)
GLUCOSE UR STRIP-MCNC: >=1000 MG/DL
HCT VFR BLD AUTO: 44.1 % (ref 37–47)
HGB BLD-MCNC: 14.1 G/DL (ref 12–16)
HGB UR QL STRIP: NEGATIVE
KETONES UR STRIP-MCNC: NEGATIVE MG/DL
LACTATE BLDV-SCNC: 2.9 MMOL/L (ref 0.5–2.2)
LEUKOCYTE ESTERASE UR QL STRIP: NEGATIVE
LYMPHOCYTES # BLD: 2.3 K/UL (ref 1–4.8)
LYMPHOCYTES NFR BLD: 18.9 %
MCH RBC QN AUTO: 29.6 PG (ref 27–31.3)
MCHC RBC AUTO-ENTMCNC: 32 % (ref 33–37)
MCV RBC AUTO: 92.5 FL (ref 79.4–94.8)
MONOCYTES # BLD: 0.9 K/UL (ref 0.2–0.8)
MONOCYTES NFR BLD: 7.2 %
NEUTROPHILS # BLD: 8.8 K/UL (ref 1.4–6.5)
NEUTS SEG NFR BLD: 72.5 %
NITRITE UR QL STRIP: NEGATIVE
PERFORMED ON: ABNORMAL
PH UR STRIP: 5 [PH] (ref 5–9)
PLATELET # BLD AUTO: 291 K/UL (ref 130–400)
POTASSIUM SERPL-SCNC: 4.3 MEQ/L (ref 3.4–4.9)
PROT SERPL-MCNC: 6.8 G/DL (ref 6.3–8)
PROT UR STRIP-MCNC: NEGATIVE MG/DL
RBC # BLD AUTO: 4.77 M/UL (ref 4.2–5.4)
SODIUM SERPL-SCNC: 142 MEQ/L (ref 135–144)
SP GR UR STRIP: 1.03 (ref 1–1.03)
URINE REFLEX TO CULTURE: ABNORMAL
UROBILINOGEN UR STRIP-ACNC: 0.2 E.U./DL
WBC # BLD AUTO: 12.2 K/UL (ref 4.8–10.8)

## 2024-05-21 PROCEDURE — 70450 CT HEAD/BRAIN W/O DYE: CPT

## 2024-05-21 PROCEDURE — 71045 X-RAY EXAM CHEST 1 VIEW: CPT

## 2024-05-21 PROCEDURE — 81003 URINALYSIS AUTO W/O SCOPE: CPT

## 2024-05-21 PROCEDURE — 36415 COLL VENOUS BLD VENIPUNCTURE: CPT

## 2024-05-21 PROCEDURE — 83605 ASSAY OF LACTIC ACID: CPT

## 2024-05-21 PROCEDURE — 6370000000 HC RX 637 (ALT 250 FOR IP): Performed by: PHYSICIAN ASSISTANT

## 2024-05-21 PROCEDURE — 80053 COMPREHEN METABOLIC PANEL: CPT

## 2024-05-21 PROCEDURE — 99285 EMERGENCY DEPT VISIT HI MDM: CPT

## 2024-05-21 PROCEDURE — 85025 COMPLETE CBC W/AUTO DIFF WBC: CPT

## 2024-05-21 PROCEDURE — 93005 ELECTROCARDIOGRAM TRACING: CPT | Performed by: PHYSICIAN ASSISTANT

## 2024-05-21 RX ORDER — INSULIN LISPRO 100 [IU]/ML
8 INJECTION, SOLUTION INTRAVENOUS; SUBCUTANEOUS ONCE
Status: DISCONTINUED | OUTPATIENT
Start: 2024-05-21 | End: 2024-05-21

## 2024-05-21 RX ORDER — ACETAMINOPHEN 500 MG
1000 TABLET ORAL ONCE
Status: COMPLETED | OUTPATIENT
Start: 2024-05-21 | End: 2024-05-21

## 2024-05-21 RX ADMIN — ACETAMINOPHEN 1000 MG: 500 TABLET ORAL at 11:11

## 2024-05-21 ASSESSMENT — PAIN DESCRIPTION - LOCATION
LOCATION: HEAD
LOCATION: HEAD

## 2024-05-21 ASSESSMENT — PAIN - FUNCTIONAL ASSESSMENT: PAIN_FUNCTIONAL_ASSESSMENT: 0-10

## 2024-05-21 ASSESSMENT — PAIN DESCRIPTION - DESCRIPTORS
DESCRIPTORS: ACHING
DESCRIPTORS: ACHING

## 2024-05-21 ASSESSMENT — PAIN SCALES - GENERAL
PAINLEVEL_OUTOF10: 9
PAINLEVEL_OUTOF10: 9

## 2024-05-21 NOTE — ED NOTES
Pt brought to ED via EMS for Dizziness at [facility]. Pt states she feels really dizzy at times. Pt states she has hx of IBS. Pt lungs clear bilaterally.

## 2024-05-21 NOTE — ED PROVIDER NOTES
1 tablet by mouth See Admin Instructions Give 2 tabs after 1st loose stools,  then 1 tab prn.  Not to exceed 8 tabs daily    METFORMIN (GLUCOPHAGE) 500 MG TABLET    Take 1 tablet by mouth 2 times daily (with meals) Indications: Type 2 Diabetes    METOCLOPRAMIDE (REGLAN) 10 MG TABLET    Take 1 tablet by mouth 4 times daily as needed (Abdominal Pain)    METOPROLOL SUCCINATE (TOPROL XL) 25 MG EXTENDED RELEASE TABLET    Take 1 tablet by mouth daily Indications: High Blood Pressure Disorder    ONDANSETRON (ZOFRAN) 4 MG TABLET    Take 1 tablet every 6 hours by oral route.    PREGABALIN (LYRICA) 50 MG CAPSULE    Take 1 capsule by mouth 2 times daily for 180 days. Max Daily Amount: 100 mg    SEMAGLUTIDE,0.25 OR 0.5MG/DOS, (OZEMPIC, 0.25 OR 0.5 MG/DOSE,) 2 MG/1.5ML SOPN    Inject 0.25 mg into the skin once a week    VITAMIN D (CHOLECALCIFEROL) 50 MCG (2000 UT) TABS TABLET    Take 1 tablet by mouth daily Indications: Nutritional Support       ALLERGIES     Codeine    FAMILY HISTORY     History reviewed. No pertinent family history.       SOCIAL HISTORY       Social History     Socioeconomic History    Marital status:      Spouse name: None    Number of children: None    Years of education: None    Highest education level: None   Tobacco Use    Smoking status: Never    Smokeless tobacco: Never   Substance and Sexual Activity    Alcohol use: Never     Social Determinants of Health     Food Insecurity: No Food Insecurity (3/19/2024)    Hunger Vital Sign     Worried About Running Out of Food in the Last Year: Never true     Ran Out of Food in the Last Year: Never true   Transportation Needs: No Transportation Needs (3/19/2024)    PRAPARE - Transportation     Lack of Transportation (Medical): No     Lack of Transportation (Non-Medical): No   Housing Stability: Low Risk  (3/19/2024)    Housing Stability Vital Sign     Unable to Pay for Housing in the Last Year: No     Number of Places Lived in the Last Year: 1     Unstable

## 2024-05-22 LAB
EKG ATRIAL RATE: 87 BPM
EKG P AXIS: 60 DEGREES
EKG P-R INTERVAL: 188 MS
EKG Q-T INTERVAL: 384 MS
EKG QRS DURATION: 100 MS
EKG QTC CALCULATION (BAZETT): 462 MS
EKG R AXIS: -51 DEGREES
EKG T AXIS: 80 DEGREES
EKG VENTRICULAR RATE: 87 BPM

## 2024-05-23 ENCOUNTER — OFFICE VISIT (OUTPATIENT)
Dept: GERIATRIC MEDICINE | Age: 64
End: 2024-05-23
Payer: MEDICARE

## 2024-05-23 DIAGNOSIS — I10 PRIMARY HYPERTENSION: ICD-10-CM

## 2024-05-23 DIAGNOSIS — E11.65 POORLY CONTROLLED TYPE 2 DIABETES MELLITUS WITH NEUROPATHY (HCC): ICD-10-CM

## 2024-05-23 DIAGNOSIS — E11.40 POORLY CONTROLLED TYPE 2 DIABETES MELLITUS WITH NEUROPATHY (HCC): ICD-10-CM

## 2024-05-23 DIAGNOSIS — R42 DIZZINESS: Primary | ICD-10-CM

## 2024-05-23 PROCEDURE — 99348 HOME/RES VST EST LOW MDM 30: CPT | Performed by: NURSE PRACTITIONER

## 2024-05-23 PROCEDURE — 3046F HEMOGLOBIN A1C LEVEL >9.0%: CPT | Performed by: NURSE PRACTITIONER

## 2024-05-23 PROCEDURE — 3017F COLORECTAL CA SCREEN DOC REV: CPT | Performed by: NURSE PRACTITIONER

## 2024-05-23 PROCEDURE — G8417 CALC BMI ABV UP PARAM F/U: HCPCS | Performed by: NURSE PRACTITIONER

## 2024-05-23 PROCEDURE — 1036F TOBACCO NON-USER: CPT | Performed by: NURSE PRACTITIONER

## 2024-06-07 PROBLEM — G89.11 ACUTE PAIN DUE TO TRAUMA: Status: RESOLVED | Noted: 2024-03-19 | Resolved: 2024-06-07

## 2024-06-07 NOTE — PROGRESS NOTES
[place] [address]  [place], [place] [address]      Assisted living apartment    5/23/2024    Gemini Molina  is a 63 y.o. in the NF being seen for a f/u of   Chief Complaint   Patient presents with    Other     F/u ED visit        Spoke to nursing and ancillary staff regarding patient status, present state of health. Need for nursing care and assistance. Reviewed chart, labs, medications in NF chart and allergies. Aging in place at this time.     Being seen for f/u after ED visit for dizziness and had HTN   All BPs are  stable   BG elevated per nurse she is eating snacks in bed, not getting up out of room 1-2 x a day     lives in  assisted living.   Being seen for f/u ED for dizziness. Off balance with dizziness during BRP, HTN at the time.   She has not had elevated BPs noted here   Has had a fall this year for dizziness and fx ribs when fell onto her walker so she is afraid she will fall again.   W/u in ED labs diagnostics wnl for pt       Reports from nurse, pt is at baseline, no change in vision, hearing. No headaches or choking issues.  Uses walker for locomotion in facility.   But stays in bed a lot since last fall due to fearfulness.   Nursing feels pt in bed too much and is having orthostatic issues due to laying all the time.   No decrease in appetite, no change in B/B habits. No pain crises. NO fevers. Can make needs known to nurse.     Past Medical History:   Diagnosis Date    Acute cystitis with hematuria 07/10/2023    Acute pain due to trauma 03/19/2024    Agitation due to dementia ([facility]) 07/18/2023    JACOB (acute kidney injury) ([facility]) 07/10/2023    Fall 07/10/2023    Hypothyroidism 07/10/2023    Neuropathy 07/18/2023    Poorly controlled type 2 diabetes mellitus with neuropathy ([facility]) 07/10/2023    Primary hypertension 07/10/2023    Uncontrolled type 2 diabetes mellitus with hyperglycemia, with long-term current use of insulin ([facility]) 07/10/2023    Weakness 07/10/2023     No past

## 2024-06-12 ENCOUNTER — OFFICE VISIT (OUTPATIENT)
Dept: GERIATRIC MEDICINE | Age: 64
End: 2024-06-12
Payer: MEDICARE

## 2024-06-12 DIAGNOSIS — Z79.4 UNCONTROLLED TYPE 2 DIABETES MELLITUS WITH HYPERGLYCEMIA, WITH LONG-TERM CURRENT USE OF INSULIN (HCC): Primary | ICD-10-CM

## 2024-06-12 DIAGNOSIS — Z91.199 NON-COMPLIANCE: ICD-10-CM

## 2024-06-12 DIAGNOSIS — E11.65 UNCONTROLLED TYPE 2 DIABETES MELLITUS WITH HYPERGLYCEMIA, WITH LONG-TERM CURRENT USE OF INSULIN (HCC): Primary | ICD-10-CM

## 2024-06-12 PROCEDURE — 99348 HOME/RES VST EST LOW MDM 30: CPT | Performed by: NURSE PRACTITIONER

## 2024-06-12 PROCEDURE — 1036F TOBACCO NON-USER: CPT | Performed by: NURSE PRACTITIONER

## 2024-06-12 PROCEDURE — G8417 CALC BMI ABV UP PARAM F/U: HCPCS | Performed by: NURSE PRACTITIONER

## 2024-06-12 PROCEDURE — 3017F COLORECTAL CA SCREEN DOC REV: CPT | Performed by: NURSE PRACTITIONER

## 2024-06-12 PROCEDURE — 3046F HEMOGLOBIN A1C LEVEL >9.0%: CPT | Performed by: NURSE PRACTITIONER

## 2024-06-24 ASSESSMENT — ENCOUNTER SYMPTOMS
CONSTIPATION: 1
EYES NEGATIVE: 1
RESPIRATORY NEGATIVE: 1
DIARRHEA: 1
ALLERGIC/IMMUNOLOGIC NEGATIVE: 1

## 2024-06-24 NOTE — PROGRESS NOTES
metFORMIN (GLUCOPHAGE) 500 MG tablet Take 1 tablet by mouth 2 times daily (with meals) Indications: Type 2 Diabetes      metoprolol succinate (TOPROL XL) 25 MG extended release tablet Take 1 tablet by mouth daily Indications: High Blood Pressure Disorder      vitamin D (CHOLECALCIFEROL) 50 MCG (2000 UT) TABS tablet Take 1 tablet by mouth daily Indications: Nutritional Support       No current facility-administered medications on file prior to visit.          Review of Systems   Constitutional:  Positive for activity change. Negative for appetite change.   HENT: Negative.     Eyes: Negative.    Respiratory: Negative.     Cardiovascular: Negative.    Gastrointestinal:  Positive for constipation and diarrhea.        IBS  Stool incontinent   Endocrine: Positive for polyphagia and polyuria.   Genitourinary:         Incontinent   Musculoskeletal:  Positive for gait problem.   Skin: Negative.    Allergic/Immunologic: Negative.    Neurological:  Positive for weakness and numbness.        Neuropathy in feet    Hematological: Negative.    Psychiatric/Behavioral:  Positive for behavioral problems and decreased concentration.        :   VSS see PCC    Physical Exam  Vitals reviewed.   Constitutional:       General: She is not in acute distress.     Appearance: She is obese.   HENT:      Head: Normocephalic.      Nose: Nose normal.      Mouth/Throat:      Mouth: Mucous membranes are moist.   Eyes:      General: No scleral icterus.     Pupils: Pupils are equal, round, and reactive to light.   Cardiovascular:      Rate and Rhythm: Normal rate and regular rhythm.      Pulses: Normal pulses.      Heart sounds: Normal heart sounds.   Pulmonary:      Effort: Pulmonary effort is normal.      Breath sounds: Normal breath sounds. No wheezing or rhonchi.   Abdominal:      General: Bowel sounds are normal.      Palpations: Abdomen is soft.      Tenderness: There is no abdominal tenderness. There is no guarding.   Musculoskeletal:

## 2024-08-08 ENCOUNTER — OFFICE VISIT (OUTPATIENT)
Dept: GERIATRIC MEDICINE | Age: 64
End: 2024-08-08
Payer: MEDICARE

## 2024-08-08 DIAGNOSIS — Z13.31 DEPRESSION SCREEN: ICD-10-CM

## 2024-08-08 DIAGNOSIS — E11.65 UNCONTROLLED TYPE 2 DIABETES MELLITUS WITH HYPERGLYCEMIA, WITH LONG-TERM CURRENT USE OF INSULIN (HCC): ICD-10-CM

## 2024-08-08 DIAGNOSIS — Z91.199 NONCOMPLIANCE: ICD-10-CM

## 2024-08-08 DIAGNOSIS — F33.1 MODERATE EPISODE OF RECURRENT MAJOR DEPRESSIVE DISORDER (HCC): ICD-10-CM

## 2024-08-08 DIAGNOSIS — Z79.4 UNCONTROLLED TYPE 2 DIABETES MELLITUS WITH HYPERGLYCEMIA, WITH LONG-TERM CURRENT USE OF INSULIN (HCC): ICD-10-CM

## 2024-08-08 DIAGNOSIS — K58.2 IRRITABLE BOWEL SYNDROME WITH BOTH CONSTIPATION AND DIARRHEA: Primary | ICD-10-CM

## 2024-08-08 PROCEDURE — G8417 CALC BMI ABV UP PARAM F/U: HCPCS | Performed by: NURSE PRACTITIONER

## 2024-08-08 PROCEDURE — 3046F HEMOGLOBIN A1C LEVEL >9.0%: CPT | Performed by: NURSE PRACTITIONER

## 2024-08-08 PROCEDURE — 1036F TOBACCO NON-USER: CPT | Performed by: NURSE PRACTITIONER

## 2024-08-08 PROCEDURE — 3017F COLORECTAL CA SCREEN DOC REV: CPT | Performed by: NURSE PRACTITIONER

## 2024-08-08 PROCEDURE — 99348 HOME/RES VST EST LOW MDM 30: CPT | Performed by: NURSE PRACTITIONER

## 2024-08-12 DIAGNOSIS — G62.9 NEUROPATHY: ICD-10-CM

## 2024-08-13 RX ORDER — PREGABALIN 50 MG/1
50 CAPSULE ORAL 2 TIMES DAILY
Qty: 60 CAPSULE | Refills: 2 | Status: SHIPPED | OUTPATIENT
Start: 2024-08-13 | End: 2024-11-11

## 2024-08-14 ASSESSMENT — PATIENT HEALTH QUESTIONNAIRE - PHQ9
6. FEELING BAD ABOUT YOURSELF - OR THAT YOU ARE A FAILURE OR HAVE LET YOURSELF OR YOUR FAMILY DOWN: NOT AT ALL
2. FEELING DOWN, DEPRESSED OR HOPELESS: NOT AT ALL
3. TROUBLE FALLING OR STAYING ASLEEP: NOT AT ALL
7. TROUBLE CONCENTRATING ON THINGS, SUCH AS READING THE NEWSPAPER OR WATCHING TELEVISION: NOT AT ALL
9. THOUGHTS THAT YOU WOULD BE BETTER OFF DEAD, OR OF HURTING YOURSELF: NOT AT ALL
5. POOR APPETITE OR OVEREATING: NOT AT ALL
SUM OF ALL RESPONSES TO PHQ QUESTIONS 1-9: 3
8. MOVING OR SPEAKING SO SLOWLY THAT OTHER PEOPLE COULD HAVE NOTICED. OR THE OPPOSITE, BEING SO FIGETY OR RESTLESS THAT YOU HAVE BEEN MOVING AROUND A LOT MORE THAN USUAL: NOT AT ALL
10. IF YOU CHECKED OFF ANY PROBLEMS, HOW DIFFICULT HAVE THESE PROBLEMS MADE IT FOR YOU TO DO YOUR WORK, TAKE CARE OF THINGS AT HOME, OR GET ALONG WITH OTHER PEOPLE: SOMEWHAT DIFFICULT
4. FEELING TIRED OR HAVING LITTLE ENERGY: MORE THAN HALF THE DAYS
SUM OF ALL RESPONSES TO PHQ9 QUESTIONS 1 & 2: 1
SUM OF ALL RESPONSES TO PHQ QUESTIONS 1-9: 3
1. LITTLE INTEREST OR PLEASURE IN DOING THINGS: SEVERAL DAYS

## 2024-08-14 ASSESSMENT — ENCOUNTER SYMPTOMS: RESPIRATORY NEGATIVE: 1

## 2024-08-14 NOTE — PROGRESS NOTES
vitals taken for this visit.  VS per facility records    Physical Exam  Constitutional:       Appearance: She is obese.   Cardiovascular:      Rate and Rhythm: Normal rate and regular rhythm.   Pulmonary:      Effort: Pulmonary effort is normal.      Breath sounds: Normal breath sounds.   Abdominal:      General: Bowel sounds are normal.      Palpations: Abdomen is soft.   Neurological:      Mental Status: She is alert and oriented to person, place, and time.        Refer to detailed nursing notes for skin assessment       ASSESSMENT:     Diagnosis Orders   1. Irritable bowel syndrome with both constipation and diarrhea        2. Noncompliance        3. Depression screen        4. Uncontrolled type 2 diabetes mellitus with hyperglycemia, with long-term current use of insulin (HCC)        5. Moderate episode of recurrent major depressive disorder (HCC)            PLAN:  Dicyclomine 10 mg 4 times a day.  Patient has Imodium as needed for diarrhea.  Continue Lantus, Ozempic, glimepiride, metformin, Jardiance.  No hypoglycemic events reported.  Patient follows with psych.  Continue amitriptyline 5 mg at at bedtime, buspirone 5 mg twice daily      Pt/POA agrees with POC   No orders of the defined types were placed in this encounter.      adhere to the JNC VIII guidelines for HTN management and the NCEP ATP III guidelines for LDL-C management.    No orders of the defined types were placed in this encounter.      No follow-ups on file.    25\" spent on visit    Pertinent POC, medications, labs, have been reviewed, continue same.  Encourage fluids and good nutrition.  Stress fall prevention strategies.    Electronically signed by: KARINA Jin CNP on 8/8/2024    Please note orders entered on site at facility after discussion with appropriate facility nursing/therapy/ / nutritional staff. Current longstanding medical problems and acute medical issues addressed with staff. Available data and data

## 2024-09-01 ENCOUNTER — HOSPITAL ENCOUNTER (INPATIENT)
Age: 64
LOS: 2 days | Discharge: NURSING, REHAB OR LONG-TERM CARE | DRG: 872 | End: 2024-09-04
Attending: STUDENT IN AN ORGANIZED HEALTH CARE EDUCATION/TRAINING PROGRAM | Admitting: STUDENT IN AN ORGANIZED HEALTH CARE EDUCATION/TRAINING PROGRAM
Payer: MEDICARE

## 2024-09-01 DIAGNOSIS — N30.00 ACUTE CYSTITIS WITHOUT HEMATURIA: ICD-10-CM
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
## 2024-09-01 DIAGNOSIS — I95.9 HYPOTENSION, UNSPECIFIED HYPOTENSION TYPE: Primary | ICD-10-CM

## 2024-09-01 DIAGNOSIS — R55 SYNCOPE AND COLLAPSE: ICD-10-CM

## 2024-09-01 PROCEDURE — 99285 EMERGENCY DEPT VISIT HI MDM: CPT

## 2024-09-02 ENCOUNTER — APPOINTMENT (OUTPATIENT)
Dept: CT IMAGING | Age: 64
DRG: 872 | End: 2024-09-02
Payer: MEDICARE

## 2024-09-02 ENCOUNTER — APPOINTMENT (OUTPATIENT)
Dept: GENERAL RADIOLOGY | Age: 64
DRG: 872 | End: 2024-09-02
Payer: MEDICARE

## 2024-09-02 PROBLEM — A41.9 SEPSIS (HCC): Status: ACTIVE | Noted: 2024-09-02

## 2024-09-02 LAB
ALBUMIN SERPL-MCNC: 3.9 G/DL (ref 3.5–4.6)
ALP SERPL-CCNC: 88 U/L (ref 40–130)
ALT SERPL-CCNC: 23 U/L (ref 0–33)
ANION GAP SERPL CALCULATED.3IONS-SCNC: 15 MEQ/L (ref 9–15)
APTT PPP: 24.2 SEC (ref 24.4–36.8)
AST SERPL-CCNC: 36 U/L (ref 0–35)
BACTERIA URNS QL MICRO: ABNORMAL /HPF
BASOPHILS # BLD: 0 K/UL (ref 0–0.2)
BASOPHILS NFR BLD: 0.2 %
BILIRUB SERPL-MCNC: 0.3 MG/DL (ref 0.2–0.7)
BILIRUB UR QL STRIP: NEGATIVE
BUN SERPL-MCNC: 25 MG/DL (ref 8–23)
CALCIUM SERPL-MCNC: 9.5 MG/DL (ref 8.5–9.9)
CHLORIDE SERPL-SCNC: 105 MEQ/L (ref 95–107)
CLARITY UR: ABNORMAL
CO2 SERPL-SCNC: 23 MEQ/L (ref 20–31)
COLOR UR: YELLOW
CREAT SERPL-MCNC: 1.21 MG/DL (ref 0.5–0.9)
EOSINOPHIL # BLD: 0 K/UL (ref 0–0.7)
EOSINOPHIL NFR BLD: 0.2 %
EPI CELLS #/AREA URNS AUTO: ABNORMAL /HPF (ref 0–5)
ERYTHROCYTE [DISTWIDTH] IN BLOOD BY AUTOMATED COUNT: 14 % (ref 11.5–14.5)
ETHANOL PERCENT: NORMAL G/DL
ETHANOLAMINE SERPL-MCNC: <10 MG/DL (ref 0–0.08)
GLOBULIN SER CALC-MCNC: 3.5 G/DL (ref 2.3–3.5)
GLUCOSE BLD-MCNC: 115 MG/DL (ref 70–99)
GLUCOSE BLD-MCNC: 133 MG/DL (ref 70–99)
GLUCOSE BLD-MCNC: 140 MG/DL (ref 70–99)
GLUCOSE BLD-MCNC: 236 MG/DL (ref 70–99)
GLUCOSE SERPL-MCNC: 335 MG/DL (ref 70–99)
GLUCOSE UR STRIP-MCNC: >=1000 MG/DL
HCT VFR BLD AUTO: 47.8 % (ref 37–47)
HGB BLD-MCNC: 14.9 G/DL (ref 12–16)
HGB UR QL STRIP: ABNORMAL
INR PPP: 1
KETONES UR STRIP-MCNC: NEGATIVE MG/DL
LACTIC ACID, SEPSIS: 1.4 MMOL/L (ref 0.5–1.9)
LACTIC ACID, SEPSIS: 2.1 MMOL/L (ref 0.5–1.9)
LEUKOCYTE ESTERASE UR QL STRIP: ABNORMAL
LIPASE SERPL-CCNC: 30 U/L (ref 12–95)
LYMPHOCYTES # BLD: 2.1 K/UL (ref 1–4.8)
LYMPHOCYTES NFR BLD: 11.7 %
MCH RBC QN AUTO: 29.4 PG (ref 27–31.3)
MCHC RBC AUTO-ENTMCNC: 31.2 % (ref 33–37)
MCV RBC AUTO: 94.5 FL (ref 79.4–94.8)
MONOCYTES # BLD: 1.4 K/UL (ref 0.2–0.8)
MONOCYTES NFR BLD: 7.6 %
NEUTROPHILS # BLD: 14.3 K/UL (ref 1.4–6.5)
NEUTS SEG NFR BLD: 79.9 %
NITRITE UR QL STRIP: NEGATIVE
PERFORMED ON: ABNORMAL
PH UR STRIP: 5 [PH] (ref 5–9)
PLATELET # BLD AUTO: 337 K/UL (ref 130–400)
POC CREATININE WHOLE BLOOD: 1.3
POC CREATININE: 1.3 MG/DL (ref 0.6–1.2)
POC SAMPLE TYPE: ABNORMAL
POTASSIUM SERPL-SCNC: 5.8 MEQ/L (ref 3.4–4.9)
PROT SERPL-MCNC: 7.4 G/DL (ref 6.3–8)
PROT UR STRIP-MCNC: 100 MG/DL
PROTHROMBIN TIME: 12.9 SEC (ref 12.3–14.9)
RBC # BLD AUTO: 5.06 M/UL (ref 4.2–5.4)
RBC #/AREA URNS HPF: ABNORMAL /HPF (ref 0–2)
SODIUM SERPL-SCNC: 143 MEQ/L (ref 135–144)
SP GR UR STRIP: 1.04 (ref 1–1.03)
TROPONIN, HIGH SENSITIVITY: 11 NG/L (ref 0–19)
URINE REFLEX TO CULTURE: YES
UROBILINOGEN UR STRIP-ACNC: 0.2 E.U./DL
WBC # BLD AUTO: 18 K/UL (ref 4.8–10.8)
WBC #/AREA URNS AUTO: >100 /HPF (ref 0–5)
YEAST URNS QL MICRO: PRESENT /HPF

## 2024-09-02 PROCEDURE — 73590 X-RAY EXAM OF LOWER LEG: CPT

## 2024-09-02 PROCEDURE — 70450 CT HEAD/BRAIN W/O DYE: CPT

## 2024-09-02 PROCEDURE — 87086 URINE CULTURE/COLONY COUNT: CPT

## 2024-09-02 PROCEDURE — 6360000002 HC RX W HCPCS: Performed by: PHYSICIAN ASSISTANT

## 2024-09-02 PROCEDURE — 82077 ASSAY SPEC XCP UR&BREATH IA: CPT

## 2024-09-02 PROCEDURE — 1210000000 HC MED SURG R&B

## 2024-09-02 PROCEDURE — 85730 THROMBOPLASTIN TIME PARTIAL: CPT

## 2024-09-02 PROCEDURE — 51702 INSERT TEMP BLADDER CATH: CPT

## 2024-09-02 PROCEDURE — 2580000003 HC RX 258

## 2024-09-02 PROCEDURE — 72131 CT LUMBAR SPINE W/O DYE: CPT

## 2024-09-02 PROCEDURE — 6370000000 HC RX 637 (ALT 250 FOR IP): Performed by: STUDENT IN AN ORGANIZED HEALTH CARE EDUCATION/TRAINING PROGRAM

## 2024-09-02 PROCEDURE — 6360000002 HC RX W HCPCS: Performed by: STUDENT IN AN ORGANIZED HEALTH CARE EDUCATION/TRAINING PROGRAM

## 2024-09-02 PROCEDURE — 74177 CT ABD & PELVIS W/CONTRAST: CPT

## 2024-09-02 PROCEDURE — 83605 ASSAY OF LACTIC ACID: CPT

## 2024-09-02 PROCEDURE — 73130 X-RAY EXAM OF HAND: CPT

## 2024-09-02 PROCEDURE — 87186 SC STD MICRODIL/AGAR DIL: CPT

## 2024-09-02 PROCEDURE — 83690 ASSAY OF LIPASE: CPT

## 2024-09-02 PROCEDURE — 71260 CT THORAX DX C+: CPT

## 2024-09-02 PROCEDURE — 80053 COMPREHEN METABOLIC PANEL: CPT

## 2024-09-02 PROCEDURE — 73560 X-RAY EXAM OF KNEE 1 OR 2: CPT

## 2024-09-02 PROCEDURE — 87040 BLOOD CULTURE FOR BACTERIA: CPT

## 2024-09-02 PROCEDURE — 93005 ELECTROCARDIOGRAM TRACING: CPT | Performed by: PHYSICIAN ASSISTANT

## 2024-09-02 PROCEDURE — 72125 CT NECK SPINE W/O DYE: CPT

## 2024-09-02 PROCEDURE — 2580000003 HC RX 258: Performed by: PHYSICIAN ASSISTANT

## 2024-09-02 PROCEDURE — 96374 THER/PROPH/DIAG INJ IV PUSH: CPT

## 2024-09-02 PROCEDURE — 85025 COMPLETE CBC W/AUTO DIFF WBC: CPT

## 2024-09-02 PROCEDURE — 2580000003 HC RX 258: Performed by: STUDENT IN AN ORGANIZED HEALTH CARE EDUCATION/TRAINING PROGRAM

## 2024-09-02 PROCEDURE — 6370000000 HC RX 637 (ALT 250 FOR IP): Performed by: PERSONAL EMERGENCY RESPONSE ATTENDANT

## 2024-09-02 PROCEDURE — 6360000002 HC RX W HCPCS: Performed by: PERSONAL EMERGENCY RESPONSE ATTENDANT

## 2024-09-02 PROCEDURE — 85610 PROTHROMBIN TIME: CPT

## 2024-09-02 PROCEDURE — 87077 CULTURE AEROBIC IDENTIFY: CPT

## 2024-09-02 PROCEDURE — 96375 TX/PRO/DX INJ NEW DRUG ADDON: CPT

## 2024-09-02 PROCEDURE — 81001 URINALYSIS AUTO W/SCOPE: CPT

## 2024-09-02 PROCEDURE — 84484 ASSAY OF TROPONIN QUANT: CPT

## 2024-09-02 PROCEDURE — 2580000003 HC RX 258: Performed by: PERSONAL EMERGENCY RESPONSE ATTENDANT

## 2024-09-02 PROCEDURE — 6360000004 HC RX CONTRAST MEDICATION: Performed by: PHYSICIAN ASSISTANT

## 2024-09-02 RX ORDER — GLUCAGON 1 MG/ML
1 KIT INJECTION PRN
Status: DISCONTINUED | OUTPATIENT
Start: 2024-09-02 | End: 2024-09-04 | Stop reason: HOSPADM

## 2024-09-02 RX ORDER — SODIUM CHLORIDE, SODIUM LACTATE, POTASSIUM CHLORIDE, CALCIUM CHLORIDE 600; 310; 30; 20 MG/100ML; MG/100ML; MG/100ML; MG/100ML
INJECTION, SOLUTION INTRAVENOUS CONTINUOUS
Status: DISPENSED | OUTPATIENT
Start: 2024-09-02 | End: 2024-09-04

## 2024-09-02 RX ORDER — INSULIN GLARGINE 100 [IU]/ML
10 INJECTION, SOLUTION SUBCUTANEOUS EVERY MORNING
Status: DISCONTINUED | OUTPATIENT
Start: 2024-09-02 | End: 2024-09-04 | Stop reason: HOSPADM

## 2024-09-02 RX ORDER — IOPAMIDOL 755 MG/ML
75 INJECTION, SOLUTION INTRAVASCULAR
Status: COMPLETED | OUTPATIENT
Start: 2024-09-02 | End: 2024-09-02

## 2024-09-02 RX ORDER — ACETAMINOPHEN 650 MG/1
650 SUPPOSITORY RECTAL EVERY 6 HOURS PRN
Status: DISCONTINUED | OUTPATIENT
Start: 2024-09-02 | End: 2024-09-04 | Stop reason: HOSPADM

## 2024-09-02 RX ORDER — AMITRIPTYLINE HYDROCHLORIDE 10 MG/1
5 TABLET ORAL NIGHTLY
Status: DISCONTINUED | OUTPATIENT
Start: 2024-09-02 | End: 2024-09-04 | Stop reason: HOSPADM

## 2024-09-02 RX ORDER — 0.9 % SODIUM CHLORIDE 0.9 %
500 INTRAVENOUS SOLUTION INTRAVENOUS ONCE
Status: COMPLETED | OUTPATIENT
Start: 2024-09-02 | End: 2024-09-02

## 2024-09-02 RX ORDER — 0.9 % SODIUM CHLORIDE 0.9 %
1000 INTRAVENOUS SOLUTION INTRAVENOUS ONCE
Status: COMPLETED | OUTPATIENT
Start: 2024-09-02 | End: 2024-09-02

## 2024-09-02 RX ORDER — SODIUM CHLORIDE 9 MG/ML
INJECTION, SOLUTION INTRAVENOUS PRN
Status: DISCONTINUED | OUTPATIENT
Start: 2024-09-02 | End: 2024-09-04 | Stop reason: HOSPADM

## 2024-09-02 RX ORDER — SODIUM CHLORIDE 0.9 % (FLUSH) 0.9 %
5-40 SYRINGE (ML) INJECTION PRN
Status: DISCONTINUED | OUTPATIENT
Start: 2024-09-02 | End: 2024-09-04 | Stop reason: HOSPADM

## 2024-09-02 RX ORDER — SODIUM CHLORIDE 0.9 % (FLUSH) 0.9 %
5-40 SYRINGE (ML) INJECTION EVERY 12 HOURS SCHEDULED
Status: DISCONTINUED | OUTPATIENT
Start: 2024-09-02 | End: 2024-09-04 | Stop reason: HOSPADM

## 2024-09-02 RX ORDER — METOCLOPRAMIDE 5 MG/1
10 TABLET ORAL 4 TIMES DAILY PRN
Status: DISCONTINUED | OUTPATIENT
Start: 2024-09-02 | End: 2024-09-04 | Stop reason: HOSPADM

## 2024-09-02 RX ORDER — BUSPIRONE HYDROCHLORIDE 5 MG/1
5 TABLET ORAL 2 TIMES DAILY
Status: DISCONTINUED | OUTPATIENT
Start: 2024-09-02 | End: 2024-09-04 | Stop reason: HOSPADM

## 2024-09-02 RX ORDER — DICYCLOMINE HYDROCHLORIDE 10 MG/1
10 CAPSULE ORAL 4 TIMES DAILY PRN
Status: DISCONTINUED | OUTPATIENT
Start: 2024-09-02 | End: 2024-09-04 | Stop reason: HOSPADM

## 2024-09-02 RX ORDER — LOPERAMIDE HCL 2 MG
2 CAPSULE ORAL 4 TIMES DAILY PRN
Status: DISCONTINUED | OUTPATIENT
Start: 2024-09-02 | End: 2024-09-04 | Stop reason: HOSPADM

## 2024-09-02 RX ORDER — ONDANSETRON 2 MG/ML
4 INJECTION INTRAMUSCULAR; INTRAVENOUS ONCE
Status: COMPLETED | OUTPATIENT
Start: 2024-09-02 | End: 2024-09-02

## 2024-09-02 RX ORDER — INSULIN GLARGINE 100 [IU]/ML
15 INJECTION, SOLUTION SUBCUTANEOUS EVERY MORNING
Status: DISCONTINUED | OUTPATIENT
Start: 2024-09-02 | End: 2024-09-02

## 2024-09-02 RX ORDER — ENOXAPARIN SODIUM 100 MG/ML
40 INJECTION SUBCUTANEOUS DAILY
Status: DISCONTINUED | OUTPATIENT
Start: 2024-09-02 | End: 2024-09-04

## 2024-09-02 RX ORDER — DICYCLOMINE HYDROCHLORIDE 10 MG/1
10 CAPSULE ORAL
COMMUNITY

## 2024-09-02 RX ORDER — DEXTROSE MONOHYDRATE 100 MG/ML
INJECTION, SOLUTION INTRAVENOUS CONTINUOUS PRN
Status: DISCONTINUED | OUTPATIENT
Start: 2024-09-02 | End: 2024-09-04 | Stop reason: HOSPADM

## 2024-09-02 RX ORDER — PREGABALIN 50 MG/1
50 CAPSULE ORAL 2 TIMES DAILY
Status: DISCONTINUED | OUTPATIENT
Start: 2024-09-02 | End: 2024-09-04 | Stop reason: HOSPADM

## 2024-09-02 RX ORDER — INSULIN LISPRO 100 [IU]/ML
0-8 INJECTION, SOLUTION INTRAVENOUS; SUBCUTANEOUS
Status: DISCONTINUED | OUTPATIENT
Start: 2024-09-02 | End: 2024-09-04 | Stop reason: HOSPADM

## 2024-09-02 RX ORDER — FENTANYL CITRATE 50 UG/ML
50 INJECTION, SOLUTION INTRAMUSCULAR; INTRAVENOUS ONCE
Status: COMPLETED | OUTPATIENT
Start: 2024-09-02 | End: 2024-09-02

## 2024-09-02 RX ORDER — INSULIN LISPRO 100 [IU]/ML
0-4 INJECTION, SOLUTION INTRAVENOUS; SUBCUTANEOUS NIGHTLY
Status: DISCONTINUED | OUTPATIENT
Start: 2024-09-02 | End: 2024-09-04 | Stop reason: HOSPADM

## 2024-09-02 RX ORDER — VITAMIN B COMPLEX
2000 TABLET ORAL DAILY
Status: DISCONTINUED | OUTPATIENT
Start: 2024-09-02 | End: 2024-09-04 | Stop reason: HOSPADM

## 2024-09-02 RX ORDER — OXYCODONE HYDROCHLORIDE 5 MG/1
2.5 TABLET ORAL ONCE
Status: COMPLETED | OUTPATIENT
Start: 2024-09-02 | End: 2024-09-02

## 2024-09-02 RX ORDER — ATORVASTATIN CALCIUM 10 MG/1
10 TABLET, FILM COATED ORAL NIGHTLY
Status: DISCONTINUED | OUTPATIENT
Start: 2024-09-02 | End: 2024-09-04 | Stop reason: HOSPADM

## 2024-09-02 RX ORDER — ACETAMINOPHEN 325 MG/1
650 TABLET ORAL EVERY 6 HOURS PRN
Status: DISCONTINUED | OUTPATIENT
Start: 2024-09-02 | End: 2024-09-04 | Stop reason: HOSPADM

## 2024-09-02 RX ORDER — ACETAMINOPHEN 80 MG
TABLET,CHEWABLE ORAL ONCE
Status: DISCONTINUED | OUTPATIENT
Start: 2024-09-02 | End: 2024-09-04 | Stop reason: HOSPADM

## 2024-09-02 RX ORDER — CEPHALEXIN 500 MG/1
1000 CAPSULE ORAL 2 TIMES DAILY
Qty: 28 CAPSULE | Refills: 0 | Status: SHIPPED | OUTPATIENT
Start: 2024-09-02 | End: 2024-09-04 | Stop reason: HOSPADM

## 2024-09-02 RX ORDER — LEVOTHYROXINE SODIUM 75 UG/1
75 TABLET ORAL DAILY
Status: DISCONTINUED | OUTPATIENT
Start: 2024-09-02 | End: 2024-09-04 | Stop reason: HOSPADM

## 2024-09-02 RX ADMIN — OXYCODONE HYDROCHLORIDE 2.5 MG: 5 TABLET ORAL at 18:45

## 2024-09-02 RX ADMIN — PIPERACILLIN AND TAZOBACTAM 3375 MG: 3; .375 INJECTION, POWDER, LYOPHILIZED, FOR SOLUTION INTRAVENOUS at 09:31

## 2024-09-02 RX ADMIN — ENOXAPARIN SODIUM 40 MG: 100 INJECTION SUBCUTANEOUS at 09:32

## 2024-09-02 RX ADMIN — ATORVASTATIN CALCIUM 10 MG: 10 TABLET, FILM COATED ORAL at 20:45

## 2024-09-02 RX ADMIN — BUSPIRONE HYDROCHLORIDE 5 MG: 5 TABLET ORAL at 15:21

## 2024-09-02 RX ADMIN — PIPERACILLIN AND TAZOBACTAM 3375 MG: 3; .375 INJECTION, POWDER, LYOPHILIZED, FOR SOLUTION INTRAVENOUS at 15:21

## 2024-09-02 RX ADMIN — SODIUM CHLORIDE, POTASSIUM CHLORIDE, SODIUM LACTATE AND CALCIUM CHLORIDE: 600; 310; 30; 20 INJECTION, SOLUTION INTRAVENOUS at 18:44

## 2024-09-02 RX ADMIN — AMITRIPTYLINE HYDROCHLORIDE 5 MG: 10 TABLET, FILM COATED ORAL at 20:44

## 2024-09-02 RX ADMIN — ONDANSETRON 4 MG: 2 INJECTION INTRAMUSCULAR; INTRAVENOUS at 03:36

## 2024-09-02 RX ADMIN — BUSPIRONE HYDROCHLORIDE 5 MG: 5 TABLET ORAL at 20:45

## 2024-09-02 RX ADMIN — SODIUM CHLORIDE, PRESERVATIVE FREE 10 ML: 5 INJECTION INTRAVENOUS at 20:45

## 2024-09-02 RX ADMIN — Medication 2000 UNITS: at 15:21

## 2024-09-02 RX ADMIN — INSULIN HUMAN 4 UNITS: 100 INJECTION, SOLUTION PARENTERAL at 03:35

## 2024-09-02 RX ADMIN — SODIUM CHLORIDE, POTASSIUM CHLORIDE, SODIUM LACTATE AND CALCIUM CHLORIDE: 600; 310; 30; 20 INJECTION, SOLUTION INTRAVENOUS at 09:30

## 2024-09-02 RX ADMIN — SODIUM CHLORIDE 1000 ML: 9 INJECTION, SOLUTION INTRAVENOUS at 07:59

## 2024-09-02 RX ADMIN — PREGABALIN 50 MG: 50 CAPSULE ORAL at 20:45

## 2024-09-02 RX ADMIN — PREGABALIN 50 MG: 50 CAPSULE ORAL at 15:21

## 2024-09-02 RX ADMIN — INSULIN GLARGINE 10 UNITS: 100 INJECTION, SOLUTION SUBCUTANEOUS at 15:21

## 2024-09-02 RX ADMIN — SODIUM CHLORIDE 1000 ML: 9 INJECTION, SOLUTION INTRAVENOUS at 07:28

## 2024-09-02 RX ADMIN — CEFTRIAXONE SODIUM 1000 MG: 1 INJECTION, POWDER, FOR SOLUTION INTRAMUSCULAR; INTRAVENOUS at 05:45

## 2024-09-02 RX ADMIN — FENTANYL CITRATE 50 MCG: 50 INJECTION, SOLUTION INTRAMUSCULAR; INTRAVENOUS at 03:36

## 2024-09-02 RX ADMIN — PIPERACILLIN AND TAZOBACTAM 3375 MG: 3; .375 INJECTION, POWDER, LYOPHILIZED, FOR SOLUTION INTRAVENOUS at 23:16

## 2024-09-02 RX ADMIN — LEVOTHYROXINE SODIUM 75 MCG: 0.07 TABLET ORAL at 15:21

## 2024-09-02 RX ADMIN — SODIUM CHLORIDE 500 ML: 9 INJECTION, SOLUTION INTRAVENOUS at 00:31

## 2024-09-02 RX ADMIN — DICYCLOMINE HYDROCHLORIDE 10 MG: 10 CAPSULE ORAL at 20:45

## 2024-09-02 RX ADMIN — IOPAMIDOL 75 ML: 755 INJECTION, SOLUTION INTRAVENOUS at 02:29

## 2024-09-02 ASSESSMENT — ENCOUNTER SYMPTOMS
VOICE CHANGE: 0
ANAL BLEEDING: 0
ABDOMINAL PAIN: 1
NAUSEA: 0
VOMITING: 0
COUGH: 0
ABDOMINAL DISTENTION: 0
PHOTOPHOBIA: 0
APNEA: 0
BACK PAIN: 1

## 2024-09-02 ASSESSMENT — PAIN SCALES - GENERAL
PAINLEVEL_OUTOF10: 3
PAINLEVEL_OUTOF10: 9
PAINLEVEL_OUTOF10: 9

## 2024-09-02 ASSESSMENT — PAIN - FUNCTIONAL ASSESSMENT: PAIN_FUNCTIONAL_ASSESSMENT: 0-10

## 2024-09-02 ASSESSMENT — LIFESTYLE VARIABLES
HOW MANY STANDARD DRINKS CONTAINING ALCOHOL DO YOU HAVE ON A TYPICAL DAY: PATIENT DOES NOT DRINK
HOW MANY STANDARD DRINKS CONTAINING ALCOHOL DO YOU HAVE ON A TYPICAL DAY: PATIENT DOES NOT DRINK
HOW OFTEN DO YOU HAVE A DRINK CONTAINING ALCOHOL: NEVER
HOW MANY STANDARD DRINKS CONTAINING ALCOHOL DO YOU HAVE ON A TYPICAL DAY: PATIENT DOES NOT DRINK
HOW OFTEN DO YOU HAVE A DRINK CONTAINING ALCOHOL: NEVER
HOW OFTEN DO YOU HAVE A DRINK CONTAINING ALCOHOL: NEVER

## 2024-09-02 NOTE — ED NOTES
Terrazas inserted by Bia HAYES for urine sample and retention. 500 yellow cloudy sediment urine out immediately. Patient's vaginal area is red, excoriated, painful, warm.

## 2024-09-02 NOTE — PLAN OF CARE
Problem: Skin/Tissue Integrity  Goal: Absence of new skin breakdown  Description: 1.  Monitor for areas of redness and/or skin breakdown  2.  Assess vascular access sites hourly  3.  Every 4-6 hours minimum:  Change oxygen saturation probe site  4.  Every 4-6 hours:  If on nasal continuous positive airway pressure, respiratory therapy assess nares and determine need for appliance change or resting period.  Outcome: Progressing     Problem: Discharge Planning  Goal: Discharge to home or other facility with appropriate resources  Outcome: Progressing  Flowsheets (Taken 9/2/2024 1136)  Discharge to home or other facility with appropriate resources:   Identify barriers to discharge with patient and caregiver   Identify discharge learning needs (meds, wound care, etc)   Refer to discharge planning if patient needs post-hospital services based on physician order or complex needs related to functional status, cognitive ability or social support system   Arrange for needed discharge resources and transportation as appropriate     Problem: Pain  Goal: Verbalizes/displays adequate comfort level or baseline comfort level  Outcome: Progressing     Problem: Safety - Adult  Goal: Free from fall injury  Outcome: Progressing

## 2024-09-02 NOTE — ED PROVIDER NOTES
[facility] ED  EMERGENCY DEPARTMENT ENCOUNTER      Pt Name: Gemini Molina  MRN: [id number]  Birthdate 1960  Date of evaluation: 9/1/2024  Provider: EMERY GONZALEZ  1:16 AM EDT    CHIEF COMPLAINT     No chief complaint on file.        HISTORY OF PRESENT ILLNESS   (Location/Symptom, Timing/Onset, Context/Setting, Quality, Duration, Modifying Factors, Severity)  Note limiting factors.   Gemini Molina is a 63 y.o. female who presents to the emergency department patient presents with fall states she lost consciousness headache neck pain belly pain back pain knee pain.  Patient states she has history of rib fractures and a prior fall earlier this year.  Denies any blood thinners.  States she has IBS symptoms moderate severity worse with touch or motion.  Nothing improves her symptoms.    Patient from [facility], full code. Patient states she took her evening medications and was sleepy.  She was walking to the bathroom without her walker, felt lightheaded, and had a syncopal episode.  Hit her head on the door frame, no blood thinner use.  Stood herself up afterwards.  She does have IBS and had 3 episodes of emesis earlier in the day and 4-5 episodes of diarrhea.  She complains of headaches, neck pain, back pain, shortness of breath, lower abdominal pain but had this prior to the fall, left thigh pain and bilateral knee pain.  She denies fevers, chest pain, urinary symptoms.      HPI    Nursing Notes were reviewed.    REVIEW OF SYSTEMS    (2-9 systems for level 4, 10 or more for level 5)     Review of Systems   Constitutional:  Negative for fever.   HENT:  Negative for ear discharge, nosebleeds and voice change.    Eyes:  Negative for photophobia and visual disturbance.   Respiratory:  Negative for apnea and cough.    Cardiovascular:  Negative for chest pain.   Gastrointestinal:  Positive for abdominal pain. Negative for abdominal distention, anal bleeding, nausea and vomiting.

## 2024-09-02 NOTE — ED NOTES
Provider notified of pt being hypotension. Pt is A&Ox4, following commands but c/o weakness. Pt states she has been falling frequently with dizziness.

## 2024-09-02 NOTE — H&P
DEPARTMENT OF HOSPITAL MEDICINE    HISTORY AND PHYSICAL EXAM    PATIENT NAME:  Gemini Molina    MRN:  [id number]  SERVICE DATE:  9/2/2024   SERVICE TIME:  1:20 PM    Primary Care Physician: Jhonaatn Donovan MD     SUBJECTIVE  CHIEF COMPLAINT:  No chief complaint on file.       HPI      Gemini Molina is a 63 y.o., female with PMH IBS, hypothyroidism, T2DM, HTN, neuropathy who  presents to the emergency department with chief complaint of weakness/fall.     Patient says over the last few weeks she has had a significant worsening of her IBS symptoms including nausea, vomiting, and diarrhea. She is having ~3 loose Bms/day and at baseline will have 1 BM/day or 1 BM every 2 days. She denies any significant abdominal pain or dysuria. She said that she has been having multiple orthostatic syncopal events since these symptoms started. PO Intake has been been poor over this time period. She denies any fevers/chills. In ED she was found to be significantly hypotensive but did respond to 2.5 L of IVF boluses in ED. At time of my evaluation she denied any dizziness or chest pain but did note suprapubic tenderness to palpation.     The primary encounter diagnosis was Hypotension, unspecified hypotension type. Diagnoses of Fall, initial encounter, Acute cystitis without hematuria, and Syncope and collapse were also pertinent to this visit.      PAST MEDICAL HISTORY:    Past Medical History:   Diagnosis Date    Acute cystitis with hematuria 07/10/2023    Acute pain due to trauma 03/19/2024    Agitation due to dementia ([facility]) 07/18/2023    JACOB (acute kidney injury) ([facility]) 07/10/2023    Fall 07/10/2023    Hypothyroidism 07/10/2023    IBS (irritable bowel syndrome)     Neuropathy 07/18/2023    Poorly controlled type 2 diabetes mellitus with neuropathy ([facility]) 07/10/2023    Primary hypertension 07/10/2023    Uncontrolled type 2 diabetes mellitus with hyperglycemia, with long-term current use of insulin ([facility]) 07/10/2023    Weakness

## 2024-09-02 NOTE — ED PROVIDER NOTES
Initial assessment, work-up completed by Bony Tran PA-C. Received patient in sign-out at 0600 from Makeda Hubbard PA-C pending patient's disposition back to NH.     During RN handoff of care at 0730, it was brought to my attention that patient has been hypotensive for the last several hours and Jolie White (RN) do not feel that patient is safe for disposition back to independent living. At this time patient is noted with a BP of 76/26.  Patient was given 500 mL NS bolus prior to my arrival and orders for additional 1 L IV NS bolus placed at this time. Following 2.5 L IV NS, manual /70.  Spoke with patient and patient states that she in fact did have a syncopal episode last night which brought her to the emergency department.  Patient states that she has been having multiple syncopal episodes over the last several weeks and states that she has noted increasing weakness as well.  Patient states that she does live in the independent living portion of [facility] and patient states that she doesn't feel safe to return there at this time 2/2 weakness. Orders for lactic acid, blood cultures x2. Discussed admission with patient at this time, given hypotension, UTI, weakness, recurrent syncope and patient is agreeable to admission. Call placed to medicine. Spoke with Dr. Hong (medicine) whom is agreeable to admission. Patient admitted to medicine in stable condition.      Ania Lopez, FELY  09/02/24 9822

## 2024-09-02 NOTE — ED NOTES
Patient had soiled themself was cleaned up blood found in vaginal area of diaper provider made aware.  Attempted to cath patient for urine per provider request was not successful

## 2024-09-03 LAB
ANION GAP SERPL CALCULATED.3IONS-SCNC: 9 MEQ/L (ref 9–15)
BASOPHILS # BLD: 0 K/UL (ref 0–0.2)
BASOPHILS NFR BLD: 0.2 %
BUN SERPL-MCNC: 13 MG/DL (ref 8–23)
CALCIUM SERPL-MCNC: 8.3 MG/DL (ref 8.5–9.9)
CHLORIDE SERPL-SCNC: 108 MEQ/L (ref 95–107)
CO2 SERPL-SCNC: 23 MEQ/L (ref 20–31)
CREAT SERPL-MCNC: 0.82 MG/DL (ref 0.5–0.9)
EKG ATRIAL RATE: 84 BPM
EKG P AXIS: 50 DEGREES
EKG P-R INTERVAL: 180 MS
EKG Q-T INTERVAL: 376 MS
EKG QRS DURATION: 92 MS
EKG QTC CALCULATION (BAZETT): 444 MS
EKG R AXIS: -45 DEGREES
EKG T AXIS: 46 DEGREES
EKG VENTRICULAR RATE: 84 BPM
EOSINOPHIL # BLD: 0.2 K/UL (ref 0–0.7)
EOSINOPHIL NFR BLD: 1.9 %
ERYTHROCYTE [DISTWIDTH] IN BLOOD BY AUTOMATED COUNT: 14 % (ref 11.5–14.5)
GLUCOSE BLD-MCNC: 158 MG/DL (ref 70–99)
GLUCOSE BLD-MCNC: 184 MG/DL (ref 70–99)
GLUCOSE BLD-MCNC: 223 MG/DL (ref 70–99)
GLUCOSE BLD-MCNC: 87 MG/DL (ref 70–99)
GLUCOSE SERPL-MCNC: 84 MG/DL (ref 70–99)
HCT VFR BLD AUTO: 35.7 % (ref 37–47)
HGB BLD-MCNC: 11.9 G/DL (ref 12–16)
LYMPHOCYTES # BLD: 2.9 K/UL (ref 1–4.8)
LYMPHOCYTES NFR BLD: 27.5 %
MCH RBC QN AUTO: 30.6 PG (ref 27–31.3)
MCHC RBC AUTO-ENTMCNC: 33.3 % (ref 33–37)
MCV RBC AUTO: 91.8 FL (ref 79.4–94.8)
MONOCYTES # BLD: 0.9 K/UL (ref 0.2–0.8)
MONOCYTES NFR BLD: 8.5 %
NEUTROPHILS # BLD: 6.4 K/UL (ref 1.4–6.5)
NEUTS SEG NFR BLD: 61.7 %
PERFORMED ON: ABNORMAL
PERFORMED ON: NORMAL
PLATELET # BLD AUTO: 212 K/UL (ref 130–400)
POTASSIUM SERPL-SCNC: 3.8 MEQ/L (ref 3.4–4.9)
RBC # BLD AUTO: 3.89 M/UL (ref 4.2–5.4)
SODIUM SERPL-SCNC: 140 MEQ/L (ref 135–144)
WBC # BLD AUTO: 10.4 K/UL (ref 4.8–10.8)

## 2024-09-03 PROCEDURE — 6360000002 HC RX W HCPCS: Performed by: STUDENT IN AN ORGANIZED HEALTH CARE EDUCATION/TRAINING PROGRAM

## 2024-09-03 PROCEDURE — 6370000000 HC RX 637 (ALT 250 FOR IP): Performed by: STUDENT IN AN ORGANIZED HEALTH CARE EDUCATION/TRAINING PROGRAM

## 2024-09-03 PROCEDURE — 2580000003 HC RX 258: Performed by: STUDENT IN AN ORGANIZED HEALTH CARE EDUCATION/TRAINING PROGRAM

## 2024-09-03 PROCEDURE — 36415 COLL VENOUS BLD VENIPUNCTURE: CPT

## 2024-09-03 PROCEDURE — 87040 BLOOD CULTURE FOR BACTERIA: CPT

## 2024-09-03 PROCEDURE — 93010 ELECTROCARDIOGRAM REPORT: CPT | Performed by: INTERNAL MEDICINE

## 2024-09-03 PROCEDURE — 1210000000 HC MED SURG R&B

## 2024-09-03 PROCEDURE — 97162 PT EVAL MOD COMPLEX 30 MIN: CPT

## 2024-09-03 PROCEDURE — 80048 BASIC METABOLIC PNL TOTAL CA: CPT

## 2024-09-03 PROCEDURE — 85025 COMPLETE CBC W/AUTO DIFF WBC: CPT

## 2024-09-03 RX ORDER — OXYCODONE HYDROCHLORIDE 5 MG/1
2.5 TABLET ORAL ONCE
Status: COMPLETED | OUTPATIENT
Start: 2024-09-03 | End: 2024-09-03

## 2024-09-03 RX ADMIN — AMITRIPTYLINE HYDROCHLORIDE 5 MG: 10 TABLET, FILM COATED ORAL at 22:20

## 2024-09-03 RX ADMIN — ACETAMINOPHEN 325MG 650 MG: 325 TABLET ORAL at 19:33

## 2024-09-03 RX ADMIN — PREGABALIN 50 MG: 50 CAPSULE ORAL at 22:20

## 2024-09-03 RX ADMIN — SODIUM CHLORIDE, POTASSIUM CHLORIDE, SODIUM LACTATE AND CALCIUM CHLORIDE: 600; 310; 30; 20 INJECTION, SOLUTION INTRAVENOUS at 03:57

## 2024-09-03 RX ADMIN — INSULIN LISPRO 2 UNITS: 100 INJECTION, SOLUTION INTRAVENOUS; SUBCUTANEOUS at 12:03

## 2024-09-03 RX ADMIN — INSULIN LISPRO 2 UNITS: 100 INJECTION, SOLUTION INTRAVENOUS; SUBCUTANEOUS at 17:04

## 2024-09-03 RX ADMIN — ACETAMINOPHEN 325MG 650 MG: 325 TABLET ORAL at 03:51

## 2024-09-03 RX ADMIN — SODIUM CHLORIDE, POTASSIUM CHLORIDE, SODIUM LACTATE AND CALCIUM CHLORIDE: 600; 310; 30; 20 INJECTION, SOLUTION INTRAVENOUS at 19:31

## 2024-09-03 RX ADMIN — ENOXAPARIN SODIUM 40 MG: 100 INJECTION SUBCUTANEOUS at 09:17

## 2024-09-03 RX ADMIN — BUSPIRONE HYDROCHLORIDE 5 MG: 5 TABLET ORAL at 09:17

## 2024-09-03 RX ADMIN — PIPERACILLIN AND TAZOBACTAM 3375 MG: 3; .375 INJECTION, POWDER, LYOPHILIZED, FOR SOLUTION INTRAVENOUS at 17:06

## 2024-09-03 RX ADMIN — PREGABALIN 50 MG: 50 CAPSULE ORAL at 09:17

## 2024-09-03 RX ADMIN — ATORVASTATIN CALCIUM 10 MG: 10 TABLET, FILM COATED ORAL at 22:20

## 2024-09-03 RX ADMIN — BUSPIRONE HYDROCHLORIDE 5 MG: 5 TABLET ORAL at 22:20

## 2024-09-03 RX ADMIN — LEVOTHYROXINE SODIUM 75 MCG: 0.07 TABLET ORAL at 05:55

## 2024-09-03 RX ADMIN — PIPERACILLIN AND TAZOBACTAM 3375 MG: 3; .375 INJECTION, POWDER, LYOPHILIZED, FOR SOLUTION INTRAVENOUS at 09:23

## 2024-09-03 RX ADMIN — OXYCODONE HYDROCHLORIDE 2.5 MG: 5 TABLET ORAL at 09:17

## 2024-09-03 RX ADMIN — Medication 2000 UNITS: at 09:17

## 2024-09-03 RX ADMIN — INSULIN GLARGINE 10 UNITS: 100 INJECTION, SOLUTION SUBCUTANEOUS at 09:17

## 2024-09-03 ASSESSMENT — PAIN SCALES - GENERAL
PAINLEVEL_OUTOF10: 5
PAINLEVEL_OUTOF10: 8
PAINLEVEL_OUTOF10: 9
PAINLEVEL_OUTOF10: 7
PAINLEVEL_OUTOF10: 9
PAINLEVEL_OUTOF10: 7
PAINLEVEL_OUTOF10: 6

## 2024-09-03 ASSESSMENT — PAIN SCALES - WONG BAKER: WONGBAKER_NUMERICALRESPONSE: NO HURT

## 2024-09-03 NOTE — PROGRESS NOTES
tender without mass   Pulmonary/Chest: clear to auscultation bilaterally- no wheezes, rales or rhonchi, normal air movement, no respiratory distress  Cardiovascular: normal rate, normal S1 and S2 and no carotid bruits  Abdomen: soft, suprapubic TTP , non-distended, normal bowel sounds, no masses or organomegaly  Extremities: no cyanosis, no clubbing and no edema  Neurologic: no cranial nerve deficit and speech normal        Recent Labs     09/02/24  0038 09/02/24  0040 09/03/24  0552     --  140   K 5.8*  --  3.8     --  108*   CO2 23  --  23   BUN 25*  --  13   CREATININE 1.21* 1.3* 0.82   GLUCOSE 335*  --  84   CALCIUM 9.5  --  8.3*       Recent Labs     09/02/24 0038 09/03/24  0552   WBC 18.0* 10.4   RBC 5.06 3.89*   HGB 14.9 11.9*   HCT 47.8* 35.7*   MCV 94.5 91.8   MCH 29.4 30.6   MCHC 31.2* 33.3   RDW 14.0 14.0    212       Radiology:   CT LUMBAR SPINE WO CONTRAST   Final Result   No acute posttraumatic abnormality is identified.      5 mm right solid pulmonary nodule.  Advise follow-up chest CT in 12 months if   there are risk factors for malignancy.      Thickened appearance of the sigmoid colon.  Correlate for colitis.      Mild bladder wall thickening. Correlation with UA suggested to exclude   cystitis.      Additional observations as above.         XR HAND RIGHT (MIN 3 VIEWS)   Final Result   1. Mild degenerative changes of the interphalangeal joints.   2. Mild osteopenia with no evidence of acute fracture or subluxation.         XR KNEE RIGHT (1-2 VIEWS)   Final Result   1. Moderate tricompartmental osteoarthritic changes bilaterally.   2. No evidence of acute fracture or subluxation.         XR KNEE LEFT (1-2 VIEWS)   Final Result   1. Moderate tricompartmental osteoarthritic changes bilaterally.   2. No evidence of acute fracture or subluxation.         XR TIBIA FIBULA RIGHT (2 VIEWS)   Final Result   1. No evidence of fracture or subluxation.   2. Venous calcifications.         CT  on 9/3/2024 at 12:56 PM

## 2024-09-03 NOTE — PLAN OF CARE
Problem: Skin/Tissue Integrity  Goal: Absence of new skin breakdown  Description: 1.  Monitor for areas of redness and/or skin breakdown  2.  Assess vascular access sites hourly  3.  Every 4-6 hours minimum:  Change oxygen saturation probe site  4.  Every 4-6 hours:  If on nasal continuous positive airway pressure, respiratory therapy assess nares and determine need for appliance change or resting period.  9/3/2024 1043 by Ursula Larios RN  Outcome: Progressing  9/3/2024 0012 by Preeti Van RN  Outcome: Progressing     Problem: Discharge Planning  Goal: Discharge to home or other facility with appropriate resources  9/3/2024 1043 by Ursula Larios RN  Outcome: Progressing  9/3/2024 0012 by Preeti Van RN  Outcome: Progressing     Problem: Pain  Goal: Verbalizes/displays adequate comfort level or baseline comfort level  9/3/2024 1043 by Ursula Larios RN  Outcome: Progressing  9/3/2024 0012 by Preeti Van RN  Outcome: Progressing     Problem: Safety - Adult  Goal: Free from fall injury  9/3/2024 1043 by Ursula Larios RN  Outcome: Progressing  9/3/2024 0012 by Preeti Van RN  Outcome: Progressing     Problem: Chronic Conditions and Co-morbidities  Goal: Patient's chronic conditions and co-morbidity symptoms are monitored and maintained or improved  Outcome: Progressing

## 2024-09-03 NOTE — CARE COORDINATION
Patient is from [facility]. Patient adamantly refuses to be transferred to [facility] upon discharge.  Patient plans to return to her Assisted Living apt.  SEBASTIAN/DARLIN to follow.  Electronically signed by SEBASTIAN Ramos on 9/3/24 at 10:43 AM EDT

## 2024-09-03 NOTE — CARE COORDINATION
Case Management Assessment  Initial Evaluation    Date/Time of Evaluation: 9/3/2024 10:37 AM  Assessment Completed by: SEBASTIAN Ramos    If patient is discharged prior to next notation, then this note serves as note for discharge by case management.    Patient Name: Gemini Molina                   YOB: 1960  Diagnosis: Syncope and collapse [R55]  Acute cystitis without hematuria [N30.00]  Fall, initial encounter [W19.XXXA]  Sepsis (HCC) [A41.9]  Hypotension, unspecified hypotension type [I95.9]                   Date / Time: 9/1/2024 11:59 PM    Patient Admission Status: Inpatient   Readmission Risk (Low < 19, Mod (19-27), High > 27): Readmission Risk Score: 12.1    Current PCP: Jhonatan Donovan MD  PCP verified by CM? Yes    Chart Reviewed: Yes      History Provided by: Patient  Patient Orientation: Alert and Oriented    Patient Cognition: Alert    Hospitalization in the last 30 days (Readmission):  No    If yes, Readmission Assessment in  Navigator will be completed.    Advance Directives:      Code Status: Full Code   Patient's Primary Decision Maker is:      Primary Decision Maker: Ryan Molina - [place] - [phone]    Discharge Planning:    Patient lives with: Other (Comment) Type of Home: Assisted living  Primary Care Giver: Self  Patient Support Systems include: Children   Current Financial resources:    Current community resources:    Current services prior to admission: None            Current DME:              Type of Home Care services:  None    ADLS  Prior functional level: Independent in ADLs/IADLs, Bathing, Dressing, Toileting  Current functional level: Assistance with the following:, Bathing, Dressing, Toileting    PT AM-PAC:   /24  OT AM-PAC:   /24    Family can provide assistance at DC: No  Would you like Case Management to discuss the discharge plan with any other family members/significant others, and if so, who? No  Plans to Return to Present Housing: Yes  Other

## 2024-09-03 NOTE — PLAN OF CARE
Problem: Skin/Tissue Integrity  Goal: Absence of new skin breakdown  Description: 1.  Monitor for areas of redness and/or skin breakdown  2.  Assess vascular access sites hourly  3.  Every 4-6 hours minimum:  Change oxygen saturation probe site  4.  Every 4-6 hours:  If on nasal continuous positive airway pressure, respiratory therapy assess nares and determine need for appliance change or resting period.  9/3/2024 0012 by Preeti Van, RN  Outcome: Progressing  9/2/2024 1138 by Ursula Larios, RN  Outcome: Progressing     Problem: Discharge Planning  Goal: Discharge to home or other facility with appropriate resources  9/3/2024 0012 by Preeti Van RN  Outcome: Progressing  9/2/2024 1138 by Ursula Larios, RN  Outcome: Progressing  Flowsheets (Taken 9/2/2024 1136)  Discharge to home or other facility with appropriate resources:   Identify barriers to discharge with patient and caregiver   Identify discharge learning needs (meds, wound care, etc)   Refer to discharge planning if patient needs post-hospital services based on physician order or complex needs related to functional status, cognitive ability or social support system   Arrange for needed discharge resources and transportation as appropriate     Problem: Pain  Goal: Verbalizes/displays adequate comfort level or baseline comfort level  9/3/2024 0012 by Preeti Van, RN  Outcome: Progressing  9/2/2024 1138 by Ursula Larios, RN  Outcome: Progressing     Problem: Safety - Adult  Goal: Free from fall injury  9/3/2024 0012 by Preeti Van, RN  Outcome: Progressing  9/2/2024 1138 by Ursula Larios, RN  Outcome: Progressing

## 2024-09-03 NOTE — PROGRESS NOTES
Physical Therapy Med Surg Initial Assessment  Facility/Department: [address] MED SURG UNIT  Room: [facility] [address]       NAME: Gemini Molina  : 1960 (63 y.o.)  MRN: [id number]  CODE STATUS: Full Code    Date of Service: 9/3/2024    Patient Diagnosis(es): Syncope and collapse [R55]  Acute cystitis without hematuria [N30.00]  Fall, initial encounter [W19.XXXA]  Sepsis (HCC) [A41.9]  Hypotension, unspecified hypotension type [I95.9]   No chief complaint on file.    Patient Active Problem List    Diagnosis Date Noted    Sepsis ([facility]) 2024    Multiple fractures of ribs, right side, initial encounter for closed fracture 2024    Neuropathy 2023    Agitation due to dementia ([facility]) 2023    Hypothyroidism 07/10/2023    Primary hypertension 07/10/2023    JACOB (acute kidney injury) ([facility]) 07/10/2023    Weakness 07/10/2023    Acute cystitis with hematuria 07/10/2023    Uncontrolled type 2 diabetes mellitus with hyperglycemia, with long-term current use of insulin ([facility]) 07/10/2023    Poorly controlled type 2 diabetes mellitus with neuropathy ([facility]) 07/10/2023        Past Medical History:   Diagnosis Date    Acute cystitis with hematuria 07/10/2023    Acute pain due to trauma 2024    Agitation due to dementia ([facility]) 2023    JACOB (acute kidney injury) ([facility]) 07/10/2023    Fall 07/10/2023    Hypothyroidism 07/10/2023    IBS (irritable bowel syndrome)     Neuropathy 2023    Poorly controlled type 2 diabetes mellitus with neuropathy ([facility]) 07/10/2023    Primary hypertension 07/10/2023    Uncontrolled type 2 diabetes mellitus with hyperglycemia, with long-term current use of insulin ([facility]) 07/10/2023    Weakness 07/10/2023     History reviewed. No pertinent surgical history.    Patient assessed for rehabilitation services?: Yes  Family / Caregiver Present: No    Restrictions:  Restrictions/Precautions: Contact Precautions, Fall Risk     SUBJECTIVE:   Pain   8/10 B thighs, L shoulder, neck- pt

## 2024-09-04 VITALS
SYSTOLIC BLOOD PRESSURE: 127 MMHG | BODY MASS INDEX: 38.39 KG/M2 | HEIGHT: 67 IN | TEMPERATURE: 98.8 F | HEART RATE: 72 BPM | RESPIRATION RATE: 16 BRPM | OXYGEN SATURATION: 99 % | WEIGHT: 244.6 LBS | DIASTOLIC BLOOD PRESSURE: 66 MMHG

## 2024-09-04 LAB
ANION GAP SERPL CALCULATED.3IONS-SCNC: 6 MEQ/L (ref 9–15)
BACTERIA UR CULT: ABNORMAL
BACTERIA UR CULT: ABNORMAL
BASOPHILS # BLD: 0 K/UL (ref 0–0.2)
BASOPHILS NFR BLD: 0.3 %
BUN SERPL-MCNC: 10 MG/DL (ref 8–23)
CALCIUM SERPL-MCNC: 7.4 MG/DL (ref 8.5–9.9)
CHLORIDE SERPL-SCNC: 107 MEQ/L (ref 95–107)
CO2 SERPL-SCNC: 25 MEQ/L (ref 20–31)
CREAT SERPL-MCNC: 0.74 MG/DL (ref 0.5–0.9)
EOSINOPHIL # BLD: 0.2 K/UL (ref 0–0.7)
EOSINOPHIL NFR BLD: 1.9 %
ERYTHROCYTE [DISTWIDTH] IN BLOOD BY AUTOMATED COUNT: 13.8 % (ref 11.5–14.5)
GLUCOSE BLD-MCNC: 129 MG/DL (ref 70–99)
GLUCOSE BLD-MCNC: 191 MG/DL (ref 70–99)
GLUCOSE SERPL-MCNC: 138 MG/DL (ref 70–99)
HCT VFR BLD AUTO: 31.4 % (ref 37–47)
HGB BLD-MCNC: 10.4 G/DL (ref 12–16)
LYMPHOCYTES # BLD: 3.4 K/UL (ref 1–4.8)
LYMPHOCYTES NFR BLD: 37.8 %
MCH RBC QN AUTO: 30.1 PG (ref 27–31.3)
MCHC RBC AUTO-ENTMCNC: 33.1 % (ref 33–37)
MCV RBC AUTO: 91 FL (ref 79.4–94.8)
MONOCYTES # BLD: 0.6 K/UL (ref 0.2–0.8)
MONOCYTES NFR BLD: 7.2 %
NEUTROPHILS # BLD: 4.7 K/UL (ref 1.4–6.5)
NEUTS SEG NFR BLD: 52.7 %
ORGANISM: ABNORMAL
PERFORMED ON: ABNORMAL
PERFORMED ON: ABNORMAL
PLATELET # BLD AUTO: 198 K/UL (ref 130–400)
POTASSIUM SERPL-SCNC: 3.7 MEQ/L (ref 3.4–4.9)
RBC # BLD AUTO: 3.45 M/UL (ref 4.2–5.4)
SODIUM SERPL-SCNC: 138 MEQ/L (ref 135–144)
WBC # BLD AUTO: 8.9 K/UL (ref 4.8–10.8)

## 2024-09-04 PROCEDURE — 6360000002 HC RX W HCPCS: Performed by: STUDENT IN AN ORGANIZED HEALTH CARE EDUCATION/TRAINING PROGRAM

## 2024-09-04 PROCEDURE — 85025 COMPLETE CBC W/AUTO DIFF WBC: CPT

## 2024-09-04 PROCEDURE — 80048 BASIC METABOLIC PNL TOTAL CA: CPT

## 2024-09-04 PROCEDURE — 6360000002 HC RX W HCPCS: Performed by: NURSE PRACTITIONER

## 2024-09-04 PROCEDURE — 2580000003 HC RX 258: Performed by: STUDENT IN AN ORGANIZED HEALTH CARE EDUCATION/TRAINING PROGRAM

## 2024-09-04 PROCEDURE — 97116 GAIT TRAINING THERAPY: CPT

## 2024-09-04 PROCEDURE — 36415 COLL VENOUS BLD VENIPUNCTURE: CPT

## 2024-09-04 PROCEDURE — 97535 SELF CARE MNGMENT TRAINING: CPT

## 2024-09-04 PROCEDURE — 6370000000 HC RX 637 (ALT 250 FOR IP): Performed by: STUDENT IN AN ORGANIZED HEALTH CARE EDUCATION/TRAINING PROGRAM

## 2024-09-04 RX ORDER — CEPHALEXIN 500 MG/1
500 CAPSULE ORAL EVERY 12 HOURS SCHEDULED
Status: DISCONTINUED | OUTPATIENT
Start: 2024-09-04 | End: 2024-09-04 | Stop reason: HOSPADM

## 2024-09-04 RX ORDER — ENOXAPARIN SODIUM 100 MG/ML
30 INJECTION SUBCUTANEOUS 2 TIMES DAILY
Status: DISCONTINUED | OUTPATIENT
Start: 2024-09-04 | End: 2024-09-04 | Stop reason: HOSPADM

## 2024-09-04 RX ORDER — KETOROLAC TROMETHAMINE 15 MG/ML
15 INJECTION, SOLUTION INTRAMUSCULAR; INTRAVENOUS EVERY 6 HOURS PRN
Status: DISCONTINUED | OUTPATIENT
Start: 2024-09-04 | End: 2024-09-04 | Stop reason: HOSPADM

## 2024-09-04 RX ORDER — ONDANSETRON 2 MG/ML
4 INJECTION INTRAMUSCULAR; INTRAVENOUS EVERY 8 HOURS PRN
Status: DISCONTINUED | OUTPATIENT
Start: 2024-09-04 | End: 2024-09-04 | Stop reason: HOSPADM

## 2024-09-04 RX ORDER — ONDANSETRON 4 MG/1
4 TABLET, ORALLY DISINTEGRATING ORAL EVERY 8 HOURS PRN
Status: DISCONTINUED | OUTPATIENT
Start: 2024-09-04 | End: 2024-09-04 | Stop reason: HOSPADM

## 2024-09-04 RX ORDER — CEPHALEXIN 500 MG/1
500 CAPSULE ORAL EVERY 12 HOURS SCHEDULED
DISCHARGE
Start: 2024-09-04 | End: 2024-09-07

## 2024-09-04 RX ADMIN — ONDANSETRON 4 MG: 2 INJECTION INTRAMUSCULAR; INTRAVENOUS at 09:30

## 2024-09-04 RX ADMIN — ENOXAPARIN SODIUM 40 MG: 100 INJECTION SUBCUTANEOUS at 09:31

## 2024-09-04 RX ADMIN — INSULIN GLARGINE 10 UNITS: 100 INJECTION, SOLUTION SUBCUTANEOUS at 09:31

## 2024-09-04 RX ADMIN — KETOROLAC TROMETHAMINE 15 MG: 15 INJECTION, SOLUTION INTRAMUSCULAR; INTRAVENOUS at 09:30

## 2024-09-04 RX ADMIN — Medication 2000 UNITS: at 09:33

## 2024-09-04 RX ADMIN — DICYCLOMINE HYDROCHLORIDE 10 MG: 10 CAPSULE ORAL at 11:37

## 2024-09-04 RX ADMIN — SODIUM CHLORIDE, PRESERVATIVE FREE 10 ML: 5 INJECTION INTRAVENOUS at 09:31

## 2024-09-04 RX ADMIN — PREGABALIN 50 MG: 50 CAPSULE ORAL at 09:30

## 2024-09-04 RX ADMIN — BUSPIRONE HYDROCHLORIDE 5 MG: 5 TABLET ORAL at 09:30

## 2024-09-04 RX ADMIN — PIPERACILLIN AND TAZOBACTAM 3375 MG: 3; .375 INJECTION, POWDER, LYOPHILIZED, FOR SOLUTION INTRAVENOUS at 02:01

## 2024-09-04 RX ADMIN — KETOROLAC TROMETHAMINE 15 MG: 15 INJECTION, SOLUTION INTRAMUSCULAR; INTRAVENOUS at 01:10

## 2024-09-04 RX ADMIN — LEVOTHYROXINE SODIUM 75 MCG: 0.07 TABLET ORAL at 05:49

## 2024-09-04 RX ADMIN — SODIUM CHLORIDE, POTASSIUM CHLORIDE, SODIUM LACTATE AND CALCIUM CHLORIDE: 600; 310; 30; 20 INJECTION, SOLUTION INTRAVENOUS at 01:17

## 2024-09-04 RX ADMIN — ONDANSETRON 4 MG: 2 INJECTION INTRAMUSCULAR; INTRAVENOUS at 01:11

## 2024-09-04 RX ADMIN — PIPERACILLIN AND TAZOBACTAM 3375 MG: 3; .375 INJECTION, POWDER, LYOPHILIZED, FOR SOLUTION INTRAVENOUS at 09:42

## 2024-09-04 RX ADMIN — SODIUM CHLORIDE, POTASSIUM CHLORIDE, SODIUM LACTATE AND CALCIUM CHLORIDE: 600; 310; 30; 20 INJECTION, SOLUTION INTRAVENOUS at 07:32

## 2024-09-04 ASSESSMENT — PAIN DESCRIPTION - ORIENTATION: ORIENTATION: RIGHT;LEFT;MID

## 2024-09-04 ASSESSMENT — PAIN DESCRIPTION - LOCATION
LOCATION: HEAD
LOCATION: HEAD

## 2024-09-04 ASSESSMENT — PAIN - FUNCTIONAL ASSESSMENT: PAIN_FUNCTIONAL_ASSESSMENT: ACTIVITIES ARE NOT PREVENTED

## 2024-09-04 ASSESSMENT — PAIN DESCRIPTION - DESCRIPTORS: DESCRIPTORS: ACHING

## 2024-09-04 ASSESSMENT — PAIN SCALES - GENERAL
PAINLEVEL_OUTOF10: 9
PAINLEVEL_OUTOF10: 7
PAINLEVEL_OUTOF10: 8
PAINLEVEL_OUTOF10: 7

## 2024-09-04 ASSESSMENT — PAIN SCALES - WONG BAKER: WONGBAKER_NUMERICALRESPONSE: NO HURT

## 2024-09-04 NOTE — PROGRESS NOTES
Pharmacy Dose Adjustment Per Protocol    Gemini Molina is a 63 y.o. female.     Recent Labs     09/02/24  0038 09/02/24  0040 09/04/24  0536   CREATININE 1.21*   < > 0.74      < > 198   HGB 14.9   < > 10.4*   INR 1.0  --   --     < > = values in this interval not displayed.   Estimated Creatinine Clearance: 99 mL/min (based on SCr of 0.74 mg/dL).  .    Height: 170.2 cm (5' 7\"), Weight - Scale: 110.9 kg (244 lb 9.6 oz)    Current order:  Enoxaparin 40 mg SUBQ once daily      Plan:  Pharmacologic VTE prophylaxis modified based on patient weight per [facility]/P&T approved protocol     Patient Weight (kg)      50.9 and below .9 101-150.9 151-174.9 175 or greater   Estimated   CrCl  (ml/min) 30 or greater []   30 mg   SUBQ daily   []   40 mg   SUBQ daily (or 30 mg BID for orthopedic cases) [x]  30 mg SUBQ   BID*  []  40 mg   SUBQ   BID []  60mg SUBQ BID    15-29.9 []  UFH 5000   units SUBQ BID []  30 mg   SUBQ daily [] 30 mg SUBQ   daily []  40 mg SUBQ   daily [] 60 mg SUBQ   Daily*    Less than 15 or dialysis []  UFH 5000   units SUBQ BID [] UFH 5000 units SUBQ TID []  UFH 7500   units   SUBQ TID*   *Do not exceed enoxaparin 40mg daily or UFH 5000 units SUBQ TID in patients with epidurals,   lumbar drains, or external ventricular drains      Thank you,   Rayna Dubose, PharmD

## 2024-09-04 NOTE — CARE COORDINATION
This LSW met with patient at bedside this am. Patient and I discussed discharge plans. Patient is planning on returning to her Assisted Living [facility]pt[facility].  Electronically signed by SEBASTIAN Ramos on 9/4/24 at 10:43 AM EDT    MD has given discharge orders,patient to be transported back to her Assisted Living [facility] via [facility].  Electronically signed by SEBASTIAN Ramos on 9/4/24 at 1:50 PM EDT

## 2024-09-04 NOTE — DISCHARGE INSTR - DIET

## 2024-09-04 NOTE — DISCHARGE SUMMARY
evidence of acute fracture or subluxation. SOFT TISSUES: There is no prevertebral soft tissue swelling.     No acute abnormality of the cervical spine.     CT Head W/O Contrast    Result Date: 9/2/2024  EXAMINATION: CT OF THE HEAD WITHOUT CONTRAST  9/2/2024 2:14 am TECHNIQUE: CT of the head was performed without the administration of intravenous contrast. Automated exposure control, iterative reconstruction, and/or weight based adjustment of the mA/kV was utilized to reduce the radiation dose to as low as reasonably achievable. COMPARISON: 05/21/2024 HISTORY: ORDERING SYSTEM PROVIDED HISTORY: fall with loc and head injury TECHNOLOGIST PROVIDED HISTORY: Reason for exam:->fall with loc and head injury Has a \"code stroke\" or \"stroke alert\" been called?->No Decision Support Exception - unselect if not a suspected or confirmed emergency medical condition->Emergency Medical Condition (MA) What reading provider will be dictating this exam?->CRC FINDINGS: BRAIN/VENTRICLES: There is no acute intracranial hemorrhage, mass effect or midline shift. No abnormal extra-axial fluid collection.  The gray-white differentiation is maintained without evidence of an acute infarct. There is prominence of the ventricles and sulci due to global parenchymal volume loss. There are nonspecific areas of hypoattenuation within the periventricular and subcortical white matter, which likely represent chronic microvascular ischemic change. ORBITS: The visualized portion of the orbits demonstrate no acute abnormality. SINUSES: The visualized paranasal sinuses and mastoid air cells demonstrate no acute abnormality. SOFT TISSUES/SKULL: No acute abnormality of the visualized skull or soft tissues.     No acute intracranial abnormality.       Discharge Medications:         Medication List        START taking these medications      cephALEXin 500 MG capsule  Commonly known as: KEFLEX  Take 1 capsule by mouth every 12 hours for 5 doses         time was spent focused exclusively on this patient. Time was taken to review chart, discuss plans with consultants, reconciling medications, discussing plan answering questions with patient.     Signed:  Magan Hong MD  9/4/2024, 3:54 PM  ----------------------------------------------------------------------------------------------------------------------    Gemini Molina,

## 2024-09-04 NOTE — PLAN OF CARE
Problem: Skin/Tissue Integrity  Goal: Absence of new skin breakdown  Description: 1.  Monitor for areas of redness and/or skin breakdown  2.  Assess vascular access sites hourly  3.  Every 4-6 hours minimum:  Change oxygen saturation probe site  4.  Every 4-6 hours:  If on nasal continuous positive airway pressure, respiratory therapy assess nares and determine need for appliance change or resting period.  Outcome: Progressing     Problem: Discharge Planning  Goal: Discharge to home or other facility with appropriate resources  Outcome: Progressing  Flowsheets (Taken 9/3/2024 2222 by Poonam Saavedra, RN)  Discharge to home or other facility with appropriate resources: Identify barriers to discharge with patient and caregiver     Problem: Pain  Goal: Verbalizes/displays adequate comfort level or baseline comfort level  Outcome: Progressing     Problem: Safety - Adult  Goal: Free from fall injury  Outcome: Progressing     Problem: Chronic Conditions and Co-morbidities  Goal: Patient's chronic conditions and co-morbidity symptoms are monitored and maintained or improved  Outcome: Progressing  Flowsheets (Taken 9/3/2024 2222 by Poonam Saavedra, RN)  Care Plan - Patient's Chronic Conditions and Co-Morbidity Symptoms are Monitored and Maintained or Improved: Monitor and assess patient's chronic conditions and comorbid symptoms for stability, deterioration, or improvement

## 2024-09-04 NOTE — DISCHARGE INSTR - COC
Continuity of Care Form    Patient Name: Gemini Molina   :  1960  MRN:  [id number]    Admit date:  2024  Discharge date:  24    Code Status Order: Full Code   Advance Directives:   Advance Care Flowsheet Documentation             Admitting Physician:  Magan Hong MD  PCP: Jhonatan Donovan MD    Discharging Nurse: Irene Lees RN   Discharging Hospital Unit/Room#: W471/W471-01  Discharging Unit Phone Number: [phone]    Emergency Contact:   Extended Emergency Contact Information  Primary Emergency Contact: Ryan Molina  Home Phone: [phone]  Relation: Child    Past Surgical History:  History reviewed. No pertinent surgical history.    Immunization History:   Immunization History   Administered Date(s) Administered    COVID-19, PFIZER Bivalent, DO NOT Dilute, (age 12y+), IM, 30 mcg/0.3 mL 2022    COVID-19, PFIZER GRAY top, DO NOT Dilute, (age 12 y+), IM, 30 mcg/0.3 mL 2022    COVID-19, PFIZER PURPLE top, DILUTE for use, (age 12 y+), 30mcg/0.3mL 2021, 2021, 10/28/2021    TDaP, ADACEL (age 10y-64y), BOOSTRIX (age 10y+), IM, 0.5mL 2024       Active Problems:  Patient Active Problem List   Diagnosis Code    Hypothyroidism E03.9    Primary hypertension I10    JACOB (acute kidney injury) ([facility]) N17.9    Weakness R53.1    Acute cystitis with hematuria N30.01    Uncontrolled type 2 diabetes mellitus with hyperglycemia, with long-term current use of insulin ([facility]) E11.65, Z79.4    Poorly controlled type 2 diabetes mellitus with neuropathy ([facility]) E11.40, E11.65    Neuropathy G62.9    Agitation due to dementia ([facility]) F03.911    Multiple fractures of ribs, right side, initial encounter for closed fracture S22.41XA    Sepsis ([facility]) A41.9       Isolation/Infection:   Isolation            No Isolation          Patient Infection Status       Infection Onset Added Last Indicated Last Indicated By Review Planned Expiration Resolved Resolved By    C-diff Rule Out 24

## 2024-09-04 NOTE — PROGRESS NOTES
Physical Therapy Med Surg Daily Treatment Note  Facility/Department: [address] MED SURG UNIT  Room: [place] [address]       NAME: Gemini Molina  : 1960 (63 y.o.)  MRN: [id number]  CODE STATUS: Full Code    Date of Service: 2024    Patient Diagnosis(es): Syncope and collapse [R55]  Acute cystitis without hematuria [N30.00]  Fall, initial encounter [W19.XXXA]  Sepsis (HCC) [A41.9]  Hypotension, unspecified hypotension type [I95.9]   No chief complaint on file.    Patient Active Problem List    Diagnosis Date Noted    Sepsis ([facility]) 2024    Multiple fractures of ribs, right side, initial encounter for closed fracture 2024    Neuropathy 2023    Agitation due to dementia ([facility]) 2023    Hypothyroidism 07/10/2023    Primary hypertension 07/10/2023    JACOB (acute kidney injury) ([facility]) 07/10/2023    Weakness 07/10/2023    Acute cystitis with hematuria 07/10/2023    Uncontrolled type 2 diabetes mellitus with hyperglycemia, with long-term current use of insulin ([facility]) 07/10/2023    Poorly controlled type 2 diabetes mellitus with neuropathy ([facility]) 07/10/2023        Past Medical History:   Diagnosis Date    Acute cystitis with hematuria 07/10/2023    Acute pain due to trauma 2024    Agitation due to dementia ([facility]) 2023    JACOB (acute kidney injury) ([facility]) 07/10/2023    Fall 07/10/2023    Hypothyroidism 07/10/2023    IBS (irritable bowel syndrome)     Neuropathy 2023    Poorly controlled type 2 diabetes mellitus with neuropathy ([facility]) 07/10/2023    Primary hypertension 07/10/2023    Uncontrolled type 2 diabetes mellitus with hyperglycemia, with long-term current use of insulin ([facility]) 07/10/2023    Weakness 07/10/2023     History reviewed. No pertinent surgical history.    Restrictions  Restrictions/Precautions: Contact Precautions;Fall Risk    SUBJECTIVE   General  Chart Reviewed: Yes  Family / Caregiver Present: No  Subjective  Subjective: \"The person I seen yesterday did me wrong. She

## 2024-09-06 NOTE — PROGRESS NOTES
Physical Therapy  Facility/Department: [facility] MED SURG W471/W471-01  Physical Therapy Discharge      NAME: Gemini Molina    : 1960 (63 y.o.)  MRN: [id number]    Account: [id number]  Gender: female      Patient has been discharged from [facility] hospital. DC patient from current PT program.      Electronically signed by Nicky Pizarro PT on 24 at 3:58 PM EDT

## 2024-09-07 LAB — BACTERIA BLD CULT: NORMAL

## 2024-09-08 LAB — BACTERIA BLD CULT ORG #2: NORMAL

## 2024-09-20 LAB
BILIRUBIN, URINE: NEGATIVE
BLOOD, URINE: POSITIVE
CLARITY, UA: ABNORMAL
COLOR, UA: ABNORMAL
GLUCOSE URINE: ABNORMAL
KETONES, URINE: POSITIVE
LEUKOCYTE ESTERASE, URINE: ABNORMAL
NITRITE, URINE: NEGATIVE
PH UA: 5 (ref 4.5–8)
PROTEIN UA: POSITIVE
SPECIFIC GRAVITY UA: 1.03 (ref 1–1.03)
UROBILINOGEN, URINE: NORMAL

## 2024-10-01 ENCOUNTER — APPOINTMENT (OUTPATIENT)
Dept: GENERAL RADIOLOGY | Age: 64
DRG: 638 | End: 2024-10-01
Payer: MEDICARE

## 2024-10-01 ENCOUNTER — APPOINTMENT (OUTPATIENT)
Dept: CT IMAGING | Age: 64
DRG: 638 | End: 2024-10-01
Payer: MEDICARE

## 2024-10-01 ENCOUNTER — HOSPITAL ENCOUNTER (INPATIENT)
Age: 64
LOS: 3 days | Discharge: NURSING, REHAB OR LONG-TERM CARE | DRG: 638 | End: 2024-10-04
Admitting: INTERNAL MEDICINE
Payer: MEDICARE

## 2024-10-01 DIAGNOSIS — E13.10 DIABETIC KETOACIDOSIS WITHOUT COMA ASSOCIATED WITH OTHER SPECIFIED DIABETES MELLITUS (HCC): ICD-10-CM

## 2024-10-01 DIAGNOSIS — N30.00 ACUTE CYSTITIS WITHOUT HEMATURIA: ICD-10-CM

## 2024-10-01 DIAGNOSIS — E87.20 LACTIC ACIDOSIS: Primary | ICD-10-CM

## 2024-10-01 LAB
ALBUMIN SERPL-MCNC: 3.7 G/DL (ref 3.5–4.6)
ALP SERPL-CCNC: 87 U/L (ref 40–130)
ALT SERPL-CCNC: 12 U/L (ref 0–33)
ANION GAP SERPL CALCULATED.3IONS-SCNC: 20 MEQ/L (ref 9–15)
AST SERPL-CCNC: 12 U/L (ref 0–35)
B-OH-BUTYR SERPL-SCNC: 3.1 MG/DL (ref 0.2–2.8)
BACTERIA URNS QL MICRO: ABNORMAL /HPF
BASE EXCESS VENOUS: -7 (ref -3–3)
BASOPHILS # BLD: 0 K/UL (ref 0–0.2)
BASOPHILS NFR BLD: 0.4 %
BILIRUB SERPL-MCNC: 0.3 MG/DL (ref 0.2–0.7)
BILIRUB UR QL STRIP: NEGATIVE
BNP BLD-MCNC: 111 PG/ML
BUN SERPL-MCNC: 24 MG/DL (ref 8–23)
CALCIUM IONIZED: 1.12 MMOL/L (ref 1.12–1.32)
CALCIUM SERPL-MCNC: 8.8 MG/DL (ref 8.5–9.9)
CHLORIDE SERPL-SCNC: 101 MEQ/L (ref 95–107)
CLARITY UR: ABNORMAL
CO2 SERPL-SCNC: 19 MEQ/L (ref 20–31)
COLOR UR: YELLOW
CREAT SERPL-MCNC: 1.12 MG/DL (ref 0.5–0.9)
EOSINOPHIL # BLD: 0.2 K/UL (ref 0–0.7)
EOSINOPHIL NFR BLD: 2.1 %
EPI CELLS #/AREA URNS AUTO: ABNORMAL /HPF (ref 0–5)
ERYTHROCYTE [DISTWIDTH] IN BLOOD BY AUTOMATED COUNT: 13.7 % (ref 11.5–14.5)
GLOBULIN SER CALC-MCNC: 2.9 G/DL (ref 2.3–3.5)
GLUCOSE BLD-MCNC: 111 MG/DL (ref 70–99)
GLUCOSE BLD-MCNC: 115 MG/DL (ref 70–99)
GLUCOSE BLD-MCNC: 411 MG/DL (ref 70–99)
GLUCOSE SERPL-MCNC: 375 MG/DL (ref 70–99)
GLUCOSE UR STRIP-MCNC: >=1000 MG/DL
HCO3 VENOUS: 19.2 MMOL/L (ref 23–29)
HCT VFR BLD AUTO: 43.2 % (ref 37–47)
HCT VFR BLD AUTO: 48 % (ref 36–48)
HGB BLD CALC-MCNC: 16.2 GM/DL (ref 12–16)
HGB BLD-MCNC: 14.1 G/DL (ref 12–16)
HGB UR QL STRIP: ABNORMAL
HYALINE CASTS #/AREA URNS AUTO: ABNORMAL /HPF (ref 0–5)
KETONES UR STRIP-MCNC: NEGATIVE MG/DL
LACTATE BLDV-SCNC: 1.6 MMOL/L (ref 0.5–2.2)
LACTATE BLDV-SCNC: 9.7 MMOL/L (ref 0.5–2.2)
LACTATE: 9.59 MMOL/L (ref 0.4–2)
LEUKOCYTE ESTERASE UR QL STRIP: ABNORMAL
LIPASE SERPL-CCNC: 13 U/L (ref 12–95)
LYMPHOCYTES # BLD: 3.6 K/UL (ref 1–4.8)
LYMPHOCYTES NFR BLD: 33.3 %
MCH RBC QN AUTO: 30.3 PG (ref 27–31.3)
MCHC RBC AUTO-ENTMCNC: 32.6 % (ref 33–37)
MCV RBC AUTO: 92.9 FL (ref 79.4–94.8)
MONOCYTES # BLD: 0.8 K/UL (ref 0.2–0.8)
MONOCYTES NFR BLD: 7 %
NEUTROPHILS # BLD: 6.1 K/UL (ref 1.4–6.5)
NEUTS SEG NFR BLD: 56.7 %
NITRITE UR QL STRIP: NEGATIVE
O2 SAT, VEN: 72 %
PCO2 VENOUS: 39.6 MM HG (ref 40–50)
PERFORMED ON: ABNORMAL
PH UR STRIP: 5 [PH] (ref 5–9)
PH VENOUS: 7.29 (ref 7.32–7.42)
PLATELET # BLD AUTO: 369 K/UL (ref 130–400)
PO2 VENOUS: 42 MM HG
POC CHLORIDE: 104 MEQ/L (ref 99–110)
POC CREATININE: 1 MG/DL (ref 0.6–1.2)
POC SAMPLE TYPE: ABNORMAL
POTASSIUM SERPL-SCNC: 3.8 MEQ/L (ref 3.5–5.1)
POTASSIUM SERPL-SCNC: 4.1 MEQ/L (ref 3.4–4.9)
PROT SERPL-MCNC: 6.6 G/DL (ref 6.3–8)
PROT UR STRIP-MCNC: ABNORMAL MG/DL
RBC # BLD AUTO: 4.65 M/UL (ref 4.2–5.4)
RBC #/AREA URNS HPF: ABNORMAL /HPF (ref 0–2)
SARS-COV-2 RDRP RESP QL NAA+PROBE: NOT DETECTED
SODIUM BLD-SCNC: 141 MEQ/L (ref 136–145)
SODIUM SERPL-SCNC: 140 MEQ/L (ref 135–144)
SP GR UR STRIP: 1.03 (ref 1–1.03)
TCO2 CALC VENOUS: 20 MMOL/L
TROPONIN, HIGH SENSITIVITY: 11 NG/L (ref 0–19)
TSH REFLEX: 4.13 UIU/ML (ref 0.44–3.86)
URINE REFLEX TO CULTURE: YES
UROBILINOGEN UR STRIP-ACNC: 0.2 E.U./DL
WBC # BLD AUTO: 10.7 K/UL (ref 4.8–10.8)
WBC #/AREA URNS AUTO: >100 /HPF (ref 0–5)
YEAST URNS QL MICRO: PRESENT /HPF

## 2024-10-01 PROCEDURE — 96374 THER/PROPH/DIAG INJ IV PUSH: CPT

## 2024-10-01 PROCEDURE — 84484 ASSAY OF TROPONIN QUANT: CPT

## 2024-10-01 PROCEDURE — 83605 ASSAY OF LACTIC ACID: CPT

## 2024-10-01 PROCEDURE — 85025 COMPLETE CBC W/AUTO DIFF WBC: CPT

## 2024-10-01 PROCEDURE — 36415 COLL VENOUS BLD VENIPUNCTURE: CPT

## 2024-10-01 PROCEDURE — G0378 HOSPITAL OBSERVATION PER HR: HCPCS

## 2024-10-01 PROCEDURE — 2580000003 HC RX 258: Performed by: INTERNAL MEDICINE

## 2024-10-01 PROCEDURE — 80053 COMPREHEN METABOLIC PANEL: CPT

## 2024-10-01 PROCEDURE — 96375 TX/PRO/DX INJ NEW DRUG ADDON: CPT

## 2024-10-01 PROCEDURE — 84295 ASSAY OF SERUM SODIUM: CPT

## 2024-10-01 PROCEDURE — 74174 CTA ABD&PLVS W/CONTRAST: CPT

## 2024-10-01 PROCEDURE — 96361 HYDRATE IV INFUSION ADD-ON: CPT

## 2024-10-01 PROCEDURE — 70450 CT HEAD/BRAIN W/O DYE: CPT

## 2024-10-01 PROCEDURE — 83690 ASSAY OF LIPASE: CPT

## 2024-10-01 PROCEDURE — 6360000002 HC RX W HCPCS

## 2024-10-01 PROCEDURE — 6370000000 HC RX 637 (ALT 250 FOR IP): Performed by: INTERNAL MEDICINE

## 2024-10-01 PROCEDURE — 84443 ASSAY THYROID STIM HORMONE: CPT

## 2024-10-01 PROCEDURE — 2580000003 HC RX 258

## 2024-10-01 PROCEDURE — 84132 ASSAY OF SERUM POTASSIUM: CPT

## 2024-10-01 PROCEDURE — 85014 HEMATOCRIT: CPT

## 2024-10-01 PROCEDURE — 87086 URINE CULTURE/COLONY COUNT: CPT

## 2024-10-01 PROCEDURE — 82803 BLOOD GASES ANY COMBINATION: CPT

## 2024-10-01 PROCEDURE — 87040 BLOOD CULTURE FOR BACTERIA: CPT

## 2024-10-01 PROCEDURE — 82330 ASSAY OF CALCIUM: CPT

## 2024-10-01 PROCEDURE — 1210000000 HC MED SURG R&B

## 2024-10-01 PROCEDURE — 82010 KETONE BODYS QUAN: CPT

## 2024-10-01 PROCEDURE — 96365 THER/PROPH/DIAG IV INF INIT: CPT

## 2024-10-01 PROCEDURE — 6360000002 HC RX W HCPCS: Performed by: INTERNAL MEDICINE

## 2024-10-01 PROCEDURE — 71045 X-RAY EXAM CHEST 1 VIEW: CPT

## 2024-10-01 PROCEDURE — 82565 ASSAY OF CREATININE: CPT

## 2024-10-01 PROCEDURE — 82435 ASSAY OF BLOOD CHLORIDE: CPT

## 2024-10-01 PROCEDURE — 99285 EMERGENCY DEPT VISIT HI MDM: CPT

## 2024-10-01 PROCEDURE — 81003 URINALYSIS AUTO W/O SCOPE: CPT

## 2024-10-01 PROCEDURE — 87635 SARS-COV-2 COVID-19 AMP PRB: CPT

## 2024-10-01 PROCEDURE — 83880 ASSAY OF NATRIURETIC PEPTIDE: CPT

## 2024-10-01 PROCEDURE — 93005 ELECTROCARDIOGRAM TRACING: CPT

## 2024-10-01 PROCEDURE — 36600 WITHDRAWAL OF ARTERIAL BLOOD: CPT

## 2024-10-01 PROCEDURE — 6360000004 HC RX CONTRAST MEDICATION

## 2024-10-01 PROCEDURE — 96376 TX/PRO/DX INJ SAME DRUG ADON: CPT

## 2024-10-01 RX ORDER — INSULIN LISPRO 100 [IU]/ML
INJECTION, SOLUTION INTRAVENOUS; SUBCUTANEOUS
COMMUNITY
Start: 2024-08-04

## 2024-10-01 RX ORDER — SODIUM CHLORIDE 0.9 % (FLUSH) 0.9 %
5-40 SYRINGE (ML) INJECTION PRN
Status: DISCONTINUED | OUTPATIENT
Start: 2024-10-01 | End: 2024-10-04 | Stop reason: HOSPADM

## 2024-10-01 RX ORDER — ACETAMINOPHEN 650 MG/1
650 SUPPOSITORY RECTAL EVERY 6 HOURS PRN
Status: DISCONTINUED | OUTPATIENT
Start: 2024-10-01 | End: 2024-10-04 | Stop reason: HOSPADM

## 2024-10-01 RX ORDER — INSULIN LISPRO 100 [IU]/ML
0-4 INJECTION, SOLUTION INTRAVENOUS; SUBCUTANEOUS NIGHTLY
Status: DISCONTINUED | OUTPATIENT
Start: 2024-10-01 | End: 2024-10-04 | Stop reason: HOSPADM

## 2024-10-01 RX ORDER — IOPAMIDOL 755 MG/ML
100 INJECTION, SOLUTION INTRAVASCULAR
Status: COMPLETED | OUTPATIENT
Start: 2024-10-01 | End: 2024-10-01

## 2024-10-01 RX ORDER — 0.9 % SODIUM CHLORIDE 0.9 %
15 INTRAVENOUS SOLUTION INTRAVENOUS ONCE
Status: COMPLETED | OUTPATIENT
Start: 2024-10-01 | End: 2024-10-01

## 2024-10-01 RX ORDER — ACETAMINOPHEN 325 MG/1
650 TABLET ORAL EVERY 6 HOURS PRN
Status: DISCONTINUED | OUTPATIENT
Start: 2024-10-01 | End: 2024-10-04 | Stop reason: HOSPADM

## 2024-10-01 RX ORDER — GLUCAGON 1 MG/ML
1 KIT INJECTION PRN
Status: DISCONTINUED | OUTPATIENT
Start: 2024-10-01 | End: 2024-10-04 | Stop reason: HOSPADM

## 2024-10-01 RX ORDER — SODIUM CHLORIDE 9 MG/ML
INJECTION, SOLUTION INTRAVENOUS PRN
Status: DISCONTINUED | OUTPATIENT
Start: 2024-10-01 | End: 2024-10-04 | Stop reason: HOSPADM

## 2024-10-01 RX ORDER — INSULIN GLARGINE 100 [IU]/ML
0.25 INJECTION, SOLUTION SUBCUTANEOUS NIGHTLY
Status: DISCONTINUED | OUTPATIENT
Start: 2024-10-02 | End: 2024-10-02

## 2024-10-01 RX ORDER — 0.9 % SODIUM CHLORIDE 0.9 %
1000 INTRAVENOUS SOLUTION INTRAVENOUS ONCE
Status: COMPLETED | OUTPATIENT
Start: 2024-10-01 | End: 2024-10-01

## 2024-10-01 RX ORDER — ENOXAPARIN SODIUM 100 MG/ML
40 INJECTION SUBCUTANEOUS DAILY
Status: DISCONTINUED | OUTPATIENT
Start: 2024-10-02 | End: 2024-10-04 | Stop reason: HOSPADM

## 2024-10-01 RX ORDER — INSULIN LISPRO 100 [IU]/ML
0-8 INJECTION, SOLUTION INTRAVENOUS; SUBCUTANEOUS
Status: DISCONTINUED | OUTPATIENT
Start: 2024-10-02 | End: 2024-10-04 | Stop reason: HOSPADM

## 2024-10-01 RX ORDER — INSULIN LISPRO 100 [IU]/ML
0.08 INJECTION, SOLUTION INTRAVENOUS; SUBCUTANEOUS
Status: DISCONTINUED | OUTPATIENT
Start: 2024-10-02 | End: 2024-10-02

## 2024-10-01 RX ORDER — SODIUM CHLORIDE 9 MG/ML
INJECTION, SOLUTION INTRAVENOUS CONTINUOUS
Status: DISPENSED | OUTPATIENT
Start: 2024-10-01 | End: 2024-10-02

## 2024-10-01 RX ORDER — SODIUM CHLORIDE 0.9 % (FLUSH) 0.9 %
5-40 SYRINGE (ML) INJECTION EVERY 12 HOURS SCHEDULED
Status: DISCONTINUED | OUTPATIENT
Start: 2024-10-01 | End: 2024-10-04 | Stop reason: HOSPADM

## 2024-10-01 RX ORDER — 0.9 % SODIUM CHLORIDE 0.9 %
500 INTRAVENOUS SOLUTION INTRAVENOUS ONCE
Status: COMPLETED | OUTPATIENT
Start: 2024-10-01 | End: 2024-10-01

## 2024-10-01 RX ORDER — DEXTROSE MONOHYDRATE 100 MG/ML
INJECTION, SOLUTION INTRAVENOUS CONTINUOUS PRN
Status: DISCONTINUED | OUTPATIENT
Start: 2024-10-01 | End: 2024-10-04 | Stop reason: HOSPADM

## 2024-10-01 RX ADMIN — SODIUM CHLORIDE 1470 ML: 9 INJECTION, SOLUTION INTRAVENOUS at 19:43

## 2024-10-01 RX ADMIN — SODIUM CHLORIDE 1000 ML: 9 INJECTION, SOLUTION INTRAVENOUS at 18:02

## 2024-10-01 RX ADMIN — CEFTRIAXONE SODIUM 1000 MG: 1 INJECTION, POWDER, FOR SOLUTION INTRAMUSCULAR; INTRAVENOUS at 18:05

## 2024-10-01 RX ADMIN — SODIUM CHLORIDE, PRESERVATIVE FREE 10 ML: 5 INJECTION INTRAVENOUS at 22:03

## 2024-10-01 RX ADMIN — INSULIN HUMAN 8 UNITS: 100 INJECTION, SOLUTION PARENTERAL at 19:54

## 2024-10-01 RX ADMIN — SODIUM CHLORIDE: 9 INJECTION, SOLUTION INTRAVENOUS at 21:57

## 2024-10-01 RX ADMIN — IOPAMIDOL 100 ML: 755 INJECTION, SOLUTION INTRAVENOUS at 19:02

## 2024-10-01 RX ADMIN — PIPERACILLIN AND TAZOBACTAM 4500 MG: 4; .5 INJECTION, POWDER, LYOPHILIZED, FOR SOLUTION INTRAVENOUS at 21:58

## 2024-10-01 RX ADMIN — SODIUM CHLORIDE 500 ML: 9 INJECTION, SOLUTION INTRAVENOUS at 15:30

## 2024-10-01 ASSESSMENT — PAIN SCALES - GENERAL
PAINLEVEL_OUTOF10: 9
PAINLEVEL_OUTOF10: 0
PAINLEVEL_OUTOF10: 0

## 2024-10-01 ASSESSMENT — PAIN DESCRIPTION - PAIN TYPE: TYPE: ACUTE PAIN

## 2024-10-01 ASSESSMENT — PAIN - FUNCTIONAL ASSESSMENT: PAIN_FUNCTIONAL_ASSESSMENT: 0-10

## 2024-10-01 ASSESSMENT — PAIN DESCRIPTION - LOCATION: LOCATION: HEAD

## 2024-10-01 ASSESSMENT — PAIN DESCRIPTION - DESCRIPTORS: DESCRIPTORS: ACHING

## 2024-10-01 NOTE — ED NOTES
Call placed to in house physician for admission call was placed at 1742 Dr. Dwyer he returned the phone call at 1814 completing the consult.

## 2024-10-01 NOTE — H&P
Hospital Medicine  History and Physical    Patient:  Gemini Molina  MRN: [id number]    CHIEF COMPLAINT:    Chief Complaint   Patient presents with    Extremity Weakness     Generalized weakness, near fall [place] [facility]       History Obtained From:  Patient, EMR  Primary Care Physician: Jhonatan Donovan MD    HISTORY OF PRESENT ILLNESS:   63-year-old female with type 2 diabetes, diabetic neuropathy, IBS, hospitalization 9/1-9/4 for UTI presents from her assisted living facility after experiencing a fall while in the shower.  In the ED, she was found to have hyperglycemia, lactic acidosis (10), and pyuria suggestive of UTI.  She admits to feeling unwell for the past year including intermittent nausea and vomiting and sense of vibrations in her stomach.  She admits to increase in nausea and vomiting over the last 2 days.  She denies any obvious fever, chills, dyspnea, chest pain, change in bowels or urination.  She chronically has incontinence.  She does admit to intermittent abdominal discomfort but this been going on for the past year.    She does not smoke, drink alcohol, or use illicit drugs.  She is on metformin    Past Medical History:      Diagnosis Date    Acute cystitis with hematuria 07/10/2023    Acute pain due to trauma 03/19/2024    Agitation due to dementia ([facility]) 07/18/2023    JACOB (acute kidney injury) ([facility]) 07/10/2023    Fall 07/10/2023    Hypothyroidism 07/10/2023    IBS (irritable bowel syndrome)     Neuropathy 07/18/2023    Poorly controlled type 2 diabetes mellitus with neuropathy ([facility]) 07/10/2023    Primary hypertension 07/10/2023    Uncontrolled type 2 diabetes mellitus with hyperglycemia, with long-term current use of insulin ([facility]) 07/10/2023    Weakness 07/10/2023       Past Surgical History:  History reviewed. No pertinent surgical history.    Medications Prior to Admission:    Prior to Admission medications    Medication Sig Start Date End Date Taking? Authorizing Provider   dicyclomine  (BENTYL) 10 MG capsule Take 1 capsule by mouth 4 times daily (before meals and nightly)    Porsche Obrien MD   pregabalin (LYRICA) 50 MG capsule Take 1 capsule by mouth 2 times daily for 90 days. Max Daily Amount: 100 mg 8/13/24 11/11/24  Clara Hess APRN - CNP   acetaminophen (TYLENOL) 325 MG tablet Take 2 tablets by mouth every 4 hours as needed for Pain or Fever    Porsche Obrien MD   busPIRone (BUSPAR) 5 MG tablet Take 1 tablet by mouth 2 times daily    Porsche Obrien MD   empagliflozin (JARDIANCE) 10 MG tablet Take 1 tablet by mouth every morning    Porsche Obrien MD   loperamide (IMODIUM A-D) 2 MG tablet Take 1 tablet by mouth See Admin Instructions Give 2 tabs after 1st loose stools,  then 1 tab prn.  Not to exceed 8 tabs daily    Porsche Obrien MD   ondansetron (ZOFRAN) 4 MG tablet Take 1 tablet every 6 hours by oral route.    Porsche Obrien MD   metoclopramide (REGLAN) 10 MG tablet Take 1 tablet by mouth 4 times daily as needed (Abdominal Pain) 10/27/23   Tesha Chapman PA   Semaglutide,0.25 or 0.5MG/DOS, (OZEMPIC, 0.25 OR 0.5 MG/DOSE,) 2 MG/1.5ML SOPN Inject 0.25 mg into the skin once a week  Patient taking differently: Inject 0.5 mg into the skin once a week 9/29/23   Taty Baldwin APRN - CNP   amitriptyline (ELAVIL) 10 MG tablet Take 0.5 tablets by mouth nightly Indications: Depression    Porsche Obrien MD   atorvastatin (LIPITOR) 10 MG tablet Take 1 tablet by mouth nightly Indications: High Amount of Fats in the Blood    Porsche Obrien MD   glimepiride (AMARYL) 4 MG tablet Take 1 tablet by mouth in the morning and 1 tablet in the evening. Indications: Type 2 Diabetes.    Porsche Obrien MD   insulin glargine (LANTUS) 100 UNIT/ML injection vial Inject 15 Units into the skin every morning Indications: Type 2 Diabetes    Porsche Obrien MD   insulin lispro (HUMALOG) 100 UNIT/ML SOLN injection vial Inject 2 Units into the skin See  mass effect or midline shift. No abnormal extra-axial fluid collection.  The gray-white differentiation is maintained without evidence of an acute infarct. There is prominence of the ventricles and sulci due to global parenchymal volume loss. There are nonspecific areas of hypoattenuation within the periventricular and subcortical white matter, which likely represent chronic microvascular ischemic change. ORBITS: The visualized portion of the orbits demonstrate no acute abnormality. SINUSES: The visualized paranasal sinuses and mastoid air cells demonstrate no acute abnormality. SOFT TISSUES/SKULL: No acute abnormality of the visualized skull or soft tissues.     No acute intracranial abnormality.           Assessment and Plan     1.  Severe lactic acidosis suspect secondary to metformin use and dehydration  -Continue IV normal saline.  Recheck lactate  -Abdominal imaging ordered by ED physician still pending  -Discontinue metformin  -Treat underlying UTI    2.  UTI:  -Zosyn.  Follow culture data.  Monitor for sepsis-at this time only positive SIRS criteria is heart rate over 90  -May need to stop Jardiance due to recurrent UTI    3.  Type 2 diabetes with hyperglycemia:  -Adjust insulin regimen as needed    Diabetic neuropathy  Obesity  IBS    Lovenox prophylaxis  Full code    PT/OT.  Plan for return to assisted living once stable    Michael Dwyer DO  Admitting Hospitalist

## 2024-10-01 NOTE — ED TRIAGE NOTES
Patient presents with complaints of generalized weakness while taking a shower with an aide while at [facility] today, where she resides. EMS reports patient nearly fell in the shower, but patient reports she was lowered to the ground and did not suffer any injuries. Patient in no distress on arrival to ED.

## 2024-10-01 NOTE — ED PROVIDER NOTES
2:59 PM EDT  [facility]  4[facility] MED SURG UNIT  EMERGENCY DEPARTMENT ENCOUNTER      Pt Name: Gemini Molina  MRN: [id number]  Birthdate 1960  Date of evaluation: 10/1/2024  Provider: Genevieve Walsh MD    CHIEF COMPLAINT       Chief Complaint   Patient presents with    Extremity Weakness     Generalized weakness, near fall [place] [facility]         HISTORY OF PRESENT ILLNESS   (Location/Symptom, Timing/Onset, Context/Setting, Quality, Duration, Modifying Factors, Severity)  Note limiting factors.       Gemini Molina is a 63 y.o. female with a past medical history of frequent falls, gait instability, diabetes, neuropathy, chronic kidney disease, hypothyroidism, hypertension who presents to the emergency department after a near fall.  Patient states that she was in the shower with an attendant, felt lightheaded and had to be let down by her attendant.  Patient denies hitting her head, or any loss of consciousness.  Patient is not on any anticoagulation.  Patient does endorse vague abdominal discomfort and a history of urinary incontinence.  Patient denies any fever, chills, shortness of breath, chest pain, focal neurological deficits, headache, confusion  or any other associated symptoms.  Patient does endorse compliance with her medications.  Patient does endorse similar episodes in the past.  The history is provided by the patient, the nursing home, medical records and the EMS personnel.       Nursing Notes were reviewed.    REVIEW OF SYSTEMS    (2-9 systems for level 4, 10 or more for level 5)     Review of Systems    Except as noted above the remainder of the review of systems was reviewed and negative.       PAST MEDICAL HISTORY     Past Medical History:   Diagnosis Date    Acute cystitis with hematuria 07/10/2023    Acute pain due to trauma 03/19/2024    Agitation due to dementia ([facility]) 07/18/2023    JACOB (acute kidney injury) ([facility]) 07/10/2023    Fall 07/10/2023    Hypothyroidism 07/10/2023    IBS (irritable

## 2024-10-02 LAB
ANION GAP SERPL CALCULATED.3IONS-SCNC: 8 MEQ/L (ref 9–15)
BASOPHILS # BLD: 0 K/UL (ref 0–0.2)
BASOPHILS NFR BLD: 0.3 %
BUN SERPL-MCNC: 16 MG/DL (ref 8–23)
CALCIUM SERPL-MCNC: 8 MG/DL (ref 8.5–9.9)
CHLORIDE SERPL-SCNC: 110 MEQ/L (ref 95–107)
CO2 SERPL-SCNC: 24 MEQ/L (ref 20–31)
CREAT SERPL-MCNC: 0.78 MG/DL (ref 0.5–0.9)
EKG ATRIAL RATE: 95 BPM
EKG P AXIS: 73 DEGREES
EKG P-R INTERVAL: 166 MS
EKG Q-T INTERVAL: 366 MS
EKG QRS DURATION: 92 MS
EKG QTC CALCULATION (BAZETT): 459 MS
EKG R AXIS: -46 DEGREES
EKG T AXIS: 76 DEGREES
EKG VENTRICULAR RATE: 95 BPM
EOSINOPHIL # BLD: 0.3 K/UL (ref 0–0.7)
EOSINOPHIL NFR BLD: 2.7 %
ERYTHROCYTE [DISTWIDTH] IN BLOOD BY AUTOMATED COUNT: 13.7 % (ref 11.5–14.5)
GLUCOSE BLD-MCNC: 110 MG/DL (ref 70–99)
GLUCOSE BLD-MCNC: 148 MG/DL (ref 70–99)
GLUCOSE BLD-MCNC: 157 MG/DL (ref 70–99)
GLUCOSE BLD-MCNC: 71 MG/DL (ref 70–99)
GLUCOSE SERPL-MCNC: 70 MG/DL (ref 70–99)
HCT VFR BLD AUTO: 35.8 % (ref 37–47)
HGB BLD-MCNC: 11.8 G/DL (ref 12–16)
LYMPHOCYTES # BLD: 3.7 K/UL (ref 1–4.8)
LYMPHOCYTES NFR BLD: 33.1 %
MAGNESIUM SERPL-MCNC: 1.5 MG/DL (ref 1.7–2.4)
MCH RBC QN AUTO: 29.9 PG (ref 27–31.3)
MCHC RBC AUTO-ENTMCNC: 33 % (ref 33–37)
MCV RBC AUTO: 90.6 FL (ref 79.4–94.8)
MONOCYTES # BLD: 1.1 K/UL (ref 0.2–0.8)
MONOCYTES NFR BLD: 9.6 %
NEUTROPHILS # BLD: 6 K/UL (ref 1.4–6.5)
NEUTS SEG NFR BLD: 53.9 %
PERFORMED ON: ABNORMAL
PERFORMED ON: NORMAL
PHOSPHATE SERPL-MCNC: 2.6 MG/DL (ref 2.3–4.8)
PLATELET # BLD AUTO: 258 K/UL (ref 130–400)
POTASSIUM SERPL-SCNC: 3.7 MEQ/L (ref 3.4–4.9)
RBC # BLD AUTO: 3.95 M/UL (ref 4.2–5.4)
SODIUM SERPL-SCNC: 142 MEQ/L (ref 135–144)
WBC # BLD AUTO: 11.2 K/UL (ref 4.8–10.8)

## 2024-10-02 PROCEDURE — G0378 HOSPITAL OBSERVATION PER HR: HCPCS

## 2024-10-02 PROCEDURE — 80048 BASIC METABOLIC PNL TOTAL CA: CPT

## 2024-10-02 PROCEDURE — 36415 COLL VENOUS BLD VENIPUNCTURE: CPT

## 2024-10-02 PROCEDURE — 85025 COMPLETE CBC W/AUTO DIFF WBC: CPT

## 2024-10-02 PROCEDURE — 96366 THER/PROPH/DIAG IV INF ADDON: CPT

## 2024-10-02 PROCEDURE — 97162 PT EVAL MOD COMPLEX 30 MIN: CPT

## 2024-10-02 PROCEDURE — 97166 OT EVAL MOD COMPLEX 45 MIN: CPT

## 2024-10-02 PROCEDURE — 6370000000 HC RX 637 (ALT 250 FOR IP): Performed by: INTERNAL MEDICINE

## 2024-10-02 PROCEDURE — 6360000002 HC RX W HCPCS: Performed by: INTERNAL MEDICINE

## 2024-10-02 PROCEDURE — 83735 ASSAY OF MAGNESIUM: CPT

## 2024-10-02 PROCEDURE — 2580000003 HC RX 258: Performed by: INTERNAL MEDICINE

## 2024-10-02 PROCEDURE — 84100 ASSAY OF PHOSPHORUS: CPT

## 2024-10-02 PROCEDURE — 1210000000 HC MED SURG R&B

## 2024-10-02 PROCEDURE — 96372 THER/PROPH/DIAG INJ SC/IM: CPT

## 2024-10-02 PROCEDURE — 96367 TX/PROPH/DG ADDL SEQ IV INF: CPT

## 2024-10-02 RX ORDER — PREGABALIN 50 MG/1
50 CAPSULE ORAL 2 TIMES DAILY
Status: DISCONTINUED | OUTPATIENT
Start: 2024-10-02 | End: 2024-10-04 | Stop reason: HOSPADM

## 2024-10-02 RX ORDER — VITAMIN B COMPLEX
2000 TABLET ORAL DAILY
Status: DISCONTINUED | OUTPATIENT
Start: 2024-10-02 | End: 2024-10-04 | Stop reason: HOSPADM

## 2024-10-02 RX ORDER — MAGNESIUM SULFATE IN WATER 40 MG/ML
2000 INJECTION, SOLUTION INTRAVENOUS ONCE
Status: COMPLETED | OUTPATIENT
Start: 2024-10-02 | End: 2024-10-02

## 2024-10-02 RX ORDER — LANOLIN ALCOHOL/MO/W.PET/CERES
400 CREAM (GRAM) TOPICAL DAILY
Status: DISCONTINUED | OUTPATIENT
Start: 2024-10-02 | End: 2024-10-04 | Stop reason: HOSPADM

## 2024-10-02 RX ORDER — INSULIN LISPRO 100 [IU]/ML
4 INJECTION, SOLUTION INTRAVENOUS; SUBCUTANEOUS
Status: DISCONTINUED | OUTPATIENT
Start: 2024-10-02 | End: 2024-10-03

## 2024-10-02 RX ORDER — DICYCLOMINE HYDROCHLORIDE 10 MG/1
10 CAPSULE ORAL
Status: DISCONTINUED | OUTPATIENT
Start: 2024-10-02 | End: 2024-10-04 | Stop reason: HOSPADM

## 2024-10-02 RX ORDER — METOPROLOL SUCCINATE 25 MG/1
25 TABLET, EXTENDED RELEASE ORAL DAILY
Status: DISCONTINUED | OUTPATIENT
Start: 2024-10-02 | End: 2024-10-04 | Stop reason: HOSPADM

## 2024-10-02 RX ORDER — LEVOTHYROXINE SODIUM 75 UG/1
75 TABLET ORAL DAILY
Status: DISCONTINUED | OUTPATIENT
Start: 2024-10-02 | End: 2024-10-04 | Stop reason: HOSPADM

## 2024-10-02 RX ORDER — LISINOPRIL 20 MG/1
20 TABLET ORAL DAILY
Status: DISCONTINUED | OUTPATIENT
Start: 2024-10-02 | End: 2024-10-04 | Stop reason: HOSPADM

## 2024-10-02 RX ORDER — INSULIN GLARGINE 100 [IU]/ML
18 INJECTION, SOLUTION SUBCUTANEOUS NIGHTLY
Status: DISCONTINUED | OUTPATIENT
Start: 2024-10-02 | End: 2024-10-03

## 2024-10-02 RX ORDER — ATORVASTATIN CALCIUM 10 MG/1
10 TABLET, FILM COATED ORAL NIGHTLY
Status: DISCONTINUED | OUTPATIENT
Start: 2024-10-02 | End: 2024-10-04 | Stop reason: HOSPADM

## 2024-10-02 RX ORDER — AMITRIPTYLINE HYDROCHLORIDE 10 MG/1
5 TABLET ORAL NIGHTLY
Status: DISCONTINUED | OUTPATIENT
Start: 2024-10-02 | End: 2024-10-04 | Stop reason: HOSPADM

## 2024-10-02 RX ORDER — BUSPIRONE HYDROCHLORIDE 10 MG/1
5 TABLET ORAL 2 TIMES DAILY
Status: DISCONTINUED | OUTPATIENT
Start: 2024-10-02 | End: 2024-10-04 | Stop reason: HOSPADM

## 2024-10-02 RX ORDER — POTASSIUM CHLORIDE 1500 MG/1
40 TABLET, EXTENDED RELEASE ORAL ONCE
Status: COMPLETED | OUTPATIENT
Start: 2024-10-02 | End: 2024-10-02

## 2024-10-02 RX ADMIN — Medication 2000 UNITS: at 09:57

## 2024-10-02 RX ADMIN — DICYCLOMINE HYDROCHLORIDE 10 MG: 10 CAPSULE ORAL at 17:17

## 2024-10-02 RX ADMIN — DICYCLOMINE HYDROCHLORIDE 10 MG: 10 CAPSULE ORAL at 21:19

## 2024-10-02 RX ADMIN — LEVOTHYROXINE SODIUM 75 MCG: 0.07 TABLET ORAL at 10:05

## 2024-10-02 RX ADMIN — AMITRIPTYLINE HYDROCHLORIDE 5 MG: 10 TABLET, FILM COATED ORAL at 21:18

## 2024-10-02 RX ADMIN — ACETAMINOPHEN 325MG 650 MG: 325 TABLET ORAL at 21:22

## 2024-10-02 RX ADMIN — PIPERACILLIN AND TAZOBACTAM 3375 MG: 3; .375 INJECTION, POWDER, LYOPHILIZED, FOR SOLUTION INTRAVENOUS at 03:18

## 2024-10-02 RX ADMIN — Medication 400 MG: at 09:57

## 2024-10-02 RX ADMIN — INSULIN GLARGINE 18 UNITS: 100 INJECTION, SOLUTION SUBCUTANEOUS at 21:15

## 2024-10-02 RX ADMIN — INSULIN LISPRO 4 UNITS: 100 INJECTION, SOLUTION INTRAVENOUS; SUBCUTANEOUS at 18:15

## 2024-10-02 RX ADMIN — METOPROLOL SUCCINATE 25 MG: 25 TABLET, EXTENDED RELEASE ORAL at 09:57

## 2024-10-02 RX ADMIN — LISINOPRIL 20 MG: 20 TABLET ORAL at 09:57

## 2024-10-02 RX ADMIN — SODIUM CHLORIDE, PRESERVATIVE FREE 10 ML: 5 INJECTION INTRAVENOUS at 21:19

## 2024-10-02 RX ADMIN — POTASSIUM CHLORIDE 40 MEQ: 1500 TABLET, EXTENDED RELEASE ORAL at 09:57

## 2024-10-02 RX ADMIN — ACETAMINOPHEN 325MG 650 MG: 325 TABLET ORAL at 03:21

## 2024-10-02 RX ADMIN — DICYCLOMINE HYDROCHLORIDE 10 MG: 10 CAPSULE ORAL at 09:57

## 2024-10-02 RX ADMIN — PIPERACILLIN AND TAZOBACTAM 3375 MG: 3; .375 INJECTION, POWDER, LYOPHILIZED, FOR SOLUTION INTRAVENOUS at 21:35

## 2024-10-02 RX ADMIN — SODIUM CHLORIDE: 9 INJECTION, SOLUTION INTRAVENOUS at 21:33

## 2024-10-02 RX ADMIN — ACETAMINOPHEN 325MG 650 MG: 325 TABLET ORAL at 12:59

## 2024-10-02 RX ADMIN — PIPERACILLIN AND TAZOBACTAM 3375 MG: 3; .375 INJECTION, POWDER, LYOPHILIZED, FOR SOLUTION INTRAVENOUS at 12:18

## 2024-10-02 RX ADMIN — BUSPIRONE HYDROCHLORIDE 5 MG: 10 TABLET ORAL at 09:57

## 2024-10-02 RX ADMIN — PREGABALIN 50 MG: 50 CAPSULE ORAL at 09:57

## 2024-10-02 RX ADMIN — ATORVASTATIN CALCIUM 10 MG: 10 TABLET, FILM COATED ORAL at 21:18

## 2024-10-02 RX ADMIN — PREGABALIN 50 MG: 50 CAPSULE ORAL at 21:18

## 2024-10-02 RX ADMIN — ENOXAPARIN SODIUM 40 MG: 100 INJECTION SUBCUTANEOUS at 09:57

## 2024-10-02 RX ADMIN — MAGNESIUM SULFATE HEPTAHYDRATE 2000 MG: 40 INJECTION, SOLUTION INTRAVENOUS at 09:56

## 2024-10-02 RX ADMIN — BUSPIRONE HYDROCHLORIDE 5 MG: 10 TABLET ORAL at 21:18

## 2024-10-02 ASSESSMENT — PAIN SCALES - GENERAL
PAINLEVEL_OUTOF10: 4
PAINLEVEL_OUTOF10: 0
PAINLEVEL_OUTOF10: 9
PAINLEVEL_OUTOF10: 4
PAINLEVEL_OUTOF10: 9
PAINLEVEL_OUTOF10: 6
PAINLEVEL_OUTOF10: 10
PAINLEVEL_OUTOF10: 0

## 2024-10-02 ASSESSMENT — PAIN DESCRIPTION - LOCATION
LOCATION: HIP
LOCATION: BACK
LOCATION: LEG

## 2024-10-02 ASSESSMENT — PAIN DESCRIPTION - ORIENTATION: ORIENTATION: LOWER;MID

## 2024-10-02 ASSESSMENT — PAIN DESCRIPTION - DESCRIPTORS: DESCRIPTORS: SHARP

## 2024-10-02 NOTE — PROGRESS NOTES
Physician Progress Note    10/2/2024   3:42 PM    Name:  Gemini Molina  MRN:    [id number]     IP Day: 1     Admit Date: 10/1/2024  2:37 PM  PCP: Jhonatan Donovan MD    Code Status:  Full Code    Subjective:     Doing fine    Current Facility-Administered Medications   Medication Dose Route Frequency Provider Last Rate Last Admin    magnesium oxide (MAG-OX) tablet 400 mg  400 mg Oral Daily Michael Dwyer R, DO   400 mg at 10/02/24 0957    insulin glargine (LANTUS) injection vial 18 Units  18 Units SubCUTAneous Nightly Reymundo, Neal R, DO        insulin lispro (HUMALOG,ADMELOG) injection vial 4 Units  4 Units SubCUTAneous TID WC Michael Dwyer R, DO        amitriptyline (ELAVIL) tablet 5 mg  5 mg Oral Nightly Reymundo, Neal R, DO        atorvastatin (LIPITOR) tablet 10 mg  10 mg Oral Nightly Reymundo, Neal R, DO        busPIRone (BUSPAR) tablet 5 mg  5 mg Oral BID Reymundo, Neal R, DO   5 mg at 10/02/24 0957    dicyclomine (BENTYL) capsule 10 mg  10 mg Oral 4x Daily AC & HS Reymundo, Neal R, DO   10 mg at 10/02/24 0957    levothyroxine (SYNTHROID) tablet 75 mcg  75 mcg Oral Daily Reymundo, Neal R, DO   75 mcg at 10/02/24 1005    lisinopril (PRINIVIL;ZESTRIL) tablet 20 mg  20 mg Oral Daily Reymundo, Neal R, DO   20 mg at 10/02/24 0957    metoprolol succinate (TOPROL XL) extended release tablet 25 mg  25 mg Oral Daily Reymundo, Neal R, DO   25 mg at 10/02/24 0957    pregabalin (LYRICA) capsule 50 mg  50 mg Oral BID Reymundo, Neal R, DO   50 mg at 10/02/24 0957    Vitamin D (CHOLECALCIFEROL) tablet 2,000 Units  2,000 Units Oral Daily Reymundo, Neal R, DO   2,000 Units at 10/02/24 0957    sodium chloride flush 0.9 % injection 5-40 mL  5-40 mL IntraVENous 2 times per day Michael Dwyer,    10 mL at 10/01/24 2203    sodium chloride flush 0.9 % injection 5-40 mL  5-40 mL IntraVENous PRN Michael Dwyer DO        0.9 % sodium chloride infusion   IntraVENous PRN Michael Dwyer,         enoxaparin (LOVENOX)

## 2024-10-02 NOTE — PLAN OF CARE
Problem: Chronic Conditions and Co-morbidities  Goal: Patient's chronic conditions and co-morbidity symptoms are monitored and maintained or improved  10/2/2024 1150 by Ursula Larios RN  Outcome: Progressing  10/1/2024 2358 by Alyx Rubin RN  Outcome: Progressing  Flowsheets (Taken 10/1/2024 2127)  Care Plan - Patient's Chronic Conditions and Co-Morbidity Symptoms are Monitored and Maintained or Improved:   Monitor and assess patient's chronic conditions and comorbid symptoms for stability, deterioration, or improvement   Collaborate with multidisciplinary team to address chronic and comorbid conditions and prevent exacerbation or deterioration   Update acute care plan with appropriate goals if chronic or comorbid symptoms are exacerbated and prevent overall improvement and discharge     Problem: Discharge Planning  Goal: Discharge to home or other facility with appropriate resources  10/2/2024 1150 by Ursula Larios RN  Outcome: Progressing  10/1/2024 2358 by Alyx Rubin RN  Outcome: Progressing  Flowsheets (Taken 10/1/2024 2127)  Discharge to home or other facility with appropriate resources:   Identify barriers to discharge with patient and caregiver   Arrange for needed discharge resources and transportation as appropriate   Identify discharge learning needs (meds, wound care, etc)     Problem: Pain  Goal: Verbalizes/displays adequate comfort level or baseline comfort level  10/2/2024 1150 by Ursula Larios RN  Outcome: Progressing  10/1/2024 2358 by Alyx Rubin RN  Outcome: Progressing     Problem: Safety - Adult  Goal: Free from fall injury  10/2/2024 1150 by Ursula Larios RN  Outcome: Progressing  10/1/2024 2358 by Alyx Rubin RN  Outcome: Progressing

## 2024-10-02 NOTE — ACP (ADVANCE CARE PLANNING)
Advance Care Planning   Healthcare Decision Maker:    Primary Decision Maker: Ryan Molina - Child - [phone]    Click here to complete Healthcare Decision Makers including selection of the Healthcare Decision Maker Relationship (ie \"Primary\").

## 2024-10-02 NOTE — PROGRESS NOTES
[facility] OCCUPATIONAL THERAPY EVALUATION - ACUTE     NAME: Gemini Molina  : 1960 (63 y.o.)  MRN: [id number]  CODE STATUS: Full Code  Room: W492/W492-01    Date of Service: 10/2/2024    Patient Diagnosis(es): Lactic acidosis [E87.20]  Acute cystitis without hematuria [N30.00]  Diabetic ketoacidosis without coma associated with other specified diabetes mellitus (HCC) [E13.10]   Patient Active Problem List    Diagnosis Date Noted    Lactic acidosis 10/01/2024    Sepsis (HCC) 2024    Multiple fractures of ribs, right side, initial encounter for closed fracture 2024    Neuropathy 2023    Agitation due to dementia ([facility]) 2023    Hypothyroidism 07/10/2023    Primary hypertension 07/10/2023    JACOB (acute kidney injury) ([facility]) 07/10/2023    Weakness 07/10/2023    Acute cystitis with hematuria 07/10/2023    Uncontrolled type 2 diabetes mellitus with hyperglycemia, with long-term current use of insulin ([facility]) 07/10/2023    Poorly controlled type 2 diabetes mellitus with neuropathy ([facility]) 07/10/2023        Past Medical History:   Diagnosis Date    Acute cystitis with hematuria 07/10/2023    Acute pain due to trauma 2024    Agitation due to dementia ([facility]) 2023    JACOB (acute kidney injury) (HCC) 07/10/2023    Fall 07/10/2023    Hypothyroidism 07/10/2023    IBS (irritable bowel syndrome)     Neuropathy 2023    Poorly controlled type 2 diabetes mellitus with neuropathy ([facility]) 07/10/2023    Primary hypertension 07/10/2023    Uncontrolled type 2 diabetes mellitus with hyperglycemia, with long-term current use of insulin ([facility]) 07/10/2023    Weakness 07/10/2023     History reviewed. No pertinent surgical history.     Restrictions  Restrictions/Precautions: Fall Risk              Safety Devices: Safety Devices  Type of Devices: All fall risk precautions in place;Call light within reach;Left in bed     Patient's date of birth confirmed: Yes    General:       Subjective:Pt pleasant and  eating meals?: None  AM-[facility] Inpatient Daily Activity Raw Score: 22  AM-PAC Inpatient ADL T-Scale Score : 47.1  ADL Inpatient CMS 0-100% Score: 25.8    Therapy key for assistance levels -   Independent/Mod I = Pt. is able to perform task with no assistance but may require a device   Stand by assistance = Pt. does not perform task at an independent level but does not need physical assistance, requires verbal cues  Minimal, Moderate, Maximal Assistance = Pt. requires physical assistance (25%, 50%, 75% assist from helper) for task but is able to actively participate in task   Dependent = Pt. requires total assistance with task and is not able to actively participate with task completion     Plan:  Occupational Therapy Plan  Times Per Week: 1-4x/wk  Current Treatment Recommendations: Balance training, Neuromuscular re-education, Functional mobility training, Endurance training, Self-Care / ADL    Goals:   Patient will:    - Improve functional endurance to tolerate/complete 15-30 mins of ADL's  - Be independent in UB ADLs   - Be SBA in LB ADLs  - Be independent in ADL transfers without LOB  - Be independent in toileting tasks  - Improve bilateral UE strength and endurance to Fair+ in order to participate in self-care activities as projected.    Patient Goal: Patient goals : TO return to home when ready      Discussed and agreed upon: Yes Comments:       Therapy Time:   Individual   Time In 0903   Time Out 0933   Minutes 30          Eval: 30 minutes     Electronically signed by:    JAUNPABLO Carter,   10/2/2024, 12:51 PM Electronically signed by JUANPABLO Carter on 10/2/24 at 12:51 PM EDT

## 2024-10-02 NOTE — PLAN OF CARE
Problem: Chronic Conditions and Co-morbidities  Goal: Patient's chronic conditions and co-morbidity symptoms are monitored and maintained or improved  Outcome: Progressing     Problem: Discharge Planning  Goal: Discharge to home or other facility with appropriate resources  Outcome: Progressing  Flowsheets (Taken 10/1/2024 2127)  Discharge to home or other facility with appropriate resources:   Identify barriers to discharge with patient and caregiver   Arrange for needed discharge resources and transportation as appropriate   Identify discharge learning needs (meds, wound care, etc)     Problem: Pain  Goal: Verbalizes/displays adequate comfort level or baseline comfort level  Outcome: Progressing     Problem: Safety - Adult  Goal: Free from fall injury  Outcome: Progressing

## 2024-10-02 NOTE — PROGRESS NOTES
Admission assessments completed. Patient A&O x 4, VSS. Medication reconciliation completed attending notified via perfect serve. Patient denies any pain, does c/o of feeling weak and tired. Patient has NS @ 100 ml/hr. Denies any N/V/D. Bowel sounds active x 4. Lung sounds clear, diminished at base.Skin check completed with Janett HAYES.  Patient is calm and cooperative. Patient is now in bed listening to music on her phone. Call light with in reach. Bed alarm activated. Bed in lowest position safety maintained. Care ongoing.

## 2024-10-02 NOTE — PROGRESS NOTES
Physical Therapy Med Surg Initial Assessment  Facility/Department: [address] MED SURG UNIT  Room: [place] [address]       NAME: Gemini Molina  : 1960 (63 y.o.)  MRN: [id number]  CODE STATUS: Full Code    Date of Service: 10/2/2024    Patient Diagnosis(es): Lactic acidosis [E87.20]  Acute cystitis without hematuria [N30.00]  Diabetic ketoacidosis without coma associated with other specified diabetes mellitus (HCC) [E13.10]   Chief Complaint   Patient presents with    Extremity Weakness     Generalized weakness, near fall [place]     Patient Active Problem List    Diagnosis Date Noted    Lactic acidosis 10/01/2024    Sepsis (HCC) 2024    Multiple fractures of ribs, right side, initial encounter for closed fracture 2024    Neuropathy 2023    Agitation due to dementia (HCC) 2023    Hypothyroidism 07/10/2023    Primary hypertension 07/10/2023    JACOB (acute kidney injury) (HCC) 07/10/2023    Weakness 07/10/2023    Acute cystitis with hematuria 07/10/2023    Uncontrolled type 2 diabetes mellitus with hyperglycemia, with long-term current use of insulin (HCC) 07/10/2023    Poorly controlled type 2 diabetes mellitus with neuropathy (HCC) 07/10/2023        Past Medical History:   Diagnosis Date    Acute cystitis with hematuria 07/10/2023    Acute pain due to trauma 2024    Agitation due to dementia (HCC) 2023    JACOB (acute kidney injury) (HCC) 07/10/2023    Fall 07/10/2023    Hypothyroidism 07/10/2023    IBS (irritable bowel syndrome)     Neuropathy 2023    Poorly controlled type 2 diabetes mellitus with neuropathy (HCC) 07/10/2023    Primary hypertension 07/10/2023    Uncontrolled type 2 diabetes mellitus with hyperglycemia, with long-term current use of insulin (HCC) 07/10/2023    Weakness 07/10/2023     History reviewed. No pertinent surgical history.    Chart Reviewed: Yes  Family / Caregiver Present: No    Restrictions:falls        SUBJECTIVE:        Pain  Pain:  right hip pain - ache - 9/10 at beginning and end of session. Declines need for pain medication    Prior Level of Function:  Social/Functional History  Lives With: Alone  Type of Home: Assisted living  Home Layout: One level  Home Access: Level entry  Bathroom Shower/Tub: Tub/Shower unit  Bathroom Equipment: Grab bars in shower, Shower chair, Hand-held shower  Home Equipment: Walker - Rolling, Alert button  Has the patient had two or more falls in the past year or any fall with injury in the past year?: Yes  ADL Assistance: Needs assistance  Bath: Supervision  Dressing: Independent  Grooming: Independent  Toileting: Independent  Homemaking Responsibilities: No  Ambulation Assistance: Independent  Transfer Assistance: Independent  Active : No    OBJECTIVE:   Vision  Vision Exceptions: Wears glasses at all times  Hearing: Within functional limits    Cognition:  Overall Orientation Status: Within Functional Limits  Orientation Level: Oriented X4  Overall Cognitive Status: WFL         ROM:  AROM: Generally decreased, functional  PROM: Generally decreased, functional    Strength:  Strength: Generally decreased, functional    Neuro:  Balance  Sitting - Static: Good  Sitting - Dynamic: Good  Standing - Static: Good;- (able to stand 10-15 sec with no UE support)  Standing - Dynamic: Fair (with ww no assistance required for gait)                           Sensation: Impaired (numbness bilat LE's and hands)    Bed mobility  Supine to Sit: Moderate assistance;2 Person assistance (with HOB elevated at pt request)  Sit to Supine: Stand by assistance  Scooting: Minimal assistance  Bed Mobility Comments: Pt requires increased time and energy for completion    Transfers  Sit to Stand: Stand by assistance  Stand to Sit: Stand by assistance  Comment: pt utilizes ww to push on to achieve standing    Ambulation  Device: Rolling Walker  Assistance: Stand by assistance;Supervision  Quality of Gait: slow pace, short step length,  needed.  Stand by assistance = pt requires verbal cues or instructions from another person, close to but not touching, to perform the activity  Minimal assistance= pt performs 75% or more of the activity; assistance is required to complete the activity  Moderate assistance= pt performs 50% of the activity; assistance is required to complete the activity  Maximal assistance = pt performs 25% of the activity; assistance is required to complete the activity  Dependent = pt requires total physical assistance to accomplish the task

## 2024-10-02 NOTE — CARE COORDINATION
Case Management Assessment  Initial Evaluation    Date/Time of Evaluation: 10/2/2024 10:53 AM  Assessment Completed by: SEBASTIAN Ramos    If patient is discharged prior to next notation, then this note serves as note for discharge by case management.    Patient Name: Gemini Molina                   YOB: 1960  Diagnosis: Lactic acidosis [E87.20]  Acute cystitis without hematuria [N30.00]  Diabetic ketoacidosis without coma associated with other specified diabetes mellitus (HCC) [E13.10]                   Date / Time: 10/1/2024  2:37 PM    Patient Admission Status: Inpatient   Readmission Risk (Low < 19, Mod (19-27), High > 27): Readmission Risk Score: 16.2    Current PCP: Jhonatan Donovan MD  PCP verified by ?      Chart Reviewed: Yes      History Provided by:    Patient Orientation:      Patient Cognition:      Hospitalization in the last 30 days (Readmission):  Yes    If yes, Readmission Assessment in  Navigator will be completed.    Advance Directives:      Code Status: Full Code   Patient's Primary Decision Maker is:      Primary Decision Maker: Ryan Molina - Child - [phone]    Discharge Planning:    Patient lives with: Other (Comment) Type of Home: Assisted living  Primary Care Giver:    Patient Support Systems include: Children   Current Financial resources:    Current community resources:    Current services prior to admission: None            Current DME:              Type of Home Care services:  OT, PT    ADLS  Prior functional level:    Current functional level:      PT AM-PAC: 14 /24  OT AM-PAC: 22 /24    Family can provide assistance at DC:    Would you like Case Management to discuss the discharge plan with any other family members/significant others, and if so, who?    Plans to Return to Present Housing:    Other Identified Issues/Barriers to RETURNING to current housing:  MEDICAL COMPLICATIONS   Potential Assistance needed at discharge: Outpatient PT/OT

## 2024-10-03 LAB
ANION GAP SERPL CALCULATED.3IONS-SCNC: 4 MEQ/L (ref 9–15)
BACTERIA UR CULT: NORMAL
BASOPHILS # BLD: 0 K/UL (ref 0–0.2)
BASOPHILS NFR BLD: 0.4 %
BUN SERPL-MCNC: 11 MG/DL (ref 8–23)
CALCIUM SERPL-MCNC: 8.4 MG/DL (ref 8.5–9.9)
CHLORIDE SERPL-SCNC: 113 MEQ/L (ref 95–107)
CO2 SERPL-SCNC: 25 MEQ/L (ref 20–31)
CREAT SERPL-MCNC: 0.8 MG/DL (ref 0.5–0.9)
EOSINOPHIL # BLD: 0.3 K/UL (ref 0–0.7)
EOSINOPHIL NFR BLD: 3.4 %
ERYTHROCYTE [DISTWIDTH] IN BLOOD BY AUTOMATED COUNT: 14 % (ref 11.5–14.5)
GLUCOSE BLD-MCNC: 115 MG/DL (ref 70–99)
GLUCOSE BLD-MCNC: 147 MG/DL (ref 70–99)
GLUCOSE BLD-MCNC: 194 MG/DL (ref 70–99)
GLUCOSE BLD-MCNC: 95 MG/DL (ref 70–99)
GLUCOSE SERPL-MCNC: 113 MG/DL (ref 70–99)
HCT VFR BLD AUTO: 37.1 % (ref 37–47)
HGB BLD-MCNC: 12.1 G/DL (ref 12–16)
LYMPHOCYTES # BLD: 3.4 K/UL (ref 1–4.8)
LYMPHOCYTES NFR BLD: 36.1 %
MAGNESIUM SERPL-MCNC: 1.9 MG/DL (ref 1.7–2.4)
MCH RBC QN AUTO: 29.9 PG (ref 27–31.3)
MCHC RBC AUTO-ENTMCNC: 32.6 % (ref 33–37)
MCV RBC AUTO: 91.6 FL (ref 79.4–94.8)
MONOCYTES # BLD: 0.9 K/UL (ref 0.2–0.8)
MONOCYTES NFR BLD: 9.1 %
NEUTROPHILS # BLD: 4.8 K/UL (ref 1.4–6.5)
NEUTS SEG NFR BLD: 50.7 %
PERFORMED ON: ABNORMAL
PERFORMED ON: NORMAL
PHOSPHATE SERPL-MCNC: 2.2 MG/DL (ref 2.3–4.8)
PLATELET # BLD AUTO: 267 K/UL (ref 130–400)
POTASSIUM SERPL-SCNC: 4.2 MEQ/L (ref 3.4–4.9)
RBC # BLD AUTO: 4.05 M/UL (ref 4.2–5.4)
SODIUM SERPL-SCNC: 142 MEQ/L (ref 135–144)
WBC # BLD AUTO: 9.4 K/UL (ref 4.8–10.8)

## 2024-10-03 PROCEDURE — 97116 GAIT TRAINING THERAPY: CPT

## 2024-10-03 PROCEDURE — G0378 HOSPITAL OBSERVATION PER HR: HCPCS

## 2024-10-03 PROCEDURE — 83735 ASSAY OF MAGNESIUM: CPT

## 2024-10-03 PROCEDURE — 96367 TX/PROPH/DG ADDL SEQ IV INF: CPT

## 2024-10-03 PROCEDURE — 2500000003 HC RX 250 WO HCPCS: Performed by: INTERNAL MEDICINE

## 2024-10-03 PROCEDURE — 80048 BASIC METABOLIC PNL TOTAL CA: CPT

## 2024-10-03 PROCEDURE — 96372 THER/PROPH/DIAG INJ SC/IM: CPT

## 2024-10-03 PROCEDURE — 97535 SELF CARE MNGMENT TRAINING: CPT

## 2024-10-03 PROCEDURE — 36415 COLL VENOUS BLD VENIPUNCTURE: CPT

## 2024-10-03 PROCEDURE — 6370000000 HC RX 637 (ALT 250 FOR IP): Performed by: INTERNAL MEDICINE

## 2024-10-03 PROCEDURE — 2580000003 HC RX 258: Performed by: INTERNAL MEDICINE

## 2024-10-03 PROCEDURE — 96368 THER/DIAG CONCURRENT INF: CPT

## 2024-10-03 PROCEDURE — 85025 COMPLETE CBC W/AUTO DIFF WBC: CPT

## 2024-10-03 PROCEDURE — 1210000000 HC MED SURG R&B

## 2024-10-03 PROCEDURE — 96366 THER/PROPH/DIAG IV INF ADDON: CPT

## 2024-10-03 PROCEDURE — 84100 ASSAY OF PHOSPHORUS: CPT

## 2024-10-03 PROCEDURE — 6360000002 HC RX W HCPCS: Performed by: INTERNAL MEDICINE

## 2024-10-03 RX ORDER — INSULIN LISPRO 100 [IU]/ML
3 INJECTION, SOLUTION INTRAVENOUS; SUBCUTANEOUS
Status: DISCONTINUED | OUTPATIENT
Start: 2024-10-03 | End: 2024-10-04 | Stop reason: HOSPADM

## 2024-10-03 RX ORDER — INSULIN GLARGINE 100 [IU]/ML
15 INJECTION, SOLUTION SUBCUTANEOUS NIGHTLY
Status: DISCONTINUED | OUTPATIENT
Start: 2024-10-03 | End: 2024-10-04

## 2024-10-03 RX ADMIN — SODIUM CHLORIDE, PRESERVATIVE FREE 10 ML: 5 INJECTION INTRAVENOUS at 20:55

## 2024-10-03 RX ADMIN — DICYCLOMINE HYDROCHLORIDE 10 MG: 10 CAPSULE ORAL at 11:52

## 2024-10-03 RX ADMIN — INSULIN LISPRO 3 UNITS: 100 INJECTION, SOLUTION INTRAVENOUS; SUBCUTANEOUS at 18:03

## 2024-10-03 RX ADMIN — INSULIN GLARGINE 15 UNITS: 100 INJECTION, SOLUTION SUBCUTANEOUS at 20:54

## 2024-10-03 RX ADMIN — DICYCLOMINE HYDROCHLORIDE 10 MG: 10 CAPSULE ORAL at 18:02

## 2024-10-03 RX ADMIN — METOPROLOL SUCCINATE 25 MG: 25 TABLET, EXTENDED RELEASE ORAL at 09:26

## 2024-10-03 RX ADMIN — DICYCLOMINE HYDROCHLORIDE 10 MG: 10 CAPSULE ORAL at 06:27

## 2024-10-03 RX ADMIN — BUSPIRONE HYDROCHLORIDE 5 MG: 10 TABLET ORAL at 09:26

## 2024-10-03 RX ADMIN — BUSPIRONE HYDROCHLORIDE 5 MG: 10 TABLET ORAL at 20:54

## 2024-10-03 RX ADMIN — INSULIN LISPRO 3 UNITS: 100 INJECTION, SOLUTION INTRAVENOUS; SUBCUTANEOUS at 13:57

## 2024-10-03 RX ADMIN — DICYCLOMINE HYDROCHLORIDE 10 MG: 10 CAPSULE ORAL at 20:54

## 2024-10-03 RX ADMIN — SODIUM PHOSPHATE, MONOBASIC, MONOHYDRATE AND SODIUM PHOSPHATE, DIBASIC, ANHYDROUS 10 MMOL: 276; 142 INJECTION, SOLUTION INTRAVENOUS at 09:42

## 2024-10-03 RX ADMIN — PREGABALIN 50 MG: 50 CAPSULE ORAL at 20:54

## 2024-10-03 RX ADMIN — CEFTRIAXONE SODIUM 1000 MG: 1 INJECTION, POWDER, FOR SOLUTION INTRAMUSCULAR; INTRAVENOUS at 09:34

## 2024-10-03 RX ADMIN — ATORVASTATIN CALCIUM 10 MG: 10 TABLET, FILM COATED ORAL at 20:54

## 2024-10-03 RX ADMIN — LISINOPRIL 20 MG: 20 TABLET ORAL at 09:27

## 2024-10-03 RX ADMIN — PIPERACILLIN AND TAZOBACTAM 3375 MG: 3; .375 INJECTION, POWDER, LYOPHILIZED, FOR SOLUTION INTRAVENOUS at 03:00

## 2024-10-03 RX ADMIN — LEVOTHYROXINE SODIUM 75 MCG: 0.07 TABLET ORAL at 06:27

## 2024-10-03 RX ADMIN — SODIUM CHLORIDE, PRESERVATIVE FREE 10 ML: 5 INJECTION INTRAVENOUS at 09:48

## 2024-10-03 RX ADMIN — Medication 400 MG: at 09:27

## 2024-10-03 RX ADMIN — AMITRIPTYLINE HYDROCHLORIDE 5 MG: 10 TABLET, FILM COATED ORAL at 20:54

## 2024-10-03 RX ADMIN — ACETAMINOPHEN 325MG 650 MG: 325 TABLET ORAL at 18:36

## 2024-10-03 RX ADMIN — Medication 2000 UNITS: at 09:30

## 2024-10-03 RX ADMIN — PREGABALIN 50 MG: 50 CAPSULE ORAL at 09:26

## 2024-10-03 RX ADMIN — INSULIN LISPRO 3 UNITS: 100 INJECTION, SOLUTION INTRAVENOUS; SUBCUTANEOUS at 09:47

## 2024-10-03 RX ADMIN — ENOXAPARIN SODIUM 40 MG: 100 INJECTION SUBCUTANEOUS at 09:30

## 2024-10-03 RX ADMIN — SODIUM CHLORIDE, PRESERVATIVE FREE 10 ML: 5 INJECTION INTRAVENOUS at 13:26

## 2024-10-03 ASSESSMENT — PAIN SCALES - GENERAL
PAINLEVEL_OUTOF10: 8
PAINLEVEL_OUTOF10: 2
PAINLEVEL_OUTOF10: 4
PAINLEVEL_OUTOF10: 3
PAINLEVEL_OUTOF10: 9
PAINLEVEL_OUTOF10: 4
PAINLEVEL_OUTOF10: 2

## 2024-10-03 ASSESSMENT — PAIN DESCRIPTION - LOCATION
LOCATION: HIP
LOCATION: BACK
LOCATION: HIP
LOCATION: HIP

## 2024-10-03 ASSESSMENT — PAIN DESCRIPTION - ORIENTATION
ORIENTATION: RIGHT

## 2024-10-03 ASSESSMENT — PAIN DESCRIPTION - DESCRIPTORS
DESCRIPTORS: ACHING;SHARP;POUNDING
DESCRIPTORS: ACHING;SORE
DESCRIPTORS: SHARP
DESCRIPTORS: SHOOTING

## 2024-10-03 ASSESSMENT — PAIN - FUNCTIONAL ASSESSMENT
PAIN_FUNCTIONAL_ASSESSMENT: PREVENTS OR INTERFERES WITH MANY ACTIVE NOT PASSIVE ACTIVITIES
PAIN_FUNCTIONAL_ASSESSMENT: PREVENTS OR INTERFERES SOME ACTIVE ACTIVITIES AND ADLS

## 2024-10-03 ASSESSMENT — PAIN SCALES - WONG BAKER: WONGBAKER_NUMERICALRESPONSE: NO HURT

## 2024-10-03 NOTE — PROGRESS NOTES
Physical Therapy Med Surg Daily Treatment Note  Facility/Department: [address] MED SURG UNIT  Room: [place] [address]       NAME: Gemini Molina  : 1960 (63 y.o.)  MRN: [id number]  CODE STATUS: Full Code    Date of Service: 10/3/2024    Patient Diagnosis(es): Lactic acidosis [E87.20]  Acute cystitis without hematuria [N30.00]  Diabetic ketoacidosis without coma associated with other specified diabetes mellitus (HCC) [E13.10]   Chief Complaint   Patient presents with    Extremity Weakness     Generalized weakness, [place] [facility]     Patient Active Problem List    Diagnosis Date Noted    Lactic acidosis 10/01/2024    Sepsis (HCC) 2024    Multiple fractures of ribs, right side, initial encounter for closed fracture 2024    Neuropathy 2023    Agitation due to dementia (HCC) 2023    Hypothyroidism 07/10/2023    Primary hypertension 07/10/2023    JACOB (acute kidney injury) (HCC) 07/10/2023    Weakness 07/10/2023    Acute cystitis with hematuria 07/10/2023    Uncontrolled type 2 diabetes mellitus with hyperglycemia, with long-term current use of insulin (HCC) 07/10/2023    Poorly controlled type 2 diabetes mellitus with neuropathy (HCC) 07/10/2023        Past Medical History:   Diagnosis Date    Acute cystitis with hematuria 07/10/2023    Acute pain due to trauma 2024    Agitation due to dementia (HCC) 2023    JACOB (acute kidney injury) (HCC) 07/10/2023    Fall 07/10/2023    Hypothyroidism 07/10/2023    IBS (irritable bowel syndrome)     Neuropathy 2023    Poorly controlled type 2 diabetes mellitus with neuropathy (HCC) 07/10/2023    Primary hypertension 07/10/2023    Uncontrolled type 2 diabetes mellitus with hyperglycemia, with long-term current use of insulin (HCC) 07/10/2023    Weakness 07/10/2023     History reviewed. No pertinent surgical history.    Chart Reviewed: Yes  Family / Caregiver Present: No    Restrictions:  Restrictions/Precautions: Fall  Risk    SUBJECTIVE:   Subjective: \"I live in assisted living, I don't leave my room.\"    Pain  Pain: pt denies pain at this time.    OBJECTIVE:        Bed mobility  Supine to Sit: Moderate assistance (increased time and effort to complete. pt raises HOB despite education to complete from flat bed d/t this being her setup at baseline. pt declines to attempt from flat bed.)  Sit to Supine:  (DNT pt into bedside chair to promote OOB activity.)  Bed Mobility Comments: much increased time to complete, easily distracted.    Transfers  Sit to Stand: Stand by assistance  Stand to Sit: Stand by assistance  Bed to Chair: Minimal assistance  Comment: pt uses WW to push, Min A needed for WW management and direction for safe approach to bedside chair.    Ambulation  Device: Standard Walker  Assistance: Stand by assistance  Quality of Gait: slow pace, short step length, heavy UE support  Distance: 8 feet - limited by fatigue  Comments: cues needed for managing standard walker and navigating tight spaces within room.                              Activity Tolerance  Activity Tolerance: Patient tolerated treatment well;Patient limited by fatigue          ASSESSMENT   Assessment: increased and effort for all tasks, pt needs assistance with bed mobility and needs cues for safe navigation of tight spaces within room. pt reports confidence returning to AL despite these safety concerns, even after education.     Discharge Recommendations:  Continue to assess pending progress         Goals  Long Term Goals  Long Term Goal 1: indep bed moblity  Long Term Goal 2: indep sit to stand  Long Term Goal 3: indep gait with ww 30 feet    PLAN    General Plan: 1 time a day 3-6 times a week  Safety Devices  Type of Devices: All fall risk precautions in place, Call light within reach, Chair alarm in place, Left in chair     AMPA (6 CLICK) BASIC MOBILITY  AM-PAC Inpatient Mobility Raw Score : 14      Therapy Time   Individual   Time In 0954   Time Out

## 2024-10-03 NOTE — PLAN OF CARE
Problem: Chronic Conditions and Co-morbidities  Goal: Patient's chronic conditions and co-morbidity symptoms are monitored and maintained or improved  Outcome: Progressing  Flowsheets (Taken 10/1/2024 2127 by Alyx Rubin RN)  Care Plan - Patient's Chronic Conditions and Co-Morbidity Symptoms are Monitored and Maintained or Improved:   Monitor and assess patient's chronic conditions and comorbid symptoms for stability, deterioration, or improvement   Collaborate with multidisciplinary team to address chronic and comorbid conditions and prevent exacerbation or deterioration   Update acute care plan with appropriate goals if chronic or comorbid symptoms are exacerbated and prevent overall improvement and discharge     Problem: Pain  Goal: Verbalizes/displays adequate comfort level or baseline comfort level  Flowsheets (Taken 10/3/2024 0341)  Verbalizes/displays adequate comfort level or baseline comfort level:   Encourage patient to monitor pain and request assistance   Assess pain using appropriate pain scale   Implement non-pharmacological measures as appropriate and evaluate response   Administer analgesics based on type and severity of pain and evaluate response     Problem: Safety - Adult  Goal: Free from fall injury  Outcome: Progressing  Flowsheets (Taken 10/3/2024 0341)  Free From Fall Injury:   Instruct family/caregiver on patient safety   Based on caregiver fall risk screen, instruct family/caregiver to ask for assistance with transferring infant if caregiver noted to have fall risk factors

## 2024-10-03 NOTE — DISCHARGE INSTR - COC
Continuity of Care Form    Patient Name: Gemini Molina   :  1960  MRN:  [id number]    Admit date:  10/1/2024  Discharge date:  10/4/24      Code Status Order: Full Code   Advance Directives:   Advance Care Flowsheet Documentation             Admitting Physician:  Michael Dwyer DO  PCP: Jhonatan Donovan MD    Discharging Nurse: Treva HAYES  Discharging Hospital Unit/Room#: W492/W492-01  Discharging Unit Phone Number: [phone]    Emergency Contact:   Extended Emergency Contact Information  Primary Emergency Contact: Ryan Molina Phone: [phone]  Relation: Child    Past Surgical History:  History reviewed. No pertinent surgical history.    Immunization History:   Immunization History   Administered Date(s) Administered    COVID-19, PFIZER Bivalent, DO NOT Dilute, (age 12y+), IM, 30 mcg/0.3 mL 2022    COVID-19, PFIZER GRAY top, DO NOT Dilute, (age 12 y+), IM, 30 mcg/0.3 mL 2022    COVID-19, PFIZER PURPLE top, DILUTE for use, (age 12 y+), 30mcg/0.3mL 2021, 2021, 10/28/2021    TDaP, ADACEL (age 10y-64y), BOOSTRIX (age 10y+), IM, 0.5mL 2024       Active Problems:  Patient Active Problem List   Diagnosis Code    Hypothyroidism E03.9    Primary hypertension I10    JACOB (acute kidney injury) ([facility]) N17.9    Weakness R53.1    Acute cystitis with hematuria N30.01    Uncontrolled type 2 diabetes mellitus with hyperglycemia, with long-term current use of insulin ([facility]) E11.65, Z79.4    Poorly controlled type 2 diabetes mellitus with neuropathy ([facility]) E11.40, E11.65    Neuropathy G62.9    Agitation due to dementia ([facility]) F03.911    Multiple fractures of ribs, right side, initial encounter for closed fracture S22.41XA    Sepsis ([facility]) A41.9    Lactic acidosis E87.20       Isolation/Infection:   Isolation            No Isolation          Patient Infection Status       None to display                     Nurse Assessment:  Last Vital Signs: BP (!) 152/89   Pulse 72   Temp 97.9 °F  ***    Readmission Risk Assessment Score:  Readmission Risk              Risk of Unplanned Readmission:  19           Discharging to Facility/ Agency   Name: [facility]   Address:  Phone: [phone]  Fax:      / signature: Electronically signed by SEBASTIAN Ramos on 10/4/24 at 10:32 AM EDT    PHYSICIAN SECTION    Prognosis: {Prognosis:3695566172}    Condition at Discharge: { Patient Condition:045306720}    Rehab Potential (if transferring to Rehab): {Prognosis:3541113978}    Recommended Labs or Other Treatments After Discharge: ***    Physician Certification: I certify the above information and transfer of Gemini Molina  is necessary for the continuing treatment of the diagnosis listed and that she requires {Admit to Appropriate Level of Care:78751} for {GREATER/LESS:873631618} 30 days.     Update Admission H&P: {CHP DME Changes in HandP:588040577}    PHYSICIAN SIGNATURE:  Electronically signed by Michael Dwyer DO on 10/3/24 at 6:49 AM EDT

## 2024-10-03 NOTE — PROGRESS NOTES
Arrived in pts room to reset her IV pump.  Pt had ambulated into the bed by herself. Her Call light and cell phone cord were completely wrapped around her. Her IV tubing wrapped around her. Pt was laying diagonally in the bed and needed assistance to straighten up and get into the bed properly.  I attempted to help the patient and told her that her cords were wrapped around and we needed to unwrap them. Pt stated \"hand me my phone.\" I explained it was tangled under her. Pt repeated \"hand me my phone, my son is going to call me.\" I told the patient that she had pulled her IV and there was blood and lines everywhere. Pt stated \"I don't have a mother, I will get into the bed and don't need a mother.\" Iv's were paused and I left the room.  POCT's followed to reposition in the bed. Once pt is in bed, I will restart the NS infusion.

## 2024-10-03 NOTE — PROGRESS NOTES
Physician Progress Note    10/3/2024   11:16 AM    Name:  Gemini Molina  MRN:    [id number]     IP Day: 2     Admit Date: 10/1/2024  2:37 PM  PCP: Jhonatan Donovan MD    Code Status:  Full Code    Subjective:     Feels better today.  About to work with therapy    Current Facility-Administered Medications   Medication Dose Route Frequency Provider Last Rate Last Admin    sodium phosphate 10 mmol in sodium chloride 0.9 % 250 mL IVPB  10 mmol IntraVENous Once Michael Dwyer  mL/hr at 10/03/24 0942 10 mmol at 10/03/24 0942    cefTRIAXone (ROCEPHIN) 1,000 mg in sodium chloride 0.9 % 50 mL IVPB (mini-bag)  1,000 mg IntraVENous Daily Michael Dwyer DO   Stopped at 10/03/24 1041    insulin glargine (LANTUS) injection vial 15 Units  15 Units SubCUTAneous Nightly Michael Dwyer R, DO        insulin lispro (HUMALOG,ADMELOG) injection vial 3 Units  3 Units SubCUTAneous TID WC Michael Dwyer DO   3 Units at 10/03/24 0947    magnesium oxide (MAG-OX) tablet 400 mg  400 mg Oral Daily Michael Dwyer DO   400 mg at 10/03/24 0927    amitriptyline (ELAVIL) tablet 5 mg  5 mg Oral Nightly Reymundo, Neal R, DO   5 mg at 10/02/24 2118    atorvastatin (LIPITOR) tablet 10 mg  10 mg Oral Nightly Reymundo, Neal R, DO   10 mg at 10/02/24 2118    busPIRone (BUSPAR) tablet 5 mg  5 mg Oral BID Michael Dwyer R, DO   5 mg at 10/03/24 0926    dicyclomine (BENTYL) capsule 10 mg  10 mg Oral 4x Daily AC & HS Michael Dwyer R DO   10 mg at 10/03/24 0627    levothyroxine (SYNTHROID) tablet 75 mcg  75 mcg Oral Daily Reymundo, Neal R, DO   75 mcg at 10/03/24 0627    lisinopril (PRINIVIL;ZESTRIL) tablet 20 mg  20 mg Oral Daily Michael Dwyer DO   20 mg at 10/03/24 0927    metoprolol succinate (TOPROL XL) extended release tablet 25 mg  25 mg Oral Daily Michael Dwyer DO   25 mg at 10/03/24 0926    pregabalin (LYRICA) capsule 50 mg  50 mg Oral BID Michael Dwyer DO   50 mg at 10/03/24 0926    Vitamin D (CHOLECALCIFEROL) tablet  bowel sounds present, no masses  Extremities: no edema, redness, tenderness in the calves. Cap refill <2s  Skin: no gross lesions, rashes    Data:     Labs:  Recent Labs     10/02/24  0612 10/03/24  0537   WBC 11.2* 9.4   HGB 11.8* 12.1    267     Recent Labs     10/02/24  0612 10/03/24  0537    142   K 3.7 4.2   * 113*   CO2 24 25   BUN 16 11   CREATININE 0.78 0.80   GLUCOSE 70 113*     Recent Labs     10/01/24  1500   AST 12   ALT 12   BILITOT 0.3   ALKPHOS 87       Assessment and Plan:        1.  Severe lactic acidosis suspect secondary to metformin use and dehydration: Resolved  -Discontinue metformin  -Treat underlying UTI     2.  UTI:  -De-escalate to ceftriaxone.  Follow culture.  -Stop Jardiance if urine cultures positive     3.  Type 2 diabetes with hyperglycemia:  -Adjust insulin regimen as needed     Diabetic neuropathy  Obesity  IBS     Lovenox prophylaxis  Full code    Plan for discharge 10/4.  Hoping patient will be able to return to assisted living.  If she does not do well with physical therapy, she may need to go to skilled nursing facility    Electronically signed by Michael Dwyer DO on 10/3/2024 at 11:16 AM

## 2024-10-03 NOTE — CARE COORDINATION
This GHANSHYAMW and MAYANK Mancini RN visited patient at bedside this afternoon. Discharge plans discussed. Patient is insisting that she is returning to her assisted living apt. At [facility] and will not be transferred to a SNF.  Electronically signed by SEBASTIAN Ramos on 10/3/24 at 3:53 PM EDT

## 2024-10-04 VITALS
BODY MASS INDEX: 34.97 KG/M2 | DIASTOLIC BLOOD PRESSURE: 52 MMHG | RESPIRATION RATE: 14 BRPM | TEMPERATURE: 99.1 F | HEIGHT: 67 IN | WEIGHT: 222.8 LBS | OXYGEN SATURATION: 100 % | HEART RATE: 73 BPM | SYSTOLIC BLOOD PRESSURE: 126 MMHG

## 2024-10-04 LAB
GLUCOSE BLD-MCNC: 180 MG/DL (ref 70–99)
GLUCOSE BLD-MCNC: 64 MG/DL (ref 70–99)
GLUCOSE BLD-MCNC: 92 MG/DL (ref 70–99)
PERFORMED ON: ABNORMAL
PERFORMED ON: ABNORMAL
PERFORMED ON: NORMAL

## 2024-10-04 PROCEDURE — 2580000003 HC RX 258: Performed by: INTERNAL MEDICINE

## 2024-10-04 PROCEDURE — 97535 SELF CARE MNGMENT TRAINING: CPT

## 2024-10-04 PROCEDURE — 96372 THER/PROPH/DIAG INJ SC/IM: CPT

## 2024-10-04 PROCEDURE — 6370000000 HC RX 637 (ALT 250 FOR IP): Performed by: INTERNAL MEDICINE

## 2024-10-04 PROCEDURE — 97116 GAIT TRAINING THERAPY: CPT

## 2024-10-04 PROCEDURE — 6360000002 HC RX W HCPCS: Performed by: INTERNAL MEDICINE

## 2024-10-04 PROCEDURE — G0378 HOSPITAL OBSERVATION PER HR: HCPCS

## 2024-10-04 PROCEDURE — 90792 PSYCH DIAG EVAL W/MED SRVCS: CPT | Performed by: PSYCHIATRY & NEUROLOGY

## 2024-10-04 RX ORDER — INSULIN GLARGINE 100 [IU]/ML
10 INJECTION, SOLUTION SUBCUTANEOUS NIGHTLY
Status: DISCONTINUED | OUTPATIENT
Start: 2024-10-04 | End: 2024-10-04 | Stop reason: HOSPADM

## 2024-10-04 RX ADMIN — SODIUM CHLORIDE, PRESERVATIVE FREE 10 ML: 5 INJECTION INTRAVENOUS at 08:26

## 2024-10-04 RX ADMIN — LISINOPRIL 20 MG: 20 TABLET ORAL at 08:25

## 2024-10-04 RX ADMIN — PREGABALIN 50 MG: 50 CAPSULE ORAL at 08:25

## 2024-10-04 RX ADMIN — ACETAMINOPHEN 325MG 650 MG: 325 TABLET ORAL at 05:30

## 2024-10-04 RX ADMIN — DICYCLOMINE HYDROCHLORIDE 10 MG: 10 CAPSULE ORAL at 05:30

## 2024-10-04 RX ADMIN — Medication 2000 UNITS: at 08:25

## 2024-10-04 RX ADMIN — BUSPIRONE HYDROCHLORIDE 5 MG: 10 TABLET ORAL at 08:26

## 2024-10-04 RX ADMIN — METOPROLOL SUCCINATE 25 MG: 25 TABLET, EXTENDED RELEASE ORAL at 08:26

## 2024-10-04 RX ADMIN — LEVOTHYROXINE SODIUM 75 MCG: 0.07 TABLET ORAL at 05:30

## 2024-10-04 RX ADMIN — ENOXAPARIN SODIUM 40 MG: 100 INJECTION SUBCUTANEOUS at 08:26

## 2024-10-04 RX ADMIN — Medication 400 MG: at 08:26

## 2024-10-04 ASSESSMENT — PAIN SCALES - GENERAL
PAINLEVEL_OUTOF10: 5
PAINLEVEL_OUTOF10: 2
PAINLEVEL_OUTOF10: 3

## 2024-10-04 ASSESSMENT — PAIN DESCRIPTION - ORIENTATION: ORIENTATION: RIGHT;LOWER

## 2024-10-04 ASSESSMENT — PAIN DESCRIPTION - LOCATION
LOCATION: BACK;HIP
LOCATION: HIP;BACK

## 2024-10-04 ASSESSMENT — PAIN DESCRIPTION - DESCRIPTORS: DESCRIPTORS: ACHING;SORE

## 2024-10-04 NOTE — CONSULTS
[facility] Department of Psychiatry  Behavioral Health Consult    REASON FOR CONSULT: Capacity eval    CONSULTING PHYSICIAN: Dr Dwyer    History obtained from: Pateint    HISTORY OF PRESENT ILLNESS:      The patient is a 63 y.o. female with significant past psychiatric history of depression  Was consulted for capacity assessment was requested by [facility]  Patient to live in assisted living facility there  Patient is able to understand the nature and purpose of the current treatment and the discharge plan  Patient is able to analyze the risk-benefit alternative  Patient is able to retain information given to her  Patient is able to make decision based on the available information  She does not have any depression symptoms currently  No anxiety symptoms  No SI or HI  No audiovisual hallucinations or paranoid thoughts       The patient is not currently receiving care for the above psychiatric illness.            Past Psychiatric History:  History of depression in the past    Past Medical History:        Diagnosis Date    Acute cystitis with hematuria 07/10/2023    Acute pain due to trauma 03/19/2024    Agitation due to dementia ([facility]) 07/18/2023    JACOB (acute kidney injury) ([facility]) 07/10/2023    Fall 07/10/2023    Hypothyroidism 07/10/2023    IBS (irritable bowel syndrome)     Neuropathy 07/18/2023    Poorly controlled type 2 diabetes mellitus with neuropathy ([facility]) 07/10/2023    Primary hypertension 07/10/2023    Uncontrolled type 2 diabetes mellitus with hyperglycemia, with long-term current use of insulin ([facility]) 07/10/2023    Weakness 07/10/2023       Past Surgical History:    History reviewed. No pertinent surgical history.    Medications Prior to Admission:   Medications Prior to Admission: HUMALOG KWIKPEN 100 UNIT/ML SOPN,   dicyclomine (BENTYL) 10 MG capsule, Take 1 capsule by mouth 4 times daily (before meals and nightly)  pregabalin (LYRICA) 50 MG capsule, Take 1 capsule by mouth 2  SOLN injection vial, Inject 2 Units into the skin See Admin Instructions Indications: Type 2 Diabetes Inject as per sliding scale :   if 201 - 250 = 2 units,   251 - 300= 3 units,  301 - 350 = 40 units,  351 - 400 = 5 units,  >401 call MD  subcu before meals    Allergies:  Codeine and Metformin and related    FAMILY/SOCIAL HISTORY:  History reviewed. No pertinent family history.  Social History     Socioeconomic History    Marital status:      Spouse name: Not on file    Number of children: Not on file    Years of education: Not on file    Highest education level: Not on file   Occupational History    Not on file   Tobacco Use    Smoking status: Never    Smokeless tobacco: Never   Substance and Sexual Activity    Alcohol use: Never    Drug use: Not on file    Sexual activity: Not on file   Other Topics Concern    Not on file   Social History Narrative    Not on file     Social Determinants of Health     Financial Resource Strain: Not on file   Food Insecurity: No Food Insecurity (10/1/2024)    Hunger Vital Sign     Worried About Running Out of Food in the Last Year: Never true     Ran Out of Food in the Last Year: Never true   Transportation Needs: No Transportation Needs (10/1/2024)    PRAPARE - Transportation     Lack of Transportation (Medical): No     Lack of Transportation (Non-Medical): No   Physical Activity: Not on file   Stress: Not on file   Social Connections: Not on file   Intimate Partner Violence: Not on file   Housing Stability: Low Risk  (10/1/2024)    Housing Stability Vital Sign     Unable to Pay for Housing in the Last Year: No     Number of Times Moved in the Last Year: 1     Homeless in the Last Year: No       REVIEW OF SYSTEMS    Constitutional: [] fever  [] chills  [] weight loss  []weakness [] Other:  Eyes:  [] photophobia  [] discharge [] acuity change   [] Diplopia   [] Other:  HENT:  [] sore throat  [] ear pain [] Tinnitus   [] Other  Respiratory:  [] Cough  [] Shortness of breath

## 2024-10-04 NOTE — PROGRESS NOTES
Physical Therapy Med Surg Daily Treatment Note  Facility/Department: [address] MED SURG UNIT  Room: [place] [address]       NAME: Gemini Molina  : 1960 (63 y.o.)  MRN: [id number]  CODE STATUS: Full Code    Date of Service: 10/4/2024    Patient Diagnosis(es): Lactic acidosis [E87.20]  Acute cystitis without hematuria [N30.00]  Diabetic ketoacidosis without coma associated with other specified diabetes mellitus (HCC) [E13.10]   Chief Complaint   Patient presents with    Extremity Weakness     Generalized weakness, [facility] fall [facility]     Patient Active Problem List    Diagnosis Date Noted    Lactic acidosis 10/01/2024    Sepsis (HCC) 2024    Multiple fractures of ribs, right side, initial encounter for closed fracture 2024    Neuropathy 2023    Agitation due to dementia (HCC) 2023    Hypothyroidism 07/10/2023    Primary hypertension 07/10/2023    JACOB (acute kidney injury) (HCC) 07/10/2023    Weakness 07/10/2023    Acute cystitis with hematuria 07/10/2023    Uncontrolled type 2 diabetes mellitus with hyperglycemia, with long-term current use of insulin (HCC) 07/10/2023    Poorly controlled type 2 diabetes mellitus with neuropathy (HCC) 07/10/2023        Past Medical History:   Diagnosis Date    Acute cystitis with hematuria 07/10/2023    Acute pain due to trauma 2024    Agitation due to dementia (HCC) 2023    JACOB (acute kidney injury) (HCC) 07/10/2023    Fall 07/10/2023    Hypothyroidism 07/10/2023    IBS (irritable bowel syndrome)     Neuropathy 2023    Poorly controlled type 2 diabetes mellitus with neuropathy (HCC) 07/10/2023    Primary hypertension 07/10/2023    Uncontrolled type 2 diabetes mellitus with hyperglycemia, with long-term current use of insulin (HCC) 07/10/2023    Weakness 07/10/2023     History reviewed. No pertinent surgical history.    Chart Reviewed: Yes  Family / Caregiver Present: No    Restrictions:  Restrictions/Precautions: Fall  Risk    SUBJECTIVE:   Subjective: \"I want to return to assisted living.\"    Pain  Pain: 2/10 R side pain pre/post tx.    OBJECTIVE:        Bed mobility  Supine to Sit: Stand by assistance (significant time to complete, >10 minutes. pt utilizing bed controls, completes from near flat bed.)  Sit to Supine:  (DNT pt into bedside chair to promote OOB activity.)  Bed Mobility Comments: vc's for improved sequencing/technique.    Transfers  Sit to Stand: Stand by assistance  Stand to Sit: Stand by assistance  Bed to Chair: Minimal assistance  Comment: poor hand placement, vc's to correct, increased time to complete. poor safety awareness with approach to sitting surface.    Ambulation  Surface: Level tile  Device: Standard Walker  Assistance: Stand by assistance  Quality of Gait: slow pace, short step length, heavy UE support  Distance: 40'  Comments: cues needed for managing standard walker and navigating tight spaces within room. increased time to complete.         Activity Tolerance  Activity Tolerance: Patient tolerated treatment well          ASSESSMENT   Assessment: increased and effort for all tasks, pt able to complete tasks at SBA level but needs close guarding d/t fatigue levels, decreased safety awareness, and increased time to complete tasks. pt educated on her current deficits and concerns with returning to AL where she needs to be independent for mobility.     Discharge Recommendations:  Continue to assess pending progress         Goals  Long Term Goals  Long Term Goal 1: indep bed moblity  Long Term Goal 2: indep sit to stand  Long Term Goal 3: indep gait with ww 30 feet    PLAN    General Plan: 1 time a day 3-6 times a week  Safety Devices  Type of Devices: All fall risk precautions in place, Call light within reach, Chair alarm in place, Left in chair     AMPA (6 CLICK) BASIC MOBILITY  AM-PAC Inpatient Mobility Raw Score : 17      Therapy Time   Individual   Time In 0846   Time Out 0909   Minutes 23       BM/trsf: 13  Gait: 10       Kiko GarciaCOLT, 10/04/24 at 11:53 AM         Definitions for assistance levels  Independent = pt does not require any physical supervision or assistance from another person for activity completion. Device may be needed.  Stand by assistance = pt requires verbal cues or instructions from another person, close to but not touching, to perform the activity  Minimal assistance= pt performs 75% or more of the activity; assistance is required to complete the activity  Moderate assistance= pt performs 50% of the activity; assistance is required to complete the activity  Maximal assistance = pt performs 25% of the activity; assistance is required to complete the activity  Dependent = pt requires total physical assistance to accomplish the task

## 2024-10-04 NOTE — DISCHARGE SUMMARY
[facility] Hospital Medicine Discharge Summary    Gemini Molina  :  1960  MRN:  [id number]    Admit date:  10/1/2024    Discharge date:  10/4/2024    Admitting Physician:  Michael Dwyer DO  Primary Care Physician:  Jhonatan Donovan MD    Discharge Diagnoses:    Principal Problem:    Lactic acidosis  Resolved Problems:    * No resolved hospital problems. *    Chief Complaint   Patient presents with    Extremity Weakness     Generalized weakness, near fall [facility]     Condition: improved   Activity: no restrictions   Diet: diabetic  Disposition: Assisted Living  Functional Status: ambulatory with assistance    Significant Findings:         Latest Ref Rng & Units 10/3/2024     5:37 AM 10/2/2024     6:12 AM 10/1/2024     3:00 PM 2024     5:36 AM 9/3/2024     5:52 AM   Labs Renal   BUN 8 - 23 mg/dL 11  16  24  10  13    Cr 0.50 - 0.90 mg/dL 0.80  0.78  1.12  0.74  0.82    K 3.4 - 4.9 mEq/L 4.2  3.7  4.1  3.7  3.8    Na 135 - 144 mEq/L 142  142  140  138  140      Lactic acid 9.7 on admission      Hospital Course:   63-year-old female with type 2 diabetes, diabetic neuropathy, IBS, hospitalization - for UTI presents from her assisted living facility after experiencing a fall while in the shower.  In the ED, she was found to have hyperglycemia, lactic acidosis (10), and pyuria suggestive of possible UTI.  She is maintained on empiric antibiotics however no infection was identified.  She was treated with IV fluid with resolution of her lactic acidosis and her metformin will be discontinued upon discharge.    The assisted living facility where she resides requested psychiatric evaluation for capacity.  Upon my evaluation, the patient is oriented x 3 and to situation.        Exam on Discharge:   BP (!) 126/52   Pulse 73   Temp 99.1 °F (37.3 °C) (Oral)   Resp 14   Ht 1.702 m (5' 7\")   Wt 101.1 kg (222 lb 12.8 oz)   SpO2 100%   BMI 34.90 kg/m²   General appearance: alert,  minutes    Signed:  Michael Dwyer DO  10/4/2024, 9:29 AM

## 2024-10-04 NOTE — PLAN OF CARE
Problem: Chronic Conditions and Co-morbidities  Goal: Patient's chronic conditions and co-morbidity symptoms are monitored and maintained or improved  Outcome: Progressing  Flowsheets (Taken 10/4/2024 0822)  Care Plan - Patient's Chronic Conditions and Co-Morbidity Symptoms are Monitored and Maintained or Improved:   Monitor and assess patient's chronic conditions and comorbid symptoms for stability, deterioration, or improvement   Collaborate with multidisciplinary team to address chronic and comorbid conditions and prevent exacerbation or deterioration   Update acute care plan with appropriate goals if chronic or comorbid symptoms are exacerbated and prevent overall improvement and discharge     Problem: Discharge Planning  Goal: Discharge to home or other facility with appropriate resources  Outcome: Progressing  Flowsheets (Taken 10/4/2024 0822)  Discharge to home or other facility with appropriate resources:   Identify barriers to discharge with patient and caregiver   Arrange for needed discharge resources and transportation as appropriate   Identify discharge learning needs (meds, wound care, etc)     Problem: Pain  Goal: Verbalizes/displays adequate comfort level or baseline comfort level  10/4/2024 0933 by Treva Warren RN  Outcome: Progressing  10/4/2024 0458 by Miya Edgar RN  Outcome: Progressing  Flowsheets (Taken 10/3/2024 0341)  Verbalizes/displays adequate comfort level or baseline comfort level:   Encourage patient to monitor pain and request assistance   Assess pain using appropriate pain scale   Implement non-pharmacological measures as appropriate and evaluate response   Administer analgesics based on type and severity of pain and evaluate response     Problem: Safety - Adult  Goal: Free from fall injury  10/4/2024 0933 by Treva Warren RN  Outcome: Progressing  10/4/2024 0458 by Miya Edgar RN  Outcome: Progressing  Flowsheets (Taken 10/3/2024 0341)  Free From Fall  Injury:   Instruct family/caregiver on patient safety   Based on caregiver fall risk screen, instruct family/caregiver to ask for assistance with transferring infant if caregiver noted to have fall risk factors     Problem: Skin/Tissue Integrity  Goal: Absence of new skin breakdown  Description: 1.  Monitor for areas of redness and/or skin breakdown  2.  Assess vascular access sites hourly  3.  Every 4-6 hours minimum:  Change oxygen saturation probe site  4.  Every 4-6 hours:  If on nasal continuous positive airway pressure, respiratory therapy assess nares and determine need for appliance change or resting period.  10/4/2024 0933 by Treva Warren, RN  Outcome: Progressing  10/4/2024 0458 by Miya Edgar, RN  Outcome: Progressing

## 2024-10-04 NOTE — PLAN OF CARE
Problem: Pain  Goal: Verbalizes/displays adequate comfort level or baseline comfort level  Outcome: Progressing  Flowsheets (Taken 10/3/2024 0341)  Verbalizes/displays adequate comfort level or baseline comfort level:   Encourage patient to monitor pain and request assistance   Assess pain using appropriate pain scale   Implement non-pharmacological measures as appropriate and evaluate response   Administer analgesics based on type and severity of pain and evaluate response     Problem: Safety - Adult  Goal: Free from fall injury  Outcome: Progressing  Flowsheets (Taken 10/3/2024 0341)  Free From Fall Injury:   Instruct family/caregiver on patient safety   Based on caregiver fall risk screen, instruct family/caregiver to ask for assistance with transferring infant if caregiver noted to have fall risk factors     Problem: Skin/Tissue Integrity  Goal: Absence of new skin breakdown  Description: 1.  Monitor for areas of redness and/or skin breakdown  2.  Assess vascular access sites hourly  3.  Every 4-6 hours minimum:  Change oxygen saturation probe site  4.  Every 4-6 hours:  If on nasal continuous positive airway pressure, respiratory therapy assess nares and determine need for appliance change or resting period.  Outcome: Progressing

## 2024-10-04 NOTE — CARE COORDINATION
This GHANSHYAMW visited patient at bedside this am. Patient to be evaluated by Dr. Malloy. If patient is deemed to have capacity patient to be discharged to [facility] apt.   Electronically signed by SEBASTIAN Ramos on 10/4/24 at 10:23 AM EDT   I completed 2nd IMM with patient , copy provided.  Electronically signed by SEBASTIAN Ramos on 10/4/24 at 10:23 AM EDT   Patient has been evaluated by Dr. Malloy and deemed to have capacity.  Patient will return to [facility] apt. today, 10/4/24.  Patient will be transported to [facility] via [facility]. Transport is scheduled for: 12:00 PM. Patient, Kiara,liaison are aware.  Electronically signed by SEBASTIAN Ramos on 10/4/24 at 11:13 AM EDT

## 2024-10-06 LAB — BACTERIA BLD CULT ORG #2: NORMAL

## 2024-10-07 NOTE — PROGRESS NOTES
Physical Therapy  Facility/Department: [facility] MED SURG W492/W492-01  Physical Therapy Discharge      NAME: Gemini Molina    : 1960 (63 y.o.)  MRN: [id number]    Account: [id number]  Gender: female      Patient has been discharged from [facility] hospital. DC patient from current PT program.      Electronically signed by Concha Rizzo PT on 10/7/24 at 4:38 PM EDT

## 2024-10-11 ENCOUNTER — APPOINTMENT (OUTPATIENT)
Dept: GENERAL RADIOLOGY | Age: 64
End: 2024-10-11
Payer: MEDICARE

## 2024-10-11 ENCOUNTER — APPOINTMENT (OUTPATIENT)
Dept: CT IMAGING | Age: 64
End: 2024-10-11
Payer: MEDICARE

## 2024-10-11 ENCOUNTER — HOSPITAL ENCOUNTER (EMERGENCY)
Age: 64
Discharge: HOME | End: 2024-10-11
Payer: MEDICARE

## 2024-10-11 VITALS
HEART RATE: 81 BPM | OXYGEN SATURATION: 100 % | RESPIRATION RATE: 16 BRPM | WEIGHT: 220 LBS | DIASTOLIC BLOOD PRESSURE: 71 MMHG | HEIGHT: 67 IN | TEMPERATURE: 97.8 F | SYSTOLIC BLOOD PRESSURE: 98 MMHG | BODY MASS INDEX: 34.53 KG/M2

## 2024-10-11 DIAGNOSIS — R19.7 DIARRHEA, UNSPECIFIED TYPE: ICD-10-CM

## 2024-10-11 DIAGNOSIS — S80.00XA CONTUSION OF KNEE, UNSPECIFIED LATERALITY, INITIAL ENCOUNTER: Primary | ICD-10-CM

## 2024-10-11 LAB
ALBUMIN SERPL-MCNC: 3.2 G/DL (ref 3.5–4.6)
ALP SERPL-CCNC: 83 U/L (ref 40–130)
ALT SERPL-CCNC: 11 U/L (ref 0–33)
ANION GAP SERPL CALCULATED.3IONS-SCNC: 11 MEQ/L (ref 9–15)
APTT PPP: 24.7 SEC (ref 24.4–36.8)
AST SERPL-CCNC: 12 U/L (ref 0–35)
BASOPHILS # BLD: 0 K/UL (ref 0–0.2)
BASOPHILS NFR BLD: 0.1 %
BILIRUB SERPL-MCNC: 0.3 MG/DL (ref 0.2–0.7)
BUN SERPL-MCNC: 15 MG/DL (ref 8–23)
CALCIUM SERPL-MCNC: 8.6 MG/DL (ref 8.5–9.9)
CHLORIDE SERPL-SCNC: 106 MEQ/L (ref 95–107)
CO2 SERPL-SCNC: 25 MEQ/L (ref 20–31)
CREAT SERPL-MCNC: 0.83 MG/DL (ref 0.5–0.9)
EOSINOPHIL # BLD: 0.1 K/UL (ref 0–0.7)
EOSINOPHIL NFR BLD: 0.5 %
ERYTHROCYTE [DISTWIDTH] IN BLOOD BY AUTOMATED COUNT: 14.3 % (ref 11.5–14.5)
ETHANOL PERCENT: NORMAL G/DL
ETHANOLAMINE SERPL-MCNC: <10 MG/DL (ref 0–0.08)
GLOBULIN SER CALC-MCNC: 3 G/DL (ref 2.3–3.5)
GLUCOSE SERPL-MCNC: 258 MG/DL (ref 70–99)
HCT VFR BLD AUTO: 41.1 % (ref 37–47)
HGB BLD-MCNC: 13.3 G/DL (ref 12–16)
INR PPP: 1
LYMPHOCYTES # BLD: 1.5 K/UL (ref 1–4.8)
LYMPHOCYTES NFR BLD: 10.9 %
MCH RBC QN AUTO: 29.8 PG (ref 27–31.3)
MCHC RBC AUTO-ENTMCNC: 32.4 % (ref 33–37)
MCV RBC AUTO: 91.9 FL (ref 79.4–94.8)
MONOCYTES # BLD: 1 K/UL (ref 0.2–0.8)
MONOCYTES NFR BLD: 7.2 %
NEUTROPHILS # BLD: 10.8 K/UL (ref 1.4–6.5)
NEUTS SEG NFR BLD: 80.8 %
PLATELET # BLD AUTO: 364 K/UL (ref 130–400)
POTASSIUM SERPL-SCNC: 4.5 MEQ/L (ref 3.4–4.9)
PROT SERPL-MCNC: 6.2 G/DL (ref 6.3–8)
PROTHROMBIN TIME: 13.1 SEC (ref 12.3–14.9)
RBC # BLD AUTO: 4.47 M/UL (ref 4.2–5.4)
SODIUM SERPL-SCNC: 142 MEQ/L (ref 135–144)
WBC # BLD AUTO: 13.4 K/UL (ref 4.8–10.8)

## 2024-10-11 PROCEDURE — 85610 PROTHROMBIN TIME: CPT

## 2024-10-11 PROCEDURE — 85730 THROMBOPLASTIN TIME PARTIAL: CPT

## 2024-10-11 PROCEDURE — 72125 CT NECK SPINE W/O DYE: CPT

## 2024-10-11 PROCEDURE — 85025 COMPLETE CBC W/AUTO DIFF WBC: CPT

## 2024-10-11 PROCEDURE — 70450 CT HEAD/BRAIN W/O DYE: CPT

## 2024-10-11 PROCEDURE — 80053 COMPREHEN METABOLIC PANEL: CPT

## 2024-10-11 PROCEDURE — 99284 EMERGENCY DEPT VISIT MOD MDM: CPT

## 2024-10-11 PROCEDURE — 96375 TX/PRO/DX INJ NEW DRUG ADDON: CPT

## 2024-10-11 PROCEDURE — 82077 ASSAY SPEC XCP UR&BREATH IA: CPT

## 2024-10-11 PROCEDURE — 36415 COLL VENOUS BLD VENIPUNCTURE: CPT

## 2024-10-11 PROCEDURE — 96372 THER/PROPH/DIAG INJ SC/IM: CPT

## 2024-10-11 PROCEDURE — 6360000002 HC RX W HCPCS: Performed by: PHYSICIAN ASSISTANT

## 2024-10-11 PROCEDURE — 73560 X-RAY EXAM OF KNEE 1 OR 2: CPT

## 2024-10-11 PROCEDURE — 96374 THER/PROPH/DIAG INJ IV PUSH: CPT

## 2024-10-11 PROCEDURE — 6360000002 HC RX W HCPCS

## 2024-10-11 RX ORDER — DICYCLOMINE HYDROCHLORIDE 10 MG/ML
20 INJECTION INTRAMUSCULAR ONCE
Status: COMPLETED | OUTPATIENT
Start: 2024-10-11 | End: 2024-10-11

## 2024-10-11 RX ORDER — ACETAMINOPHEN 325 MG/1
650 TABLET ORAL EVERY 6 HOURS PRN
Qty: 40 TABLET | Refills: 0 | Status: SHIPPED | OUTPATIENT
Start: 2024-10-11

## 2024-10-11 RX ORDER — ONDANSETRON 2 MG/ML
4 INJECTION INTRAMUSCULAR; INTRAVENOUS ONCE
Status: COMPLETED | OUTPATIENT
Start: 2024-10-11 | End: 2024-10-11

## 2024-10-11 RX ORDER — DICYCLOMINE HCL 20 MG
20 TABLET ORAL EVERY 6 HOURS PRN
Qty: 20 TABLET | Refills: 0 | Status: SHIPPED | OUTPATIENT
Start: 2024-10-11

## 2024-10-11 RX ORDER — DICYCLOMINE HCL 20 MG
20 TABLET ORAL EVERY 6 HOURS PRN
Qty: 20 TABLET | Refills: 0 | Status: SHIPPED | OUTPATIENT
Start: 2024-10-11 | End: 2024-10-11

## 2024-10-11 RX ORDER — FENTANYL CITRATE 0.05 MG/ML
25 INJECTION, SOLUTION INTRAMUSCULAR; INTRAVENOUS ONCE
Status: COMPLETED | OUTPATIENT
Start: 2024-10-11 | End: 2024-10-11

## 2024-10-11 RX ORDER — ACETAMINOPHEN 325 MG/1
650 TABLET ORAL EVERY 6 HOURS PRN
Qty: 40 TABLET | Refills: 0 | Status: SHIPPED | OUTPATIENT
Start: 2024-10-11 | End: 2024-10-11

## 2024-10-11 RX ADMIN — FENTANYL CITRATE 25 MCG: 0.05 INJECTION, SOLUTION INTRAMUSCULAR; INTRAVENOUS at 15:20

## 2024-10-11 RX ADMIN — ONDANSETRON 4 MG: 2 INJECTION INTRAMUSCULAR; INTRAVENOUS at 15:20

## 2024-10-11 RX ADMIN — DICYCLOMINE HYDROCHLORIDE 20 MG: 10 INJECTION, SOLUTION INTRAMUSCULAR at 16:45

## 2024-10-11 ASSESSMENT — ENCOUNTER SYMPTOMS
BACK PAIN: 0
ABDOMINAL PAIN: 0
SHORTNESS OF BREATH: 0

## 2024-10-11 ASSESSMENT — PAIN DESCRIPTION - LOCATION: LOCATION: KNEE

## 2024-10-11 ASSESSMENT — PAIN - FUNCTIONAL ASSESSMENT: PAIN_FUNCTIONAL_ASSESSMENT: 0-10

## 2024-10-11 ASSESSMENT — PAIN DESCRIPTION - ORIENTATION: ORIENTATION: LEFT;RIGHT

## 2024-10-11 ASSESSMENT — PAIN SCALES - GENERAL: PAINLEVEL_OUTOF10: 10

## 2024-10-11 NOTE — ED PROVIDER NOTES
[facility] ED  eMERGENCYdEPARTMENT eNCOUnter        Pt Name: Gemiin Molina  MRN: [id number]  Birthdate 1960of evaluation: 10/11/2024  Provider:Lanre Woods PA-C  3:19 PM EDT    CHIEF COMPLAINT       Chief Complaint   Patient presents with    Fall         HISTORY OF PRESENT ILLNESS  (Location/Symptom, Timing/Onset, Context/Setting, Quality, Duration, Modifying Factors, Severity.)   Gemini Molina is a 63 y.o. female who presents to the emergency department following a fall at assisted living prior to arrival. Patient was walking with an aid and stumbled, falling forward onto bilateral knees. Patient initially denied hitting her head or any loc, but later states she has a headache and feels dizzy, and is unsure if she hit her head. C/O generalized body pain, worst in the knees and thighs     Patient also complains of diarrhea second to her \"IBS\"        HPI    Nursing Notes were reviewed and I agree.    REVIEW OF SYSTEMS    (2-9 systems for level 4, 10 or more for level 5)     Review of Systems   Respiratory:  Negative for shortness of breath.    Cardiovascular:  Negative for chest pain.   Gastrointestinal:  Negative for abdominal pain.   Musculoskeletal:  Positive for arthralgias and gait problem. Negative for back pain and neck pain.   Neurological:  Positive for dizziness and headaches. Negative for syncope.   All other systems reviewed and are negative.       as noted above the remainder of the review of systems was reviewed and negative.       PAST MEDICAL HISTORY     Past Medical History:   Diagnosis Date    Acute cystitis with hematuria 07/10/2023    Acute pain due to trauma 03/19/2024    Agitation due to dementia ([facility]) 07/18/2023    JACOB (acute kidney injury) ([facility]) 07/10/2023    Fall 07/10/2023    Hypothyroidism 07/10/2023    IBS (irritable bowel syndrome)     Neuropathy 07/18/2023    Poorly controlled type 2 diabetes mellitus with neuropathy ([facility]) 07/10/2023    Primary hypertension  DIAGNOSIS/MDM:   Vitals:    Vitals:    10/11/24 1441   BP: (!) 140/73   Pulse: 82   Resp: 23   Temp: 97.8 °F (36.6 °C)   TempSrc: Oral   SpO2: 100%   Weight: 99.8 kg (220 lb)   Height: 1.702 m (5' 7\")       REASSESSMENT        Patient presented the emergency department following a fall at assisted living prior to arrival.  Affected areas including bilateral knees were x-rayed and negative.  Patient had been complaining of bilateral thigh pain but refused the femur x-rays and physical exam showed no gross deformities or abnormalities in through these areas.  It is unclear whether the patient had hit her head or not so CT of the head and cervical spine were obtained and negative.  Laboratory testing unremarkable.  Patient will be discharged back to assisted living with prescription for Bentyl given her recent diarrhea and IBS exacerbation.    Medical Decision Making  Amount and/or Complexity of Data Reviewed  Labs: ordered.  Radiology: ordered.    Risk  Prescription drug management.       Coding     PROCEDURES:    Procedures      FINAL IMPRESSION      1. Contusion of knee, unspecified laterality, initial encounter    2. Diarrhea, unspecified type          DISPOSITION/PLAN   DISPOSITION Discharge - Pending Orders Complete 10/11/2024 04:28:01 PM      PATIENT REFERRED TO:  Jhonatan Donovan MD  [address] [place] [address]  [phone]    Call in 2 days        DISCHARGE MEDICATIONS:  New Prescriptions    ACETAMINOPHEN (AMINOFEN) 325 MG TABLET    Take 2 tablets by mouth every 6 hours as needed for Pain    DICYCLOMINE (BENTYL) 20 MG TABLET    Take 1 tablet by mouth every 6 hours as needed (abdominal pain)       (Please note that portions of this note were completed with a voice recognition program.  Efforts were made to edit the dictations but occasionally words are mis-transcribed.)    Lanre Woods PA-C    Supervising Physician Lanre Ramires PA-C  10/11/24 9304

## 2024-10-11 NOTE — ED TRIAGE NOTES
Pt presents to ER via squad from [facility].  Conscious, Aox4.  C/o: Fall.  Pt states she was leaving the bathroom in her room at [facility].  Pt states she's not sure why but her left leg went down to the ground, and she went to her knees unintentionally.  Pt states 10/10 pain at both knees.  Pt denies head/neck/back pain.  Pt poor historian and is unable to say if she hit her head or not, she \"might\" feel dizzy, doesn't know.  Pt states she remembers the fall.

## 2024-10-23 ENCOUNTER — HOSPITAL ENCOUNTER (EMERGENCY)
Age: 64
Discharge: HOME | End: 2024-10-24
Payer: MEDICARE

## 2024-10-23 ENCOUNTER — APPOINTMENT (OUTPATIENT)
Dept: CT IMAGING | Age: 64
End: 2024-10-23
Payer: MEDICARE

## 2024-10-23 DIAGNOSIS — E11.65 TYPE 2 DIABETES MELLITUS WITH HYPERGLYCEMIA, UNSPECIFIED WHETHER LONG TERM INSULIN USE (HCC): ICD-10-CM

## 2024-10-23 DIAGNOSIS — K59.00 CONSTIPATION, UNSPECIFIED CONSTIPATION TYPE: ICD-10-CM

## 2024-10-23 DIAGNOSIS — L30.9 DERMATITIS: ICD-10-CM

## 2024-10-23 DIAGNOSIS — B37.89 CANDIDA RASH OF GROIN: ICD-10-CM

## 2024-10-23 DIAGNOSIS — R10.9 ABDOMINAL PAIN, UNSPECIFIED ABDOMINAL LOCATION: ICD-10-CM

## 2024-10-23 DIAGNOSIS — K56.41 FECAL IMPACTION (HCC): Primary | ICD-10-CM

## 2024-10-23 LAB
BASOPHILS # BLD: 0 K/UL (ref 0–0.2)
BASOPHILS NFR BLD: 0.2 %
EOSINOPHIL # BLD: 0.1 K/UL (ref 0–0.7)
EOSINOPHIL NFR BLD: 0.6 %
ERYTHROCYTE [DISTWIDTH] IN BLOOD BY AUTOMATED COUNT: 13.9 % (ref 11.5–14.5)
HCT VFR BLD AUTO: 40.9 % (ref 37–47)
HGB BLD-MCNC: 13 G/DL (ref 12–16)
LACTATE BLDV-SCNC: 1.6 MMOL/L (ref 0.5–2.2)
LYMPHOCYTES # BLD: 1.6 K/UL (ref 1–4.8)
LYMPHOCYTES NFR BLD: 16.8 %
MCH RBC QN AUTO: 28.6 PG (ref 27–31.3)
MCHC RBC AUTO-ENTMCNC: 31.8 % (ref 33–37)
MCV RBC AUTO: 89.9 FL (ref 79.4–94.8)
MONOCYTES # BLD: 0.9 K/UL (ref 0.2–0.8)
MONOCYTES NFR BLD: 9.2 %
NEUTROPHILS # BLD: 7.1 K/UL (ref 1.4–6.5)
NEUTS SEG NFR BLD: 73 %
PLATELET # BLD AUTO: 332 K/UL (ref 130–400)
POC CREATININE WHOLE BLOOD: 0.9
PROCALCITONIN SERPL IA-MCNC: 0.09 NG/ML (ref 0–0.15)
RBC # BLD AUTO: 4.55 M/UL (ref 4.2–5.4)
WBC # BLD AUTO: 9.8 K/UL (ref 4.8–10.8)

## 2024-10-23 PROCEDURE — 83605 ASSAY OF LACTIC ACID: CPT

## 2024-10-23 PROCEDURE — 84145 PROCALCITONIN (PCT): CPT

## 2024-10-23 PROCEDURE — 6360000004 HC RX CONTRAST MEDICATION: Performed by: PHYSICIAN ASSISTANT

## 2024-10-23 PROCEDURE — 96374 THER/PROPH/DIAG INJ IV PUSH: CPT

## 2024-10-23 PROCEDURE — 96375 TX/PRO/DX INJ NEW DRUG ADDON: CPT

## 2024-10-23 PROCEDURE — 6360000002 HC RX W HCPCS: Performed by: PHYSICIAN ASSISTANT

## 2024-10-23 PROCEDURE — 74177 CT ABD & PELVIS W/CONTRAST: CPT

## 2024-10-23 PROCEDURE — 96372 THER/PROPH/DIAG INJ SC/IM: CPT

## 2024-10-23 PROCEDURE — 85025 COMPLETE CBC W/AUTO DIFF WBC: CPT

## 2024-10-23 PROCEDURE — 99285 EMERGENCY DEPT VISIT HI MDM: CPT

## 2024-10-23 RX ORDER — IOPAMIDOL 755 MG/ML
75 INJECTION, SOLUTION INTRAVASCULAR
Status: COMPLETED | OUTPATIENT
Start: 2024-10-23 | End: 2024-10-23

## 2024-10-23 RX ORDER — FENTANYL CITRATE 0.05 MG/ML
25 INJECTION, SOLUTION INTRAMUSCULAR; INTRAVENOUS ONCE
Status: COMPLETED | OUTPATIENT
Start: 2024-10-23 | End: 2024-10-23

## 2024-10-23 RX ORDER — ONDANSETRON 2 MG/ML
4 INJECTION INTRAMUSCULAR; INTRAVENOUS ONCE
Status: COMPLETED | OUTPATIENT
Start: 2024-10-23 | End: 2024-10-23

## 2024-10-23 RX ORDER — DICYCLOMINE HYDROCHLORIDE 10 MG/ML
20 INJECTION INTRAMUSCULAR ONCE
Status: COMPLETED | OUTPATIENT
Start: 2024-10-23 | End: 2024-10-23

## 2024-10-23 RX ADMIN — FENTANYL CITRATE 25 MCG: 0.05 INJECTION, SOLUTION INTRAMUSCULAR; INTRAVENOUS at 21:38

## 2024-10-23 RX ADMIN — IOPAMIDOL 75 ML: 755 INJECTION, SOLUTION INTRAVENOUS at 22:46

## 2024-10-23 RX ADMIN — ONDANSETRON 4 MG: 2 INJECTION, SOLUTION INTRAMUSCULAR; INTRAVENOUS at 21:37

## 2024-10-23 RX ADMIN — DICYCLOMINE HYDROCHLORIDE 20 MG: 10 INJECTION, SOLUTION INTRAMUSCULAR at 21:39

## 2024-10-23 ASSESSMENT — ENCOUNTER SYMPTOMS
ABDOMINAL PAIN: 1
COLOR CHANGE: 1
BACK PAIN: 0
VOICE CHANGE: 0
NAUSEA: 1
ABDOMINAL DISTENTION: 0
EYE DISCHARGE: 0
BLOOD IN STOOL: 1
ANAL BLEEDING: 0
VOMITING: 1

## 2024-10-23 ASSESSMENT — PAIN DESCRIPTION - LOCATION: LOCATION: ABDOMEN

## 2024-10-23 ASSESSMENT — PAIN SCALES - GENERAL: PAINLEVEL_OUTOF10: 9

## 2024-10-24 VITALS
SYSTOLIC BLOOD PRESSURE: 129 MMHG | HEART RATE: 64 BPM | OXYGEN SATURATION: 100 % | RESPIRATION RATE: 18 BRPM | DIASTOLIC BLOOD PRESSURE: 64 MMHG | TEMPERATURE: 98.2 F

## 2024-10-24 LAB
ALBUMIN SERPL-MCNC: 3.4 G/DL (ref 3.5–4.6)
ALP SERPL-CCNC: 87 U/L (ref 40–130)
ALT SERPL-CCNC: 18 U/L (ref 0–33)
ANION GAP SERPL CALCULATED.3IONS-SCNC: 15 MEQ/L (ref 9–15)
AST SERPL-CCNC: 17 U/L (ref 0–35)
BACTERIA URNS QL MICRO: ABNORMAL /HPF
BILIRUB SERPL-MCNC: 0.3 MG/DL (ref 0.2–0.7)
BILIRUB UR QL STRIP: NEGATIVE
BUN SERPL-MCNC: 17 MG/DL (ref 8–23)
CALCIUM SERPL-MCNC: 9 MG/DL (ref 8.5–9.9)
CHLORIDE SERPL-SCNC: 102 MEQ/L (ref 95–107)
CLARITY UR: ABNORMAL
CO2 SERPL-SCNC: 22 MEQ/L (ref 20–31)
COLOR UR: YELLOW
CREAT SERPL-MCNC: 0.82 MG/DL (ref 0.5–0.9)
EPI CELLS #/AREA URNS AUTO: ABNORMAL /HPF (ref 0–5)
GLOBULIN SER CALC-MCNC: 3.2 G/DL (ref 2.3–3.5)
GLUCOSE SERPL-MCNC: 299 MG/DL (ref 70–99)
GLUCOSE UR STRIP-MCNC: 500 MG/DL
HGB UR QL STRIP: ABNORMAL
KETONES UR STRIP-MCNC: NEGATIVE MG/DL
LEUKOCYTE ESTERASE UR QL STRIP: ABNORMAL
LIPASE SERPL-CCNC: 10 U/L (ref 12–95)
NITRITE UR QL STRIP: NEGATIVE
PERFORMED ON: NORMAL
PH UR STRIP: 5 [PH] (ref 5–9)
POC CREATININE: 0.9 MG/DL (ref 0.6–1.2)
POC SAMPLE TYPE: NORMAL
POTASSIUM SERPL-SCNC: 4.3 MEQ/L (ref 3.4–4.9)
PROT SERPL-MCNC: 6.6 G/DL (ref 6.3–8)
PROT UR STRIP-MCNC: 100 MG/DL
RBC #/AREA URNS HPF: ABNORMAL /HPF (ref 0–2)
SODIUM SERPL-SCNC: 139 MEQ/L (ref 135–144)
SP GR UR STRIP: 1.07 (ref 1–1.03)
URINE REFLEX TO CULTURE: YES
UROBILINOGEN UR STRIP-ACNC: 0.2 E.U./DL
WBC #/AREA URNS AUTO: >100 /HPF (ref 0–5)
YEAST URNS QL MICRO: PRESENT /HPF

## 2024-10-24 PROCEDURE — 80053 COMPREHEN METABOLIC PANEL: CPT

## 2024-10-24 PROCEDURE — 81001 URINALYSIS AUTO W/SCOPE: CPT

## 2024-10-24 PROCEDURE — 87086 URINE CULTURE/COLONY COUNT: CPT

## 2024-10-24 PROCEDURE — 83690 ASSAY OF LIPASE: CPT

## 2024-10-24 PROCEDURE — 6370000000 HC RX 637 (ALT 250 FOR IP): Performed by: PHYSICIAN ASSISTANT

## 2024-10-24 RX ORDER — FLUCONAZOLE 100 MG/1
200 TABLET ORAL ONCE
Status: COMPLETED | OUTPATIENT
Start: 2024-10-24 | End: 2024-10-24

## 2024-10-24 RX ORDER — ONDANSETRON 4 MG/1
4 TABLET, FILM COATED ORAL EVERY 8 HOURS PRN
Qty: 10 TABLET | Refills: 0 | Status: SHIPPED | OUTPATIENT
Start: 2024-10-24

## 2024-10-24 RX ORDER — PANTOPRAZOLE SODIUM 40 MG/1
40 TABLET, DELAYED RELEASE ORAL ONCE
Status: COMPLETED | OUTPATIENT
Start: 2024-10-24 | End: 2024-10-24

## 2024-10-24 RX ORDER — PANTOPRAZOLE SODIUM 20 MG/1
40 TABLET, DELAYED RELEASE ORAL DAILY
Qty: 14 TABLET | Refills: 0 | Status: SHIPPED | OUTPATIENT
Start: 2024-10-24 | End: 2024-10-31

## 2024-10-24 RX ORDER — NYSTATIN 100000 U/G
OINTMENT TOPICAL
Qty: 1 EACH | Refills: 0 | Status: SHIPPED | OUTPATIENT
Start: 2024-10-24

## 2024-10-24 RX ADMIN — PANTOPRAZOLE SODIUM 40 MG: 40 TABLET, DELAYED RELEASE ORAL at 02:03

## 2024-10-24 RX ADMIN — FLUCONAZOLE 200 MG: 100 TABLET ORAL at 02:03

## 2024-10-24 NOTE — DISCHARGE INSTRUCTIONS
Tiana- care every 4-6 hours.  Follow-up with primary care return to if any symptoms worsen or new symptoms develop.

## 2024-10-24 NOTE — ED NOTES
Pt came via EMS from [facility] for abdominal pain and \"lack of care\"  Pt claims she isn't receiving proper care at the nursing home  Pt appears very disheveled on assessment  VSS  Afebrile  I cleaned the pt because she was soiled with a large bowel movement (firm and brown) and she had a severe rash to the buttocks and groin area which I cleaned.

## 2024-10-24 NOTE — ED NOTES
[facility] here to pick pt up and taking back to nursing home  No acute distress noted.  Resps even non labored.  VSS

## 2024-10-25 LAB — BACTERIA UR CULT: NORMAL

## 2024-10-31 ENCOUNTER — OFFICE VISIT (OUTPATIENT)
Dept: GERIATRIC MEDICINE | Age: 64
End: 2024-10-31
Payer: MEDICARE

## 2024-10-31 DIAGNOSIS — K58.2 IRRITABLE BOWEL SYNDROME WITH BOTH CONSTIPATION AND DIARRHEA: Primary | ICD-10-CM

## 2024-10-31 PROCEDURE — 99348 HOME/RES VST EST LOW MDM 30: CPT | Performed by: NURSE PRACTITIONER

## 2024-10-31 PROCEDURE — 1036F TOBACCO NON-USER: CPT | Performed by: NURSE PRACTITIONER

## 2024-10-31 PROCEDURE — G8484 FLU IMMUNIZE NO ADMIN: HCPCS | Performed by: NURSE PRACTITIONER

## 2024-10-31 PROCEDURE — G8417 CALC BMI ABV UP PARAM F/U: HCPCS | Performed by: NURSE PRACTITIONER

## 2024-10-31 PROCEDURE — 3017F COLORECTAL CA SCREEN DOC REV: CPT | Performed by: NURSE PRACTITIONER

## 2024-11-07 ASSESSMENT — ENCOUNTER SYMPTOMS
CONSTIPATION: 1
ABDOMINAL PAIN: 1

## 2024-11-07 NOTE — PROGRESS NOTES
[facility]- [address] [place] [address]    10/31/2024    Gemini Molina  is a 63 y.o. in the NF being seen for    Chief Complaint   Patient presents with    Follow-up       Gemini Molina is a 63-year-old female residing at [facility] assisted living [facility].  Patient being seen today for follow up of IBS.  Patient was evaluated in the ED on 10/23.  Patient found to have fecal impaction which resolved with soapsuds enema.  Patient also given Diflucan and nystatin for fungal infection.  At time of visit patient resting in room.  Appears disheveled.  Per staff patient continues to refuse help with bathing, [facility].  Per patient she would like her dicyclomine to be changed back to routine as she has been having abdominal discomfort.  Patient also reports that she feels she is constipated.          Past Medical History:   Diagnosis Date    Acute cystitis with hematuria 07/10/2023    Acute pain due to trauma 03/19/2024    Agitation due to dementia ([facility]) 07/18/2023    JACOB (acute kidney injury) ([facility]) 07/10/2023    Fall 07/10/2023    Hypothyroidism 07/10/2023    IBS (irritable bowel syndrome)     Neuropathy 07/18/2023    Poorly controlled type 2 diabetes mellitus with neuropathy ([facility]) 07/10/2023    Primary hypertension 07/10/2023    Uncontrolled type 2 diabetes mellitus with hyperglycemia, with long-term current use of insulin ([facility]) 07/10/2023    Weakness 07/10/2023     No past surgical history on file.  No family history on file.  Social History     Socioeconomic History    Marital status:      Spouse name: Not on file    Number of children: Not on file    Years of education: Not on file    Highest education level: Not on file   Occupational History    Not on file   Tobacco Use    Smoking status: Never    Smokeless tobacco: Never   Substance and Sexual Activity    Alcohol use: Never    Drug use: Not on file    Sexual activity: Not on file   Other Topics Concern    Not on file   Social History

## 2024-11-20 ENCOUNTER — OFFICE VISIT (OUTPATIENT)
Dept: GERIATRIC MEDICINE | Age: 64
End: 2024-11-20
Payer: MEDICARE

## 2024-11-20 DIAGNOSIS — Z00.00 INITIAL MEDICARE ANNUAL WELLNESS VISIT: Primary | ICD-10-CM

## 2024-11-20 PROCEDURE — G0438 PPPS, INITIAL VISIT: HCPCS | Performed by: NURSE PRACTITIONER

## 2024-11-20 PROCEDURE — G8484 FLU IMMUNIZE NO ADMIN: HCPCS | Performed by: NURSE PRACTITIONER

## 2024-11-20 PROCEDURE — 3017F COLORECTAL CA SCREEN DOC REV: CPT | Performed by: NURSE PRACTITIONER

## 2024-11-22 DIAGNOSIS — G62.9 NEUROPATHY: ICD-10-CM

## 2024-11-22 RX ORDER — PREGABALIN 50 MG/1
50 CAPSULE ORAL 2 TIMES DAILY
Qty: 60 CAPSULE | Refills: 1 | Status: SHIPPED | OUTPATIENT
Start: 2024-11-22 | End: 2025-01-21

## 2024-11-22 ASSESSMENT — PATIENT HEALTH QUESTIONNAIRE - PHQ9
6. FEELING BAD ABOUT YOURSELF - OR THAT YOU ARE A FAILURE OR HAVE LET YOURSELF OR YOUR FAMILY DOWN: NOT AT ALL
5. POOR APPETITE OR OVEREATING: NOT AT ALL
SUM OF ALL RESPONSES TO PHQ9 QUESTIONS 1 & 2: 0
SUM OF ALL RESPONSES TO PHQ QUESTIONS 1-9: 0
8. MOVING OR SPEAKING SO SLOWLY THAT OTHER PEOPLE COULD HAVE NOTICED. OR THE OPPOSITE, BEING SO FIGETY OR RESTLESS THAT YOU HAVE BEEN MOVING AROUND A LOT MORE THAN USUAL: NOT AT ALL
1. LITTLE INTEREST OR PLEASURE IN DOING THINGS: NOT AT ALL
7. TROUBLE CONCENTRATING ON THINGS, SUCH AS READING THE NEWSPAPER OR WATCHING TELEVISION: NOT AT ALL
SUM OF ALL RESPONSES TO PHQ QUESTIONS 1-9: 0
4. FEELING TIRED OR HAVING LITTLE ENERGY: NOT AT ALL
3. TROUBLE FALLING OR STAYING ASLEEP: NOT AT ALL
10. IF YOU CHECKED OFF ANY PROBLEMS, HOW DIFFICULT HAVE THESE PROBLEMS MADE IT FOR YOU TO DO YOUR WORK, TAKE CARE OF THINGS AT HOME, OR GET ALONG WITH OTHER PEOPLE: NOT DIFFICULT AT ALL
SUM OF ALL RESPONSES TO PHQ QUESTIONS 1-9: 0
2. FEELING DOWN, DEPRESSED OR HOPELESS: NOT AT ALL
9. THOUGHTS THAT YOU WOULD BE BETTER OFF DEAD, OR OF HURTING YOURSELF: NOT AT ALL
SUM OF ALL RESPONSES TO PHQ QUESTIONS 1-9: 0

## 2024-11-22 NOTE — PROGRESS NOTES
tablet by mouth nightly Indications: High Amount of Fats in the Blood  ProviderPorsche MD   glimepiride (AMARYL) 4 MG tablet Take 1 tablet by mouth in the morning and 1 tablet in the evening. Indications: Type 2 Diabetes.  ProviderPorsche MD   insulin glargine (LANTUS) 100 UNIT/ML injection vial Inject 15 Units into the skin every morning Indications: Type 2 Diabetes  ProviderPorsche MD   levothyroxine (SYNTHROID) 75 MCG tablet Take 1 tablet by mouth Daily Indications: Underactive Thyroid  ProviderPorsche MD   lisinopril (PRINIVIL;ZESTRIL) 20 MG tablet Take 1 tablet by mouth daily Indications: High Blood Pressure  ProviderPorsche MD   metoprolol succinate (TOPROL XL) 25 MG extended release tablet Take 1 tablet by mouth daily Indications: High Blood Pressure  ProviderPorsche MD   vitamin D (CHOLECALCIFEROL) 50 MCG (2000 UT) TABS tablet Take 1 tablet by mouth daily Indications: Nutritional Support  ProviderPorsche MD       CareTeam (Including outside providers/suppliers regularly involved in providing care):   Patient Care Team:  Jhonatan Donovan MD as PCP - General (Internal Medicine)  Jhonatan Donovan MD as PCP - Empaneled Provider      Reviewed and updated this visit:  Tobacco  Allergies  Meds  Problems  Med Hx  Surg Hx  Soc Hx  Fam Hx

## 2024-12-02 ENCOUNTER — APPOINTMENT (OUTPATIENT)
Dept: CT IMAGING | Age: 64
End: 2024-12-02
Payer: MEDICAID

## 2024-12-02 ENCOUNTER — HOSPITAL ENCOUNTER (EMERGENCY)
Age: 64
Discharge: NURSING, REHAB OR LONG-TERM CARE | End: 2024-12-02
Attending: STUDENT IN AN ORGANIZED HEALTH CARE EDUCATION/TRAINING PROGRAM
Payer: MEDICAID

## 2024-12-02 VITALS
RESPIRATION RATE: 17 BRPM | HEART RATE: 88 BPM | WEIGHT: 200 LBS | OXYGEN SATURATION: 99 % | SYSTOLIC BLOOD PRESSURE: 142 MMHG | BODY MASS INDEX: 31.32 KG/M2 | DIASTOLIC BLOOD PRESSURE: 75 MMHG | TEMPERATURE: 97.7 F

## 2024-12-02 DIAGNOSIS — K56.41 FECAL IMPACTION (HCC): ICD-10-CM

## 2024-12-02 DIAGNOSIS — K59.00 CONSTIPATION, UNSPECIFIED CONSTIPATION TYPE: Primary | ICD-10-CM

## 2024-12-02 LAB
ALBUMIN SERPL-MCNC: 3.6 G/DL (ref 3.5–4.6)
ALP SERPL-CCNC: 96 U/L (ref 40–130)
ALT SERPL-CCNC: 16 U/L (ref 0–33)
ANION GAP SERPL CALCULATED.3IONS-SCNC: 13 MEQ/L (ref 9–15)
AST SERPL-CCNC: 14 U/L (ref 0–35)
BACTERIA URNS QL MICRO: ABNORMAL /HPF
BASOPHILS # BLD: 0 K/UL (ref 0–0.2)
BASOPHILS NFR BLD: 0.3 %
BILIRUB SERPL-MCNC: 0.3 MG/DL (ref 0.2–0.7)
BILIRUB UR QL STRIP: NEGATIVE
BUN SERPL-MCNC: 19 MG/DL (ref 8–23)
CALCIUM SERPL-MCNC: 9.2 MG/DL (ref 8.5–9.9)
CHLORIDE SERPL-SCNC: 104 MEQ/L (ref 95–107)
CLARITY UR: ABNORMAL
CO2 SERPL-SCNC: 22 MEQ/L (ref 20–31)
COLOR UR: YELLOW
CREAT SERPL-MCNC: 0.82 MG/DL (ref 0.5–0.9)
EOSINOPHIL # BLD: 0.2 K/UL (ref 0–0.7)
EOSINOPHIL NFR BLD: 1.6 %
EPI CELLS #/AREA URNS AUTO: >100 /HPF (ref 0–5)
ERYTHROCYTE [DISTWIDTH] IN BLOOD BY AUTOMATED COUNT: 14.4 % (ref 11.5–14.5)
GLOBULIN SER CALC-MCNC: 3.3 G/DL (ref 2.3–3.5)
GLUCOSE SERPL-MCNC: 268 MG/DL (ref 70–99)
GLUCOSE UR STRIP-MCNC: >=1000 MG/DL
HCT VFR BLD AUTO: 45.7 % (ref 37–47)
HGB BLD-MCNC: 14.4 G/DL (ref 12–16)
HGB UR QL STRIP: ABNORMAL
KETONES UR STRIP-MCNC: ABNORMAL MG/DL
LACTATE BLDV-SCNC: 2.9 MMOL/L (ref 0.5–2.2)
LEUKOCYTE ESTERASE UR QL STRIP: ABNORMAL
LIPASE SERPL-CCNC: 10 U/L (ref 12–95)
LYMPHOCYTES # BLD: 4.4 K/UL (ref 1–4.8)
LYMPHOCYTES NFR BLD: 31.8 %
MCH RBC QN AUTO: 27.8 PG (ref 27–31.3)
MCHC RBC AUTO-ENTMCNC: 31.5 % (ref 33–37)
MCV RBC AUTO: 88.2 FL (ref 79.4–94.8)
MONOCYTES # BLD: 0.9 K/UL (ref 0.2–0.8)
MONOCYTES NFR BLD: 6.6 %
MUCOUS THREADS URNS QL MICRO: PRESENT /LPF
NEUTROPHILS # BLD: 8.2 K/UL (ref 1.4–6.5)
NEUTS SEG NFR BLD: 59.4 %
NITRITE UR QL STRIP: NEGATIVE
PH UR STRIP: 5.5 [PH] (ref 5–9)
PLATELET # BLD AUTO: 417 K/UL (ref 130–400)
POTASSIUM SERPL-SCNC: 4.3 MEQ/L (ref 3.4–4.9)
PROT SERPL-MCNC: 6.9 G/DL (ref 6.3–8)
PROT UR STRIP-MCNC: 100 MG/DL
RBC # BLD AUTO: 5.18 M/UL (ref 4.2–5.4)
RBC #/AREA URNS AUTO: ABNORMAL /HPF (ref 0–5)
SODIUM SERPL-SCNC: 139 MEQ/L (ref 135–144)
SP GR UR STRIP: 1.06 (ref 1–1.03)
URINE REFLEX TO CULTURE: YES
UROBILINOGEN UR STRIP-ACNC: 0.2 E.U./DL
WBC # BLD AUTO: 13.9 K/UL (ref 4.8–10.8)
WBC #/AREA URNS AUTO: ABNORMAL /HPF (ref 0–5)
WBC #/AREA URNS HPF: >100 /HPF (ref 0–5)
YEAST URNS QL MICRO: PRESENT /HPF

## 2024-12-02 PROCEDURE — 96374 THER/PROPH/DIAG INJ IV PUSH: CPT

## 2024-12-02 PROCEDURE — 6360000002 HC RX W HCPCS: Performed by: STUDENT IN AN ORGANIZED HEALTH CARE EDUCATION/TRAINING PROGRAM

## 2024-12-02 PROCEDURE — 6360000004 HC RX CONTRAST MEDICATION: Performed by: STUDENT IN AN ORGANIZED HEALTH CARE EDUCATION/TRAINING PROGRAM

## 2024-12-02 PROCEDURE — 96375 TX/PRO/DX INJ NEW DRUG ADDON: CPT

## 2024-12-02 PROCEDURE — 99285 EMERGENCY DEPT VISIT HI MDM: CPT

## 2024-12-02 PROCEDURE — 74177 CT ABD & PELVIS W/CONTRAST: CPT

## 2024-12-02 PROCEDURE — 87086 URINE CULTURE/COLONY COUNT: CPT

## 2024-12-02 PROCEDURE — 85025 COMPLETE CBC W/AUTO DIFF WBC: CPT

## 2024-12-02 PROCEDURE — 81001 URINALYSIS AUTO W/SCOPE: CPT

## 2024-12-02 PROCEDURE — 80053 COMPREHEN METABOLIC PANEL: CPT

## 2024-12-02 PROCEDURE — 93005 ELECTROCARDIOGRAM TRACING: CPT | Performed by: STUDENT IN AN ORGANIZED HEALTH CARE EDUCATION/TRAINING PROGRAM

## 2024-12-02 PROCEDURE — 96361 HYDRATE IV INFUSION ADD-ON: CPT

## 2024-12-02 PROCEDURE — 83690 ASSAY OF LIPASE: CPT

## 2024-12-02 PROCEDURE — 6370000000 HC RX 637 (ALT 250 FOR IP): Performed by: STUDENT IN AN ORGANIZED HEALTH CARE EDUCATION/TRAINING PROGRAM

## 2024-12-02 PROCEDURE — 2580000003 HC RX 258: Performed by: STUDENT IN AN ORGANIZED HEALTH CARE EDUCATION/TRAINING PROGRAM

## 2024-12-02 PROCEDURE — 83605 ASSAY OF LACTIC ACID: CPT

## 2024-12-02 RX ORDER — 0.9 % SODIUM CHLORIDE 0.9 %
500 INTRAVENOUS SOLUTION INTRAVENOUS ONCE
Status: COMPLETED | OUTPATIENT
Start: 2024-12-02 | End: 2024-12-02

## 2024-12-02 RX ORDER — DOCUSATE SODIUM 100 MG/1
100 CAPSULE, LIQUID FILLED ORAL 2 TIMES DAILY PRN
Qty: 60 CAPSULE | Refills: 0 | Status: SHIPPED | OUTPATIENT
Start: 2024-12-02 | End: 2025-01-01

## 2024-12-02 RX ORDER — KETOROLAC TROMETHAMINE 15 MG/ML
15 INJECTION, SOLUTION INTRAMUSCULAR; INTRAVENOUS ONCE
Status: COMPLETED | OUTPATIENT
Start: 2024-12-02 | End: 2024-12-02

## 2024-12-02 RX ORDER — CEPHALEXIN 500 MG/1
500 CAPSULE ORAL 2 TIMES DAILY
Qty: 14 CAPSULE | Refills: 0 | Status: SHIPPED | OUTPATIENT
Start: 2024-12-02 | End: 2024-12-09

## 2024-12-02 RX ORDER — IOPAMIDOL 755 MG/ML
75 INJECTION, SOLUTION INTRAVASCULAR
Status: COMPLETED | OUTPATIENT
Start: 2024-12-02 | End: 2024-12-02

## 2024-12-02 RX ORDER — CEPHALEXIN 500 MG/1
500 CAPSULE ORAL ONCE
Status: COMPLETED | OUTPATIENT
Start: 2024-12-02 | End: 2024-12-02

## 2024-12-02 RX ORDER — ONDANSETRON 2 MG/ML
4 INJECTION INTRAMUSCULAR; INTRAVENOUS ONCE
Status: COMPLETED | OUTPATIENT
Start: 2024-12-02 | End: 2024-12-02

## 2024-12-02 RX ORDER — POLYETHYLENE GLYCOL 3350 17 G/17G
17 POWDER, FOR SOLUTION ORAL DAILY
Qty: 30 PACKET | Refills: 0 | Status: SHIPPED | OUTPATIENT
Start: 2024-12-02 | End: 2025-01-01

## 2024-12-02 RX ADMIN — KETOROLAC TROMETHAMINE 15 MG: 15 INJECTION, SOLUTION INTRAMUSCULAR; INTRAVENOUS at 11:17

## 2024-12-02 RX ADMIN — IOPAMIDOL 75 ML: 755 INJECTION, SOLUTION INTRAVENOUS at 11:41

## 2024-12-02 RX ADMIN — SODIUM CHLORIDE 500 ML: 9 INJECTION, SOLUTION INTRAVENOUS at 11:16

## 2024-12-02 RX ADMIN — ONDANSETRON 4 MG: 2 INJECTION, SOLUTION INTRAMUSCULAR; INTRAVENOUS at 11:17

## 2024-12-02 RX ADMIN — CEPHALEXIN 500 MG: 500 CAPSULE ORAL at 14:49

## 2024-12-02 ASSESSMENT — PAIN DESCRIPTION - LOCATION
LOCATION: ABDOMEN
LOCATION: ABDOMEN

## 2024-12-02 ASSESSMENT — PAIN DESCRIPTION - DESCRIPTORS: DESCRIPTORS: SHARP

## 2024-12-02 ASSESSMENT — PAIN SCALES - GENERAL
PAINLEVEL_OUTOF10: 10
PAINLEVEL_OUTOF10: 9

## 2024-12-02 NOTE — ED NOTES
Pt arrived with complaints of lower abd pain, n/v and dizziness. Per ems she has frequent UTI's. [facility]. Pt stated that she has had this pain for the last 3-4 days. Pt denies any urinary symptoms. Pt placed on full cardiac monitor. EKG performed. Call light within reach. Labs obtained and sent. Will continue plan of care

## 2024-12-02 NOTE — DISCHARGE INSTRUCTIONS
Take the antibiotics as prescribed and to completion    Follow-up with the facility physician for a recheck later this week    Give Colace each day, also give a capful of MiraLAX every day    If given narcotics (opiates) during this Emergency Department visit, please do not drink, drive or operate any machinery for at least 4 - 6 hours.    Avoid eating any spicy food, milk type products or drinks that have caffeine in it.  Take all medications as prescribed.  For pain use ibuprofen (Motrin) or acetaminophen (Tylenol), unless prescribed medications that have acetaminophen in it.  You can take over the counter acetaminophen tablets (1 - 2 tablets of the 500-mg strength every 6 hours) or ibuprofen tablets (2 tablets every 4 hours).    PLEASE RETURN TO THE EMERGENCY DEPARTMENT IMMEDIATELY for worsening symptoms, or if you develop any concerning symptoms such as: high fever not relieved by acetaminophen (Tylenol) and/or ibuprofen (Motrin), chills, shortness of breath, chest pain, persistent nausea and/or vomiting, numbness, weakness or tingling in the arms or legs or change in color of the extremities, changes in mental status, persistent headache, blurry vision.    Return within 8 - 12 hours if you have any of the following: worsening of pain in your abdomen, no food sounds good to you, you continue to vomit, pain goes to your back, have pain in the abdomen when going over a bump in the car or when you jump up and down, develop vaginal bleeding or discharge, inability to urinate, unable to follow up with your physician, or other any other care or concern.

## 2024-12-02 NOTE — ED TRIAGE NOTES
From [facility] brought by squad with c/o abd pain   Pt is having an IBS flare- up  Pt is afebrile   Pt is A&Ox4

## 2024-12-02 NOTE — ED PROVIDER NOTES
Gravity, UA 1.056 1.005 - 1.030    Blood, Urine MODERATE (A) Negative    pH, Urine 5.5 5.0 - 9.0    Protein,  (A) Negative mg/dL    Urobilinogen, Urine 0.2 <2.0 E.U./dL    Nitrite, Urine Negative Negative    Leukocyte Esterase, Urine MODERATE (A) Negative    Urine Reflex to Culture Yes    CBC with Auto Differential   Result Value Ref Range    WBC 13.9 (H) 4.8 - 10.8 K/uL    RBC 5.18 4.20 - 5.40 M/uL    Hemoglobin 14.4 12.0 - 16.0 g/dL    Hematocrit 45.7 37.0 - 47.0 %    MCV 88.2 79.4 - 94.8 fL    MCH 27.8 27.0 - 31.3 pg    MCHC 31.5 (L) 33.0 - 37.0 %    RDW 14.4 11.5 - 14.5 %    Platelets 417 (H) 130 - 400 K/uL    Neutrophils % 59.4 %    Lymphocytes % 31.8 %    Monocytes % 6.6 %    Eosinophils % 1.6 %    Basophils % 0.3 %    Neutrophils Absolute 8.2 (H) 1.4 - 6.5 K/uL    Lymphocytes Absolute 4.4 1.0 - 4.8 K/uL    Monocytes Absolute 0.9 (H) 0.2 - 0.8 K/uL    Eosinophils Absolute 0.2 0.0 - 0.7 K/uL    Basophils Absolute 0.0 0.0 - 0.2 K/uL   CMP   Result Value Ref Range    Sodium 139 135 - 144 mEq/L    Potassium 4.3 3.4 - 4.9 mEq/L    Chloride 104 95 - 107 mEq/L    CO2 22 20 - 31 mEq/L    Anion Gap 13 9 - 15 mEq/L    Glucose 268 (H) 70 - 99 mg/dL    BUN 19 8 - 23 mg/dL    Creatinine 0.82 0.50 - 0.90 mg/dL    Est, Glom Filt Rate 79.4 >60    Calcium 9.2 8.5 - 9.9 mg/dL    Total Protein 6.9 6.3 - 8.0 g/dL    Albumin 3.6 3.5 - 4.6 g/dL    Total Bilirubin 0.3 0.2 - 0.7 mg/dL    Alkaline Phosphatase 96 40 - 130 U/L    ALT 16 0 - 33 U/L    AST 14 0 - 35 U/L    Globulin 3.3 2.3 - 3.5 g/dL   Lipase   Result Value Ref Range    Lipase 10 (L) 12 - 95 U/L   Lactic Acid   Result Value Ref Range    Lactic Acid 2.9 (H) 0.5 - 2.2 mmol/L   Microscopic Urinalysis   Result Value Ref Range    Mucus, UA Present None Seen /LPF    WBC, UA >100 (A) 0 - 5 /HPF    Bacteria, UA MANY (A) Negative /HPF    Yeast, UA Present (A) None Seen /HPF    WBC, UA 10-20 (A) 0 - 5 /HPF    RBC, UA 10-20 (A) 0 - 5 /HPF    Epithelial Cells, UA >100 (H) 0 - 5

## 2024-12-03 LAB
BACTERIA UR CULT: NORMAL
EKG ATRIAL RATE: 85 BPM
EKG P AXIS: 43 DEGREES
EKG P-R INTERVAL: 166 MS
EKG Q-T INTERVAL: 378 MS
EKG QRS DURATION: 96 MS
EKG QTC CALCULATION (BAZETT): 449 MS
EKG R AXIS: -42 DEGREES
EKG T AXIS: 63 DEGREES
EKG VENTRICULAR RATE: 85 BPM

## 2025-01-08 ENCOUNTER — HOSPITAL ENCOUNTER (EMERGENCY)
Age: 65
Discharge: HOME | End: 2025-01-08
Attending: STUDENT IN AN ORGANIZED HEALTH CARE EDUCATION/TRAINING PROGRAM
Payer: MEDICARE

## 2025-01-08 ENCOUNTER — APPOINTMENT (OUTPATIENT)
Dept: GENERAL RADIOLOGY | Age: 65
End: 2025-01-08
Payer: MEDICARE

## 2025-01-08 VITALS
DIASTOLIC BLOOD PRESSURE: 70 MMHG | WEIGHT: 217 LBS | SYSTOLIC BLOOD PRESSURE: 113 MMHG | HEIGHT: 68 IN | TEMPERATURE: 98.2 F | RESPIRATION RATE: 16 BRPM | HEART RATE: 71 BPM | BODY MASS INDEX: 32.89 KG/M2 | OXYGEN SATURATION: 100 %

## 2025-01-08 DIAGNOSIS — R07.9 CHEST PAIN, UNSPECIFIED TYPE: Primary | ICD-10-CM

## 2025-01-08 LAB
ALBUMIN SERPL-MCNC: 3.4 G/DL (ref 3.5–4.6)
ALP SERPL-CCNC: 120 U/L (ref 40–130)
ALT SERPL-CCNC: 11 U/L (ref 0–33)
ANION GAP SERPL CALCULATED.3IONS-SCNC: 12 MEQ/L (ref 9–15)
AST SERPL-CCNC: 12 U/L (ref 0–35)
BASOPHILS # BLD: 0 K/UL (ref 0–0.2)
BASOPHILS NFR BLD: 0.3 %
BILIRUB SERPL-MCNC: <0.2 MG/DL (ref 0.2–0.7)
BUN SERPL-MCNC: 19 MG/DL (ref 8–23)
CALCIUM SERPL-MCNC: 8.9 MG/DL (ref 8.5–9.9)
CHLORIDE SERPL-SCNC: 106 MEQ/L (ref 95–107)
CO2 SERPL-SCNC: 27 MEQ/L (ref 20–31)
CREAT SERPL-MCNC: 0.95 MG/DL (ref 0.5–0.9)
EOSINOPHIL # BLD: 0.4 K/UL (ref 0–0.7)
EOSINOPHIL NFR BLD: 4 %
ERYTHROCYTE [DISTWIDTH] IN BLOOD BY AUTOMATED COUNT: 14.9 % (ref 11.5–14.5)
GLOBULIN SER CALC-MCNC: 2.8 G/DL (ref 2.3–3.5)
GLUCOSE SERPL-MCNC: 317 MG/DL (ref 70–99)
HCT VFR BLD AUTO: 41.1 % (ref 37–47)
HGB BLD-MCNC: 13.2 G/DL (ref 12–16)
LIPASE SERPL-CCNC: 16 U/L (ref 12–95)
LYMPHOCYTES # BLD: 4.2 K/UL (ref 1–4.8)
LYMPHOCYTES NFR BLD: 40.6 %
MCH RBC QN AUTO: 28.3 PG (ref 27–31.3)
MCHC RBC AUTO-ENTMCNC: 32.1 % (ref 33–37)
MCV RBC AUTO: 88.2 FL (ref 79.4–94.8)
MONOCYTES # BLD: 0.8 K/UL (ref 0.2–0.8)
MONOCYTES NFR BLD: 7.8 %
NEUTROPHILS # BLD: 4.8 K/UL (ref 1.4–6.5)
NEUTS SEG NFR BLD: 47.2 %
PLATELET # BLD AUTO: 275 K/UL (ref 130–400)
POTASSIUM SERPL-SCNC: 4.8 MEQ/L (ref 3.4–4.9)
PROT SERPL-MCNC: 6.2 G/DL (ref 6.3–8)
RBC # BLD AUTO: 4.66 M/UL (ref 4.2–5.4)
SODIUM SERPL-SCNC: 145 MEQ/L (ref 135–144)
TROPONIN, HIGH SENSITIVITY: 8 NG/L (ref 0–19)
TROPONIN, HIGH SENSITIVITY: <6 NG/L (ref 0–19)
WBC # BLD AUTO: 10.2 K/UL (ref 4.8–10.8)

## 2025-01-08 PROCEDURE — 84484 ASSAY OF TROPONIN QUANT: CPT

## 2025-01-08 PROCEDURE — 2500000003 HC RX 250 WO HCPCS: Performed by: STUDENT IN AN ORGANIZED HEALTH CARE EDUCATION/TRAINING PROGRAM

## 2025-01-08 PROCEDURE — 83690 ASSAY OF LIPASE: CPT

## 2025-01-08 PROCEDURE — 93005 ELECTROCARDIOGRAM TRACING: CPT | Performed by: PHYSICIAN ASSISTANT

## 2025-01-08 PROCEDURE — 71045 X-RAY EXAM CHEST 1 VIEW: CPT

## 2025-01-08 PROCEDURE — 2580000003 HC RX 258: Performed by: STUDENT IN AN ORGANIZED HEALTH CARE EDUCATION/TRAINING PROGRAM

## 2025-01-08 PROCEDURE — 85025 COMPLETE CBC W/AUTO DIFF WBC: CPT

## 2025-01-08 PROCEDURE — 36415 COLL VENOUS BLD VENIPUNCTURE: CPT

## 2025-01-08 PROCEDURE — 96374 THER/PROPH/DIAG INJ IV PUSH: CPT

## 2025-01-08 PROCEDURE — 80053 COMPREHEN METABOLIC PANEL: CPT

## 2025-01-08 PROCEDURE — 99285 EMERGENCY DEPT VISIT HI MDM: CPT

## 2025-01-08 RX ORDER — FAMOTIDINE 20 MG/1
20 TABLET, FILM COATED ORAL DAILY PRN
Qty: 30 TABLET | Refills: 0 | Status: SHIPPED | OUTPATIENT
Start: 2025-01-08 | End: 2025-02-07

## 2025-01-08 RX ADMIN — FAMOTIDINE 20 MG: 10 INJECTION, SOLUTION INTRAVENOUS at 20:40

## 2025-01-08 ASSESSMENT — PAIN DESCRIPTION - PAIN TYPE
TYPE: ACUTE PAIN
TYPE: ACUTE PAIN

## 2025-01-08 ASSESSMENT — PAIN DESCRIPTION - FREQUENCY: FREQUENCY: CONTINUOUS

## 2025-01-08 ASSESSMENT — PAIN DESCRIPTION - LOCATION
LOCATION: CHEST
LOCATION: CHEST

## 2025-01-08 ASSESSMENT — PAIN SCALES - GENERAL
PAINLEVEL_OUTOF10: 9
PAINLEVEL_OUTOF10: 8

## 2025-01-08 ASSESSMENT — PAIN DESCRIPTION - ORIENTATION
ORIENTATION: LEFT
ORIENTATION: RIGHT

## 2025-01-08 ASSESSMENT — PAIN DESCRIPTION - ONSET: ONSET: ON-GOING

## 2025-01-08 ASSESSMENT — PAIN - FUNCTIONAL ASSESSMENT
PAIN_FUNCTIONAL_ASSESSMENT: 0-10
PAIN_FUNCTIONAL_ASSESSMENT: 0-10

## 2025-01-09 LAB
EKG ATRIAL RATE: 71 BPM
EKG P AXIS: 31 DEGREES
EKG P-R INTERVAL: 184 MS
EKG Q-T INTERVAL: 398 MS
EKG QRS DURATION: 100 MS
EKG QTC CALCULATION (BAZETT): 432 MS
EKG R AXIS: -39 DEGREES
EKG T AXIS: 36 DEGREES
EKG VENTRICULAR RATE: 71 BPM

## 2025-01-09 NOTE — ED PROVIDER NOTES
[facility] EMERGENCY DEPARTMENT  Emergency Department Encounter  Emergency Medicine      Pt Name: Gemini Molina  MRN:[id number]  Birthdate 1960  Date of evaluation: 1/8/25  Time: 8:20 PM EST  PCP:  Jhonatan Donovan MD    CHIEF COMPLAINT       Chief Complaint   Patient presents with    Chest Pain       HISTORY OF PRESENT ILLNESS  (Location/Symptom, Timing/Onset, Context/Setting, Quality, Duration, ModifyingFactors, Severity.)      Gemini Molina is a 64 y.o. female history of diabetes, hypertension, presents with chest pain started 4 hours ago.  She was laying in bed when it started.  She first felt it in her back and then her chest.  She has had symptoms like this before that went away.  She did eat a meal shortly before this, she was remember what she had.  Currently it has decreased slightly in intensity.  Denies any radiation of the pain otherwise.  Denies any lightheadedness nausea vomiting cough sore throat fevers chills abdominal pain leg swelling or calf pain, denies shortness of breath    PAST MEDICAL / SURGICAL / SOCIAL /FAMILY HISTORY      has a past medical history of Acute cystitis with hematuria, Acute pain due to trauma, Agitation due to dementia ([facility]), JACOB (acute kidney injury) ([facility]), Fall, Hypothyroidism, IBS (irritable bowel syndrome), Neuropathy, Poorly controlled type 2 diabetes mellitus with neuropathy ([facility]), Primary hypertension, Uncontrolled type 2 diabetes mellitus with hyperglycemia, with long-term current use of insulin ([facility]), and Weakness.  No other pertinent PMH on review with patient/guardian.     has no past surgical history on file.  No other pertinent PSH on review with patient/guardian.  Social History     Socioeconomic History    Marital status:      Spouse name: Not on file    Number of children: Not on file    Years of education: Not on file    Highest education level: Not on file   Occupational History    Not on file   Tobacco Use    Smoking status: Never

## 2025-01-09 NOTE — DISCHARGE INSTRUCTIONS
She can take Pepcid once per day as needed for reflux/abdominal/chest pain    Avoid spicy foods, fast foods, pasta/red sauce, eating late at night, eating shortly before bedtime, dark sodas, alcohol, smoking, coffee as these can make your reflux symptoms worse.  Avoid taking ibuprofen or other NSAIDs or steroids    You can try Pepcid over the counter to help reduce acid/symptoms.     Follow-up with your regular doctor later this week if your symptoms or not improving.    Return to the emergency department for worsening or changing abdominal pain, chest pain, shortness of breath, lightheadedness, fevers or chills, back pain, weakness, bloody stools, or other concerns

## 2025-01-09 NOTE — ED TRIAGE NOTES
Pt by EMS from [facility] for Right side that radiates to back w/ +SOB. Pt 100% RA.   No meds given en route; EMS stated usually pt has UTI when this happens  A&O x4

## 2025-01-22 ENCOUNTER — HOSPITAL ENCOUNTER (EMERGENCY)
Age: 65
Discharge: NURSING, REHAB OR LONG-TERM CARE | End: 2025-01-22
Attending: STUDENT IN AN ORGANIZED HEALTH CARE EDUCATION/TRAINING PROGRAM
Payer: MEDICARE

## 2025-01-22 ENCOUNTER — APPOINTMENT (OUTPATIENT)
Dept: GENERAL RADIOLOGY | Age: 65
End: 2025-01-22
Payer: MEDICARE

## 2025-01-22 VITALS
HEIGHT: 68 IN | WEIGHT: 221 LBS | BODY MASS INDEX: 33.49 KG/M2 | OXYGEN SATURATION: 100 % | HEART RATE: 78 BPM | DIASTOLIC BLOOD PRESSURE: 63 MMHG | RESPIRATION RATE: 22 BRPM | SYSTOLIC BLOOD PRESSURE: 122 MMHG | TEMPERATURE: 97.7 F

## 2025-01-22 DIAGNOSIS — R05.1 ACUTE COUGH: Primary | ICD-10-CM

## 2025-01-22 DIAGNOSIS — J06.9 ACUTE UPPER RESPIRATORY INFECTION: ICD-10-CM

## 2025-01-22 LAB
INFLUENZA A BY PCR: NEGATIVE
INFLUENZA B BY PCR: NEGATIVE
SARS-COV-2 RDRP RESP QL NAA+PROBE: NOT DETECTED

## 2025-01-22 PROCEDURE — 99285 EMERGENCY DEPT VISIT HI MDM: CPT

## 2025-01-22 PROCEDURE — 6370000000 HC RX 637 (ALT 250 FOR IP): Performed by: STUDENT IN AN ORGANIZED HEALTH CARE EDUCATION/TRAINING PROGRAM

## 2025-01-22 PROCEDURE — 87502 INFLUENZA DNA AMP PROBE: CPT

## 2025-01-22 PROCEDURE — 87635 SARS-COV-2 COVID-19 AMP PRB: CPT

## 2025-01-22 PROCEDURE — 93005 ELECTROCARDIOGRAM TRACING: CPT | Performed by: STUDENT IN AN ORGANIZED HEALTH CARE EDUCATION/TRAINING PROGRAM

## 2025-01-22 PROCEDURE — 71045 X-RAY EXAM CHEST 1 VIEW: CPT

## 2025-01-22 RX ORDER — ACETAMINOPHEN 500 MG
1000 TABLET ORAL EVERY 6 HOURS PRN
Qty: 60 TABLET | Refills: 0 | Status: SHIPPED | OUTPATIENT
Start: 2025-01-22

## 2025-01-22 RX ORDER — ACETAMINOPHEN 500 MG
1000 TABLET ORAL ONCE
Status: COMPLETED | OUTPATIENT
Start: 2025-01-22 | End: 2025-01-22

## 2025-01-22 RX ORDER — IBUPROFEN 400 MG/1
400 TABLET, FILM COATED ORAL ONCE
Status: COMPLETED | OUTPATIENT
Start: 2025-01-22 | End: 2025-01-22

## 2025-01-22 RX ORDER — GUAIFENESIN/DEXTROMETHORPHAN 100-10MG/5
5 SYRUP ORAL 3 TIMES DAILY PRN
Qty: 120 ML | Refills: 0 | Status: SHIPPED | OUTPATIENT
Start: 2025-01-22 | End: 2025-02-01

## 2025-01-22 RX ORDER — DOXYCYCLINE HYCLATE 100 MG
100 TABLET ORAL 2 TIMES DAILY
Qty: 10 TABLET | Refills: 0 | Status: SHIPPED | OUTPATIENT
Start: 2025-01-22 | End: 2025-01-27

## 2025-01-22 RX ORDER — GUAIFENESIN/DEXTROMETHORPHAN 100-10MG/5
5 SYRUP ORAL ONCE
Status: COMPLETED | OUTPATIENT
Start: 2025-01-22 | End: 2025-01-22

## 2025-01-22 RX ORDER — DOXYCYCLINE 100 MG/1
100 CAPSULE ORAL ONCE
Status: COMPLETED | OUTPATIENT
Start: 2025-01-22 | End: 2025-01-22

## 2025-01-22 RX ADMIN — IBUPROFEN 400 MG: 400 TABLET, FILM COATED ORAL at 08:11

## 2025-01-22 RX ADMIN — DOXYCYCLINE HYCLATE 100 MG: 100 CAPSULE ORAL at 08:47

## 2025-01-22 RX ADMIN — AMOXICILLIN AND CLAVULANATE POTASSIUM 1 TABLET: 875; 125 TABLET, FILM COATED ORAL at 08:47

## 2025-01-22 RX ADMIN — GUAIFENESIN SYRUP AND DEXTROMETHORPHAN 5 ML: 100; 10 SYRUP ORAL at 08:11

## 2025-01-22 RX ADMIN — ACETAMINOPHEN 1000 MG: 500 TABLET ORAL at 08:11

## 2025-01-22 ASSESSMENT — PAIN DESCRIPTION - LOCATION: LOCATION: CHEST;HIP

## 2025-01-22 ASSESSMENT — PAIN DESCRIPTION - ORIENTATION: ORIENTATION: RIGHT;LEFT

## 2025-01-22 ASSESSMENT — PAIN SCALES - GENERAL: PAINLEVEL_OUTOF10: 8

## 2025-01-22 NOTE — ED PROVIDER NOTES
none  Procedures    LABS:  Labs Reviewed   COVID-19, RAPID   RAPID INFLUENZA A/B ANTIGENS       XR CHEST PORTABLE   Final Result   No acute process.             ED Course as of 01/22/25 0841   Wed Jan 22, 2025   0807 EKG 12 Lead  EKG showing normal sinus rhythm, rate of 72 bpm.  Left axis deviation.  Normal intervals.  No gross acute ST-T wave abnormalities [NA]   0821 XR CHEST PORTABLE  Possible LLL infiltrate. Otherwise CXR showing no gross acute cardiopulmonary abnormalities. [NA]      ED Course User Index  [NA] Aayush Felipe MD       64 y.o. female with a PMH clinically significant for Frequent falls, Chronic gait instability regularly using walker, Neuropathy, HTN, DM II, CKD, Obesity, Hypothyroidism, OA, and Dementia presenting to the ED via EMS c/o cough, congestion, shortness of breath, fatigue and subjective fevers which she states has been ongoing for the past 1 to 6 days.  Upon initial evaluation, Pt Afebrile, HDS and in NAD. PE as noted above.  Labs, EKG, and Imaging visualized and interpreted by myself as noted above in ED Course.  Given findings, clinical presentation most likely consistent w/ cough and congestion likely secondary to URI versus pneumonia with possible left lower lobe opacity appreciated on chest x-ray.  Patient largely stable appearing in the ED.  EKG nonischemic and without evidence of acute arrhythmias.  Rapid viral testing, COVID/flu negative.  Will cover for possible pneumonia out of an abundance of caution.  Doxycycline and Augmentin administered in the ED.  Meds as noted below for symptomatic relief as well.  Stable for further evaluation and management as an outpatient.  Pt was administered   Medications   doxycycline hyclate (VIBRAMYCIN) capsule 100 mg (has no administration in time range)   amoxicillin-clavulanate (AUGMENTIN) 875-125 MG per tablet 1 tablet (has no administration in time range)   guaiFENesin-dextromethorphan (ROBITUSSIN DM) 100-10 MG/5ML syrup 5 mL (5

## 2025-01-22 NOTE — ED NOTES
Contacted [facility] for patients pharmacy. No answer from nurse, pharmacy or manager. Awaiting call back.

## 2025-01-23 ENCOUNTER — OFFICE VISIT (OUTPATIENT)
Dept: GERIATRIC MEDICINE | Age: 65
End: 2025-01-23
Payer: MEDICARE

## 2025-01-23 DIAGNOSIS — E11.65 POORLY CONTROLLED TYPE 2 DIABETES MELLITUS WITH NEUROPATHY (HCC): ICD-10-CM

## 2025-01-23 DIAGNOSIS — Z91.199 NON-COMPLIANCE: Primary | ICD-10-CM

## 2025-01-23 DIAGNOSIS — E11.40 POORLY CONTROLLED TYPE 2 DIABETES MELLITUS WITH NEUROPATHY (HCC): ICD-10-CM

## 2025-01-23 DIAGNOSIS — F33.1 MODERATE EPISODE OF RECURRENT MAJOR DEPRESSIVE DISORDER (HCC): ICD-10-CM

## 2025-01-23 LAB
EKG ATRIAL RATE: 72 BPM
EKG P AXIS: 118 DEGREES
EKG P-R INTERVAL: 196 MS
EKG Q-T INTERVAL: 414 MS
EKG QRS DURATION: 100 MS
EKG QTC CALCULATION (BAZETT): 453 MS
EKG R AXIS: -39 DEGREES
EKG T AXIS: 48 DEGREES
EKG VENTRICULAR RATE: 72 BPM

## 2025-01-23 PROCEDURE — 1123F ACP DISCUSS/DSCN MKR DOCD: CPT | Performed by: NURSE PRACTITIONER

## 2025-01-23 PROCEDURE — 99349 HOME/RES VST EST MOD MDM 40: CPT | Performed by: NURSE PRACTITIONER

## 2025-01-24 ENCOUNTER — APPOINTMENT (OUTPATIENT)
Dept: GENERAL RADIOLOGY | Age: 65
End: 2025-01-24
Payer: MEDICARE

## 2025-01-24 ENCOUNTER — HOSPITAL ENCOUNTER (EMERGENCY)
Age: 65
Discharge: HOME | End: 2025-01-24
Attending: STUDENT IN AN ORGANIZED HEALTH CARE EDUCATION/TRAINING PROGRAM

## 2025-01-24 ENCOUNTER — HOSPITAL ENCOUNTER (EMERGENCY)
Age: 65
Discharge: HOME | End: 2025-01-24
Attending: STUDENT IN AN ORGANIZED HEALTH CARE EDUCATION/TRAINING PROGRAM
Payer: MEDICARE

## 2025-01-24 VITALS
HEART RATE: 72 BPM | SYSTOLIC BLOOD PRESSURE: 138 MMHG | RESPIRATION RATE: 16 BRPM | OXYGEN SATURATION: 94 % | DIASTOLIC BLOOD PRESSURE: 76 MMHG

## 2025-01-24 VITALS
TEMPERATURE: 98.5 F | HEART RATE: 90 BPM | SYSTOLIC BLOOD PRESSURE: 146 MMHG | DIASTOLIC BLOOD PRESSURE: 84 MMHG | WEIGHT: 198.7 LBS | HEIGHT: 68 IN | OXYGEN SATURATION: 96 % | BODY MASS INDEX: 30.11 KG/M2 | RESPIRATION RATE: 22 BRPM

## 2025-01-24 DIAGNOSIS — R05.1 ACUTE COUGH: Primary | ICD-10-CM

## 2025-01-24 PROCEDURE — 99284 EMERGENCY DEPT VISIT MOD MDM: CPT

## 2025-01-24 PROCEDURE — 6370000000 HC RX 637 (ALT 250 FOR IP): Performed by: STUDENT IN AN ORGANIZED HEALTH CARE EDUCATION/TRAINING PROGRAM

## 2025-01-24 PROCEDURE — 71045 X-RAY EXAM CHEST 1 VIEW: CPT

## 2025-01-24 PROCEDURE — 93005 ELECTROCARDIOGRAM TRACING: CPT | Performed by: STUDENT IN AN ORGANIZED HEALTH CARE EDUCATION/TRAINING PROGRAM

## 2025-01-24 RX ORDER — GUAIFENESIN/DEXTROMETHORPHAN 100-10MG/5
5 SYRUP ORAL ONCE
Status: COMPLETED | OUTPATIENT
Start: 2025-01-24 | End: 2025-01-24

## 2025-01-24 RX ORDER — DOXYCYCLINE 100 MG/1
100 CAPSULE ORAL ONCE
Status: COMPLETED | OUTPATIENT
Start: 2025-01-24 | End: 2025-01-24

## 2025-01-24 RX ADMIN — AMOXICILLIN AND CLAVULANATE POTASSIUM 1 TABLET: 875; 125 TABLET, FILM COATED ORAL at 17:23

## 2025-01-24 RX ADMIN — DOXYCYCLINE HYCLATE 100 MG: 100 CAPSULE ORAL at 17:23

## 2025-01-24 RX ADMIN — GUAIFENESIN SYRUP AND DEXTROMETHORPHAN 5 ML: 100; 10 SYRUP ORAL at 17:25

## 2025-01-24 ASSESSMENT — LIFESTYLE VARIABLES
HOW OFTEN DO YOU HAVE A DRINK CONTAINING ALCOHOL: NEVER
HOW MANY STANDARD DRINKS CONTAINING ALCOHOL DO YOU HAVE ON A TYPICAL DAY: PATIENT DOES NOT DRINK
HOW MANY STANDARD DRINKS CONTAINING ALCOHOL DO YOU HAVE ON A TYPICAL DAY: PATIENT DOES NOT DRINK

## 2025-01-24 ASSESSMENT — PAIN DESCRIPTION - PAIN TYPE: TYPE: ACUTE PAIN

## 2025-01-24 ASSESSMENT — PAIN DESCRIPTION - LOCATION: LOCATION: CHEST;BACK

## 2025-01-24 ASSESSMENT — PAIN - FUNCTIONAL ASSESSMENT: PAIN_FUNCTIONAL_ASSESSMENT: 0-10

## 2025-01-24 ASSESSMENT — PAIN SCALES - GENERAL: PAINLEVEL_OUTOF10: 9

## 2025-01-24 NOTE — ED TRIAGE NOTES
Patient arrived to ED from [facility]. EMS reported C/O productive cough. EMS reported that patient was seen at ED two days ago for cough, but patient stated that she was not getting better. Patient stated that she was taken OTC Robitussin but facility stopped giving it to her. Patient stated that she called EMS because she was coughing and in pain at SNF and stated that no one would come help her for three hours so vinicius called EMS. Patient stated that she has chest pain and left hip pain. Patient rates pain level of 9 out of 10.

## 2025-01-24 NOTE — DISCHARGE INSTRUCTIONS
Please continue giving her the cough medicine antibiotics as prescribed and to completion    Return to the emergency department for chest pain, shortness of breath, coughing up blood, fevers, confusion, lightheadedness, urinary complaints or other worsening symptoms or concerns

## 2025-01-24 NOTE — ED PROVIDER NOTES
[facility] EMERGENCY DEPARTMENT  Emergency Department Encounter  Emergency Medicine      Pt Name: Gemini Molina  MRN:[id number]  Birthdate 1960  Date of evaluation: 1/24/25  Time: 4:48 PM EST  PCP:  Jhonatan Donovan MD    CHIEF COMPLAINT       Chief Complaint   Patient presents with    Cough       HISTORY OF PRESENT ILLNESS  (Location/Symptom, Timing/Onset, Context/Setting, Quality, Duration, ModifyingFactors, Severity.)      Gemini Molina is a 64 y.o. female presents from nursing home with complaints of cough.  She said she was here couple days ago started antibiotics and went back to the nursing home.  She is here today because she said that they are not giving her the antibiotics at the nursing home or the cough medicine and she thinks that she needs them.  She denies any chest pain or shortness of breath or hemoptysis or abdominal pain or nausea vomiting or fevers.    PAST MEDICAL / SURGICAL / SOCIAL /FAMILY HISTORY      has a past medical history of Acute cystitis with hematuria, Acute pain due to trauma, Agitation due to dementia ([facility]), JACOB (acute kidney injury) ([facility]), Fall, Hypothyroidism, IBS (irritable bowel syndrome), Neuropathy, Poorly controlled type 2 diabetes mellitus with neuropathy ([facility]), Primary hypertension, Uncontrolled type 2 diabetes mellitus with hyperglycemia, with long-term current use of insulin ([facility]), and Weakness.  No other pertinent PMH on review with patient/guardian.     has no past surgical history on file.  No other pertinent PSH on review with patient/guardian.  Social History     Socioeconomic History    Marital status:      Spouse name: Not on file    Number of children: Not on file    Years of education: Not on file    Highest education level: Not on file   Occupational History    Not on file   Tobacco Use    Smoking status: Never    Smokeless tobacco: Never   Substance and Sexual Activity    Alcohol use: Never    Drug use: Not on file    Sexual activity:    Skin:     General: Skin is warm and dry.   Neurological:      General: No focal deficit present.      Mental Status: She is alert and oriented to person, place, and time.         DIFFERENTIAL  DIAGNOSIS     PLAN (LABS / IMAGING / EKG):  Orders Placed This Encounter   Procedures    XR CHEST PORTABLE    EKG 12 Lead       MEDICATIONS ORDERED:  Orders Placed This Encounter   Medications    doxycycline hyclate (VIBRAMYCIN) capsule 100 mg     Order Specific Question:   Antimicrobial Indications     Answer:   Pneumonia (CAP)    amoxicillin-clavulanate (AUGMENTIN) 875-125 MG per tablet 1 tablet     Order Specific Question:   Antimicrobial Indications     Answer:   Pneumonia (CAP)    guaiFENesin-dextromethorphan (ROBITUSSIN DM) 100-10 MG/5ML syrup 5 mL           DIAGNOSTIC RESULTS / EMERGENCY DEPARTMENT COURSE / MDM     LABS:  No results found for this visit on 01/24/25.      IMPRESSION/MDM/ED COURSE:  64 y.o. female presented with acute cough  Differential: Pneumonia, viral URI, not septic, not ACS, no other signs of infection    Chest x-ray showed no significant findings      Discussed with patient, she would like to try her cough medicine and received for antibiotics, we also relayed to the nursing home regarding giving her antibiotics to ensure she is getting the proper intervals patient was given her Robitussin doxycycline and Augmentin that she was prescribed for, she appreciated this and felt well to go home, will be discharged back to the nursing home        ED Course as of 01/24/25 1906   Fri Jan 24, 2025   1703 EKG my interpretation shows sinus rhythm with history AV block, otherwise normal intervals, left axis deviation, no significant ST segment or T wave abnormalities [SF]      ED Course User Index  [SF] Diego Ortega DO     Patient/Guardian requesting discharge. Patient/Guardian was given written and verbal instructions prior to discharge. Patient/Guardian understood and agreed. Patient/Guardian had no

## 2025-01-25 DIAGNOSIS — G62.9 NEUROPATHY: ICD-10-CM

## 2025-01-25 RX ORDER — PREGABALIN 50 MG/1
50 CAPSULE ORAL 2 TIMES DAILY
Qty: 120 CAPSULE | Refills: 0 | Status: SHIPPED | OUTPATIENT
Start: 2025-01-25 | End: 2025-03-26

## 2025-01-25 NOTE — ED NOTES
Pt in bed resting with eyes closed, RR normal, no sxs fof distress. Call light within reach, bed in lowest position.

## 2025-01-25 NOTE — ED NOTES
Pt received discharge paperwork along prescriptions sent to pharmacy and follow up appointments. Pt verbalized understanding and had no questions or concerns at this time. Pt IV was removed, catheter intact and dressing applied. Pt A/Ox4, VSS, ABC intact, and no sxs of acute distress. Pt ambulated with no difficulty to home.

## 2025-01-27 LAB
EKG ATRIAL RATE: 88 BPM
EKG P AXIS: 76 DEGREES
EKG P-R INTERVAL: 232 MS
EKG Q-T INTERVAL: 382 MS
EKG QRS DURATION: 96 MS
EKG QTC CALCULATION (BAZETT): 462 MS
EKG R AXIS: -51 DEGREES
EKG T AXIS: 59 DEGREES
EKG VENTRICULAR RATE: 88 BPM

## 2025-01-27 PROCEDURE — 93010 ELECTROCARDIOGRAM REPORT: CPT | Performed by: INTERNAL MEDICINE

## 2025-01-28 PROBLEM — F33.1 MODERATE EPISODE OF RECURRENT MAJOR DEPRESSIVE DISORDER (HCC): Status: ACTIVE | Noted: 2025-01-28

## 2025-01-28 NOTE — PROGRESS NOTES
[facility]- [address]  [place] [address]    1/23/2025    Gemini Molina  is a 64 y.o. in the [facility] being seen for    Chief Complaint   Patient presents with    Follow-up       Gemini Molina is a 64-year-old female residing at [facility] assisted living [facility].  Patient is being seen today for continued noncompliance with resident care, medications and incontinence issues.  Patient has called EMS for herself twice this month-1 for chest pain and 1 for cough.  Staff continues to report patient is incontinent of bowel and bladder and refuses care from staff.  Patient will often lay in her urine and feces for hours before allowing staff to help clean her up.  Patient no longer toilets herself or cody herself.  At time of visit patient resting in bed.  She is alert and oriented x 3.  Discussed with patient and son her decline in level of care since admission to assisted living about 1 year ago.  Patient and son have agreed to referrals being sent to local nursing homes to find long-term care placement in a nursing facility that will better suit the patient's needs for complete assistance with bathing and toileting and dressing.          Past Medical History:   Diagnosis Date    Acute cystitis with hematuria 07/10/2023    Acute pain due to trauma 03/19/2024    Agitation due to dementia ([facility]) 07/18/2023    JACOB (acute kidney injury) ([facility]) 07/10/2023    Fall 07/10/2023    Hypothyroidism 07/10/2023    IBS (irritable bowel syndrome)     Neuropathy 07/18/2023    Poorly controlled type 2 diabetes mellitus with neuropathy ([facility]) 07/10/2023    Primary hypertension 07/10/2023    Uncontrolled type 2 diabetes mellitus with hyperglycemia, with long-term current use of insulin ([facility]) 07/10/2023    Weakness 07/10/2023     No past surgical history on file.  No family history on file.  Social History     Socioeconomic History    Marital status:      Spouse name: Not on file    Number of children: Not on file    Years

## 2025-03-11 DIAGNOSIS — G62.9 NEUROPATHY: ICD-10-CM

## 2025-03-13 RX ORDER — PREGABALIN 50 MG/1
50 CAPSULE ORAL 2 TIMES DAILY
Qty: 60 CAPSULE | Refills: 0 | Status: SHIPPED | OUTPATIENT
Start: 2025-03-13 | End: 2025-04-12

## 2025-03-26 ENCOUNTER — NURSING HOME VISIT (OUTPATIENT)
Dept: POST ACUTE CARE | Facility: EXTERNAL LOCATION | Age: 65
End: 2025-03-26
Payer: COMMERCIAL

## 2025-03-26 DIAGNOSIS — R26.2 AMBULATORY DYSFUNCTION: ICD-10-CM

## 2025-03-26 DIAGNOSIS — E13.9 SECONDARY DM WITHOUT COMPLICATIONS: ICD-10-CM

## 2025-03-26 DIAGNOSIS — I10 PRIMARY HYPERTENSION: Primary | ICD-10-CM

## 2025-03-26 DIAGNOSIS — N18.30 STAGE 3 CHRONIC KIDNEY DISEASE, UNSPECIFIED WHETHER STAGE 3A OR 3B CKD (MULTI): ICD-10-CM

## 2025-03-26 PROCEDURE — 99305 1ST NF CARE MODERATE MDM 35: CPT | Performed by: STUDENT IN AN ORGANIZED HEALTH CARE EDUCATION/TRAINING PROGRAM

## 2025-03-27 PROBLEM — I10 PRIMARY HYPERTENSION: Status: ACTIVE | Noted: 2025-03-27

## 2025-03-27 PROBLEM — E13.9: Status: ACTIVE | Noted: 2025-03-27

## 2025-03-27 PROBLEM — N18.9 CKD (CHRONIC KIDNEY DISEASE): Status: ACTIVE | Noted: 2025-03-27

## 2025-03-27 PROBLEM — R26.2 AMBULATORY DYSFUNCTION: Status: ACTIVE | Noted: 2025-03-27

## 2025-03-27 ASSESSMENT — ENCOUNTER SYMPTOMS
RESPIRATORY NEGATIVE: 1
MUSCULOSKELETAL NEGATIVE: 1
GASTROINTESTINAL NEGATIVE: 1
FATIGUE: 1
PSYCHIATRIC NEGATIVE: 1
WEAKNESS: 1
CARDIOVASCULAR NEGATIVE: 1

## 2025-03-28 NOTE — PROGRESS NOTES
Subjective   Patient ID: Lisa Vu is a 64 y.o. female.    .Patient is a 64-year-old female with past medical history of diabetes, depression, ambulatory dysfunction, hypertension, CKD, ambulatory dysfunction and frequent falls and hypothyroidism, who was a transfer from assisted living facility.  Patient endorses that she is having too much issues with care at the previous facility as they could not care for her due to her multiple medical needs and subsequently was discharged to skilled nursing facility.  On evaluation, she endorses no acute issues or concerns and overall feels well.  States that her symptoms are overall stable.  Otherwise, and is tolerating her medications.  Endorses no acute issues or concerns at this time.         Review of Systems   Constitutional:  Positive for fatigue.   HENT: Negative.     Respiratory: Negative.     Cardiovascular: Negative.    Gastrointestinal: Negative.    Musculoskeletal: Negative.    Neurological:  Positive for weakness.   Psychiatric/Behavioral: Negative.         Objective Vitals Reviewed via facility EMR   Physical Exam  Constitutional:       General: She is not in acute distress.     Appearance: She is not ill-appearing.      Comments: Bedbound, plesant   Eyes:      Pupils: Pupils are equal, round, and reactive to light.   Cardiovascular:      Rate and Rhythm: Normal rate and regular rhythm.      Pulses: Normal pulses.      Heart sounds: No murmur heard.  Pulmonary:      Effort: No respiratory distress.      Breath sounds: No wheezing.   Abdominal:      General: Abdomen is flat. Bowel sounds are normal. There is no distension.   Musculoskeletal:      Right lower leg: No edema.      Left lower leg: No edema.   Skin:     General: Skin is warm and dry.   Neurological:      Mental Status: She is alert. Mental status is at baseline.      Cranial Nerves: No cranial nerve deficit.      Motor: Weakness present.   Psychiatric:         Mood and Affect: Mood normal.          Behavior: Behavior normal.         Assessment/Plan   Diagnoses and all orders for this visit:  Primary hypertension  Secondary DM without complications (Multi)  Stage 3 chronic kidney disease, unspecified whether stage 3a or 3b CKD (Multi)  Ambulatory dysfunction      Patient seen and examined at bedside.  Does have multiple medical needs with regards to her ADLs and general disease..  Medications reviewed and reconciled.  Will need to closely monitor blood sugars.  Obtain baseline labs to evaluate baseline consultation.  Otherwise, continue supportive care, discussed care plan with staff and no issues noted.     Reviewed and approved by FREDDY NAZARIO on 3/27/25 at 9:10 PM.

## 2025-04-10 ENCOUNTER — NURSING HOME VISIT (OUTPATIENT)
Dept: POST ACUTE CARE | Facility: EXTERNAL LOCATION | Age: 65
End: 2025-04-10
Payer: COMMERCIAL

## 2025-04-10 DIAGNOSIS — I10 PRIMARY HYPERTENSION: Primary | ICD-10-CM

## 2025-04-10 DIAGNOSIS — E13.9 SECONDARY DM WITHOUT COMPLICATIONS: ICD-10-CM

## 2025-04-10 DIAGNOSIS — R26.2 AMBULATORY DYSFUNCTION: ICD-10-CM

## 2025-04-10 DIAGNOSIS — N18.30 STAGE 3 CHRONIC KIDNEY DISEASE, UNSPECIFIED WHETHER STAGE 3A OR 3B CKD (MULTI): ICD-10-CM

## 2025-04-12 ASSESSMENT — ENCOUNTER SYMPTOMS
CONSTITUTIONAL NEGATIVE: 1
PSYCHIATRIC NEGATIVE: 1
GASTROINTESTINAL NEGATIVE: 1
NEUROLOGICAL NEGATIVE: 1
RESPIRATORY NEGATIVE: 1
CARDIOVASCULAR NEGATIVE: 1
MUSCULOSKELETAL NEGATIVE: 1

## 2025-04-12 NOTE — PROGRESS NOTES
Subjective   Patient ID: Lisa Vu is a 64 y.o. female.    Patient seen and examined on routine evaluation, she regards that she is overall doing well without any acute issues or concerns.  States that she is tolerating her medications and is not having any side effects with regards to these.  With regards to activities of daily living and overall care, she voices no acute concerns.  Otherwise, feels overall well.  He is adjusting to the facility well.         Review of Systems   Constitutional: Negative.    HENT: Negative.     Respiratory: Negative.     Cardiovascular: Negative.    Gastrointestinal: Negative.    Musculoskeletal: Negative.    Neurological: Negative.    Psychiatric/Behavioral: Negative.         Objective Vitals Reviewed via facility EMR   Physical Exam  Constitutional:       General: She is not in acute distress.     Appearance: She is not ill-appearing.   Eyes:      Pupils: Pupils are equal, round, and reactive to light.   Cardiovascular:      Rate and Rhythm: Normal rate and regular rhythm.      Pulses: Normal pulses.      Heart sounds: No murmur heard.  Pulmonary:      Effort: No respiratory distress.      Breath sounds: No wheezing.   Abdominal:      General: Abdomen is flat. Bowel sounds are normal. There is no distension.   Musculoskeletal:      Right lower leg: No edema.      Left lower leg: No edema.   Skin:     General: Skin is warm and dry.   Neurological:      Mental Status: She is alert. Mental status is at baseline.      Cranial Nerves: No cranial nerve deficit.      Motor: Weakness present.   Psychiatric:         Mood and Affect: Mood normal.         Behavior: Behavior normal.         Assessment/Plan   Diagnoses and all orders for this visit:  Primary hypertension  Secondary DM without complications  Stage 3 chronic kidney disease, unspecified whether stage 3a or 3b CKD (Multi)  Ambulatory dysfunction    Patient seen and examined at bedside.  Overall medically stable, labs and  vitals reviewed and no issues noted.  Continue optimization of activities of daily living, no change in medications at this time, discussed care plan with staff and no additional issues at this time.   Reviewed and approved by FREDDY NAZARIO on 4/12/25 at 1:45 PM.

## 2025-04-24 ENCOUNTER — NURSING HOME VISIT (OUTPATIENT)
Dept: POST ACUTE CARE | Facility: EXTERNAL LOCATION | Age: 65
End: 2025-04-24
Payer: COMMERCIAL

## 2025-04-24 DIAGNOSIS — R26.2 AMBULATORY DYSFUNCTION: ICD-10-CM

## 2025-04-24 DIAGNOSIS — E13.9 SECONDARY DM WITHOUT COMPLICATIONS: ICD-10-CM

## 2025-04-24 DIAGNOSIS — I10 PRIMARY HYPERTENSION: ICD-10-CM

## 2025-04-24 DIAGNOSIS — N18.30 STAGE 3 CHRONIC KIDNEY DISEASE, UNSPECIFIED WHETHER STAGE 3A OR 3B CKD (MULTI): Primary | ICD-10-CM

## 2025-04-24 PROCEDURE — 99308 SBSQ NF CARE LOW MDM 20: CPT | Performed by: STUDENT IN AN ORGANIZED HEALTH CARE EDUCATION/TRAINING PROGRAM

## 2025-04-24 ASSESSMENT — ENCOUNTER SYMPTOMS
RESPIRATORY NEGATIVE: 1
MUSCULOSKELETAL NEGATIVE: 1
CONSTITUTIONAL NEGATIVE: 1
PSYCHIATRIC NEGATIVE: 1
GASTROINTESTINAL NEGATIVE: 1
CARDIOVASCULAR NEGATIVE: 1
NEUROLOGICAL NEGATIVE: 1

## 2025-04-24 NOTE — PROGRESS NOTES
Subjective   Patient ID: Lisa Vu is a 64 y.o. female.    Patient seen on routine evaluation at her facility request.  She regards that she is overall doing well without any acute issues or concerns.  Labs and vitals have overall been stable, and she states that all her issues at the facility have been appropriately addressed.  States otherwise, regards no acute complaints or concerns and overall feels well.        Review of Systems   Constitutional: Negative.    HENT: Negative.     Respiratory: Negative.     Cardiovascular: Negative.    Gastrointestinal: Negative.    Musculoskeletal: Negative.    Neurological: Negative.    Psychiatric/Behavioral: Negative.         Objective Vitals Reviewed via facility EMR   Physical Exam  Constitutional:       General: She is not in acute distress.     Appearance: She is not ill-appearing.      Comments: In bed, pelsant   Eyes:      Pupils: Pupils are equal, round, and reactive to light.   Cardiovascular:      Rate and Rhythm: Normal rate and regular rhythm.      Pulses: Normal pulses.      Heart sounds: No murmur heard.  Pulmonary:      Effort: No respiratory distress.      Breath sounds: No wheezing.   Abdominal:      General: Abdomen is flat. Bowel sounds are normal. There is no distension.   Musculoskeletal:      Right lower leg: No edema.      Left lower leg: No edema.   Skin:     General: Skin is warm and dry.   Neurological:      Mental Status: She is alert. Mental status is at baseline.      Cranial Nerves: No cranial nerve deficit.      Motor: No weakness.   Psychiatric:         Mood and Affect: Mood normal.         Behavior: Behavior normal.         Assessment/Plan   Diagnoses and all orders for this visit:  Stage 3 chronic kidney disease, unspecified whether stage 3a or 3b CKD (Multi)  Ambulatory dysfunction  Primary hypertension  Secondary DM without complications    Patient seen and examined overall medically.  Stable continue supportive care, no change in  medications at this time labs and vitals reviewed and no issues noted.  Discussed care plan with staff and no additional issues.     Reviewed and approved by FREDDY NAZARIO on 4/24/25 at 3:22 PM.

## 2025-04-24 NOTE — LETTER
Patient: Lisa Vu  : 1960    Encounter Date: 2025    Subjective  Patient ID: Lisa Vu is a 64 y.o. female.    Patient seen on routine evaluation at her facility request.  She regards that she is overall doing well without any acute issues or concerns.  Labs and vitals have overall been stable, and she states that all her issues at the facility have been appropriately addressed.  States otherwise, regards no acute complaints or concerns and overall feels well.        Review of Systems   Constitutional: Negative.    HENT: Negative.     Respiratory: Negative.     Cardiovascular: Negative.    Gastrointestinal: Negative.    Musculoskeletal: Negative.    Neurological: Negative.    Psychiatric/Behavioral: Negative.         ObjectiveVitals Reviewed via facility EMR   Physical Exam  Constitutional:       General: She is not in acute distress.     Appearance: She is not ill-appearing.      Comments: In bed, pelsant   Eyes:      Pupils: Pupils are equal, round, and reactive to light.   Cardiovascular:      Rate and Rhythm: Normal rate and regular rhythm.      Pulses: Normal pulses.      Heart sounds: No murmur heard.  Pulmonary:      Effort: No respiratory distress.      Breath sounds: No wheezing.   Abdominal:      General: Abdomen is flat. Bowel sounds are normal. There is no distension.   Musculoskeletal:      Right lower leg: No edema.      Left lower leg: No edema.   Skin:     General: Skin is warm and dry.   Neurological:      Mental Status: She is alert. Mental status is at baseline.      Cranial Nerves: No cranial nerve deficit.      Motor: No weakness.   Psychiatric:         Mood and Affect: Mood normal.         Behavior: Behavior normal.         Assessment/Plan  Diagnoses and all orders for this visit:  Stage 3 chronic kidney disease, unspecified whether stage 3a or 3b CKD (Multi)  Ambulatory dysfunction  Primary hypertension  Secondary DM without complications    Patient seen and  examined overall medically.  Stable continue supportive care, no change in medications at this time labs and vitals reviewed and no issues noted.  Discussed care plan with staff and no additional issues.     Reviewed and approved by JALEN NAZARIO on 4/24/25 at 3:22 PM.       Electronically Signed By: Jalen Nazario DO   4/24/25  3:22 PM

## 2025-05-02 ENCOUNTER — HOSPITAL ENCOUNTER (INPATIENT)
Facility: HOSPITAL | Age: 65
End: 2025-05-02
Attending: STUDENT IN AN ORGANIZED HEALTH CARE EDUCATION/TRAINING PROGRAM | Admitting: INTERNAL MEDICINE
Payer: COMMERCIAL

## 2025-05-02 ENCOUNTER — APPOINTMENT (OUTPATIENT)
Dept: RADIOLOGY | Facility: HOSPITAL | Age: 65
DRG: 871 | End: 2025-05-02
Payer: COMMERCIAL

## 2025-05-02 ENCOUNTER — APPOINTMENT (OUTPATIENT)
Dept: CARDIOLOGY | Facility: HOSPITAL | Age: 65
DRG: 871 | End: 2025-05-02
Payer: COMMERCIAL

## 2025-05-02 DIAGNOSIS — R41.82 ALTERED MENTAL STATUS, UNSPECIFIED ALTERED MENTAL STATUS TYPE: ICD-10-CM

## 2025-05-02 DIAGNOSIS — A41.9 SEVERE SEPSIS WITH ACUTE ORGAN DYSFUNCTION (MULTI): Primary | ICD-10-CM

## 2025-05-02 DIAGNOSIS — R65.20 SEVERE SEPSIS WITH ACUTE ORGAN DYSFUNCTION (MULTI): Primary | ICD-10-CM

## 2025-05-02 DIAGNOSIS — B49 FUNGEMIA: ICD-10-CM

## 2025-05-02 DIAGNOSIS — E86.1 HYPOVOLEMIA DEHYDRATION: ICD-10-CM

## 2025-05-02 DIAGNOSIS — I10 PRIMARY HYPERTENSION: ICD-10-CM

## 2025-05-02 DIAGNOSIS — N39.0 URINARY TRACT INFECTION IN FEMALE: ICD-10-CM

## 2025-05-02 PROBLEM — N30.91 CYSTITIS WITH HEMATURIA: Status: ACTIVE | Noted: 2025-05-02

## 2025-05-02 PROBLEM — R26.2 AMBULATORY DYSFUNCTION: Status: RESOLVED | Noted: 2025-03-27 | Resolved: 2025-05-02

## 2025-05-02 PROBLEM — N18.9 CKD (CHRONIC KIDNEY DISEASE): Status: RESOLVED | Noted: 2025-03-27 | Resolved: 2025-05-02

## 2025-05-02 PROBLEM — E13.9: Status: RESOLVED | Noted: 2025-03-27 | Resolved: 2025-05-02

## 2025-05-02 PROBLEM — G92.8 TOXIC METABOLIC ENCEPHALOPATHY: Status: ACTIVE | Noted: 2025-05-02

## 2025-05-02 LAB
ALBUMIN SERPL BCP-MCNC: 3.2 G/DL (ref 3.4–5)
ALP SERPL-CCNC: 70 U/L (ref 33–136)
ALT SERPL W P-5'-P-CCNC: 9 U/L (ref 7–45)
ANION GAP SERPL CALC-SCNC: 16 MMOL/L (ref 10–20)
APPEARANCE UR: ABNORMAL
APTT PPP: 27 SECONDS (ref 26–36)
AST SERPL W P-5'-P-CCNC: 13 U/L (ref 9–39)
BACTERIA #/AREA URNS AUTO: ABNORMAL /HPF
BASOPHILS # BLD AUTO: 0.05 X10*3/UL (ref 0–0.1)
BASOPHILS NFR BLD AUTO: 0.3 %
BILIRUB SERPL-MCNC: 0.7 MG/DL (ref 0–1.2)
BILIRUB UR STRIP.AUTO-MCNC: NEGATIVE MG/DL
BUN SERPL-MCNC: 15 MG/DL (ref 6–23)
CALCIUM SERPL-MCNC: 8.5 MG/DL (ref 8.6–10.3)
CARDIAC TROPONIN I PNL SERPL HS: 6 NG/L (ref 0–13)
CHLORIDE SERPL-SCNC: 101 MMOL/L (ref 98–107)
CO2 SERPL-SCNC: 24 MMOL/L (ref 21–32)
COLOR UR: YELLOW
CREAT SERPL-MCNC: 0.96 MG/DL (ref 0.5–1.05)
EGFRCR SERPLBLD CKD-EPI 2021: 66 ML/MIN/1.73M*2
EOSINOPHIL # BLD AUTO: 0.02 X10*3/UL (ref 0–0.7)
EOSINOPHIL NFR BLD AUTO: 0.1 %
ERYTHROCYTE [DISTWIDTH] IN BLOOD BY AUTOMATED COUNT: 15.2 % (ref 11.5–14.5)
GLUCOSE BLD MANUAL STRIP-MCNC: 222 MG/DL (ref 74–99)
GLUCOSE BLD MANUAL STRIP-MCNC: 222 MG/DL (ref 74–99)
GLUCOSE SERPL-MCNC: 223 MG/DL (ref 74–99)
GLUCOSE UR STRIP.AUTO-MCNC: ABNORMAL MG/DL
HCT VFR BLD AUTO: 40.7 % (ref 36–46)
HGB BLD-MCNC: 13.2 G/DL (ref 12–16)
IMM GRANULOCYTES # BLD AUTO: 0.16 X10*3/UL (ref 0–0.7)
IMM GRANULOCYTES NFR BLD AUTO: 0.9 % (ref 0–0.9)
INR PPP: 1.1 (ref 0.9–1.1)
KETONES UR STRIP.AUTO-MCNC: ABNORMAL MG/DL
LACTATE SERPL-SCNC: 2.5 MMOL/L (ref 0.4–2)
LACTATE SERPL-SCNC: 2.7 MMOL/L (ref 0.4–2)
LEUKOCYTE ESTERASE UR QL STRIP.AUTO: ABNORMAL
LYMPHOCYTES # BLD AUTO: 0.65 X10*3/UL (ref 1.2–4.8)
LYMPHOCYTES NFR BLD AUTO: 3.5 %
MAGNESIUM SERPL-MCNC: 1.51 MG/DL (ref 1.6–2.4)
MCH RBC QN AUTO: 28 PG (ref 26–34)
MCHC RBC AUTO-ENTMCNC: 32.4 G/DL (ref 32–36)
MCV RBC AUTO: 86 FL (ref 80–100)
MONOCYTES # BLD AUTO: 1.05 X10*3/UL (ref 0.1–1)
MONOCYTES NFR BLD AUTO: 5.7 %
MUCOUS THREADS #/AREA URNS AUTO: ABNORMAL /LPF
NEUTROPHILS # BLD AUTO: 16.39 X10*3/UL (ref 1.2–7.7)
NEUTROPHILS NFR BLD AUTO: 89.5 %
NITRITE UR QL STRIP.AUTO: NEGATIVE
NRBC BLD-RTO: 0 /100 WBCS (ref 0–0)
PH UR STRIP.AUTO: 5 [PH]
PHOSPHATE SERPL-MCNC: 3 MG/DL (ref 2.5–4.9)
PLATELET # BLD AUTO: 314 X10*3/UL (ref 150–450)
POTASSIUM SERPL-SCNC: 3.7 MMOL/L (ref 3.5–5.3)
PROT SERPL-MCNC: 6.7 G/DL (ref 6.4–8.2)
PROT UR STRIP.AUTO-MCNC: ABNORMAL MG/DL
PROTHROMBIN TIME: 12.2 SECONDS (ref 9.8–12.4)
RBC # BLD AUTO: 4.72 X10*6/UL (ref 4–5.2)
RBC # UR STRIP.AUTO: ABNORMAL MG/DL
RBC #/AREA URNS AUTO: >20 /HPF
SODIUM SERPL-SCNC: 137 MMOL/L (ref 136–145)
SP GR UR STRIP.AUTO: 1.01
TSH SERPL-ACNC: 2.82 MIU/L (ref 0.44–3.98)
UROBILINOGEN UR STRIP.AUTO-MCNC: ABNORMAL MG/DL
WBC # BLD AUTO: 18.3 X10*3/UL (ref 4.4–11.3)
WBC #/AREA URNS AUTO: >50 /HPF
WBC CLUMPS #/AREA URNS AUTO: ABNORMAL /HPF

## 2025-05-02 PROCEDURE — 82947 ASSAY GLUCOSE BLOOD QUANT: CPT

## 2025-05-02 PROCEDURE — 70498 CT ANGIOGRAPHY NECK: CPT | Performed by: RADIOLOGY

## 2025-05-02 PROCEDURE — 84484 ASSAY OF TROPONIN QUANT: CPT

## 2025-05-02 PROCEDURE — 96365 THER/PROPH/DIAG IV INF INIT: CPT

## 2025-05-02 PROCEDURE — 2500000001 HC RX 250 WO HCPCS SELF ADMINISTERED DRUGS (ALT 637 FOR MEDICARE OP): Performed by: NURSE PRACTITIONER

## 2025-05-02 PROCEDURE — 70498 CT ANGIOGRAPHY NECK: CPT

## 2025-05-02 PROCEDURE — 87086 URINE CULTURE/COLONY COUNT: CPT | Mod: STJLAB

## 2025-05-02 PROCEDURE — 2500000002 HC RX 250 W HCPCS SELF ADMINISTERED DRUGS (ALT 637 FOR MEDICARE OP, ALT 636 FOR OP/ED): Performed by: NURSE PRACTITIONER

## 2025-05-02 PROCEDURE — 2060000001 HC INTERMEDIATE ICU ROOM DAILY

## 2025-05-02 PROCEDURE — 36415 COLL VENOUS BLD VENIPUNCTURE: CPT

## 2025-05-02 PROCEDURE — 99291 CRITICAL CARE FIRST HOUR: CPT | Performed by: STUDENT IN AN ORGANIZED HEALTH CARE EDUCATION/TRAINING PROGRAM

## 2025-05-02 PROCEDURE — 70496 CT ANGIOGRAPHY HEAD: CPT | Performed by: RADIOLOGY

## 2025-05-02 PROCEDURE — 81001 URINALYSIS AUTO W/SCOPE: CPT

## 2025-05-02 PROCEDURE — 74018 RADEX ABDOMEN 1 VIEW: CPT

## 2025-05-02 PROCEDURE — 80053 COMPREHEN METABOLIC PANEL: CPT

## 2025-05-02 PROCEDURE — P9047 ALBUMIN (HUMAN), 25%, 50ML: HCPCS | Mod: JZ | Performed by: NURSE PRACTITIONER

## 2025-05-02 PROCEDURE — 84100 ASSAY OF PHOSPHORUS: CPT | Performed by: NURSE PRACTITIONER

## 2025-05-02 PROCEDURE — 93005 ELECTROCARDIOGRAM TRACING: CPT

## 2025-05-02 PROCEDURE — 87081 CULTURE SCREEN ONLY: CPT | Mod: STJLAB | Performed by: NURSE PRACTITIONER

## 2025-05-02 PROCEDURE — 99291 CRITICAL CARE FIRST HOUR: CPT | Mod: 25

## 2025-05-02 PROCEDURE — 83735 ASSAY OF MAGNESIUM: CPT | Performed by: NURSE PRACTITIONER

## 2025-05-02 PROCEDURE — 2550000001 HC RX 255 CONTRASTS: Performed by: STUDENT IN AN ORGANIZED HEALTH CARE EDUCATION/TRAINING PROGRAM

## 2025-05-02 PROCEDURE — 99223 1ST HOSP IP/OBS HIGH 75: CPT | Performed by: NURSE PRACTITIONER

## 2025-05-02 PROCEDURE — 70450 CT HEAD/BRAIN W/O DYE: CPT

## 2025-05-02 PROCEDURE — 84443 ASSAY THYROID STIM HORMONE: CPT | Performed by: NURSE PRACTITIONER

## 2025-05-02 PROCEDURE — 2500000004 HC RX 250 GENERAL PHARMACY W/ HCPCS (ALT 636 FOR OP/ED): Mod: JZ | Performed by: STUDENT IN AN ORGANIZED HEALTH CARE EDUCATION/TRAINING PROGRAM

## 2025-05-02 PROCEDURE — 83605 ASSAY OF LACTIC ACID: CPT

## 2025-05-02 PROCEDURE — 70450 CT HEAD/BRAIN W/O DYE: CPT | Performed by: RADIOLOGY

## 2025-05-02 PROCEDURE — 96375 TX/PRO/DX INJ NEW DRUG ADDON: CPT

## 2025-05-02 PROCEDURE — 85730 THROMBOPLASTIN TIME PARTIAL: CPT

## 2025-05-02 PROCEDURE — 96374 THER/PROPH/DIAG INJ IV PUSH: CPT | Mod: 59

## 2025-05-02 PROCEDURE — G0427 INPT/ED TELECONSULT70: HCPCS | Performed by: STUDENT IN AN ORGANIZED HEALTH CARE EDUCATION/TRAINING PROGRAM

## 2025-05-02 PROCEDURE — 85610 PROTHROMBIN TIME: CPT

## 2025-05-02 PROCEDURE — 96366 THER/PROPH/DIAG IV INF ADDON: CPT

## 2025-05-02 PROCEDURE — 87040 BLOOD CULTURE FOR BACTERIA: CPT | Mod: STJLAB

## 2025-05-02 PROCEDURE — 83605 ASSAY OF LACTIC ACID: CPT | Performed by: NURSE PRACTITIONER

## 2025-05-02 PROCEDURE — 99285 EMERGENCY DEPT VISIT HI MDM: CPT | Mod: 25 | Performed by: STUDENT IN AN ORGANIZED HEALTH CARE EDUCATION/TRAINING PROGRAM

## 2025-05-02 PROCEDURE — 85025 COMPLETE CBC W/AUTO DIFF WBC: CPT

## 2025-05-02 PROCEDURE — 85610 PROTHROMBIN TIME: CPT | Performed by: NURSE PRACTITIONER

## 2025-05-02 PROCEDURE — 2500000004 HC RX 250 GENERAL PHARMACY W/ HCPCS (ALT 636 FOR OP/ED): Mod: JZ | Performed by: NURSE PRACTITIONER

## 2025-05-02 PROCEDURE — 93010 ELECTROCARDIOGRAM REPORT: CPT | Performed by: INTERNAL MEDICINE

## 2025-05-02 PROCEDURE — 2500000004 HC RX 250 GENERAL PHARMACY W/ HCPCS (ALT 636 FOR OP/ED): Mod: JZ

## 2025-05-02 PROCEDURE — 74018 RADEX ABDOMEN 1 VIEW: CPT | Performed by: RADIOLOGY

## 2025-05-02 RX ORDER — GLYCERIN 1 G/1
1 SUPPOSITORY RECTAL DAILY PRN
Status: ON HOLD | COMMUNITY

## 2025-05-02 RX ORDER — ATORVASTATIN CALCIUM 10 MG/1
10 TABLET, FILM COATED ORAL NIGHTLY
Status: DISPENSED | OUTPATIENT
Start: 2025-05-02

## 2025-05-02 RX ORDER — GLUCAGON HCL 1 MG
1 VIAL (EA) INJECTION DAILY PRN
Status: ON HOLD | COMMUNITY

## 2025-05-02 RX ORDER — GUAIFENESIN 100 MG/5ML
200 LIQUID ORAL EVERY 4 HOURS PRN
Status: ON HOLD | COMMUNITY

## 2025-05-02 RX ORDER — ALUMINUM HYDROXIDE, MAGNESIUM HYDROXIDE, AND SIMETHICONE 1200; 120; 1200 MG/30ML; MG/30ML; MG/30ML
30 SUSPENSION ORAL EVERY 4 HOURS PRN
Status: ON HOLD | COMMUNITY

## 2025-05-02 RX ORDER — L. ACIDOPHILUS/L.BULGARICUS 1MM CELL
1 TABLET ORAL 2 TIMES DAILY
Status: DISPENSED | OUTPATIENT
Start: 2025-05-02

## 2025-05-02 RX ORDER — INSULIN LISPRO 100 [IU]/ML
0-5 INJECTION, SOLUTION INTRAVENOUS; SUBCUTANEOUS
Status: DISCONTINUED | OUTPATIENT
Start: 2025-05-02 | End: 2025-05-04

## 2025-05-02 RX ORDER — ALBUMIN HUMAN 250 G/1000ML
25 SOLUTION INTRAVENOUS ONCE
Status: COMPLETED | OUTPATIENT
Start: 2025-05-02 | End: 2025-05-03

## 2025-05-02 RX ORDER — METOPROLOL SUCCINATE 25 MG/1
25 TABLET, EXTENDED RELEASE ORAL DAILY
Status: DISPENSED | OUTPATIENT
Start: 2025-05-03

## 2025-05-02 RX ORDER — DOCUSATE SODIUM 100 MG/1
100 CAPSULE, LIQUID FILLED ORAL 2 TIMES DAILY PRN
Status: ON HOLD | COMMUNITY

## 2025-05-02 RX ORDER — AMITRIPTYLINE HYDROCHLORIDE 10 MG/1
10 TABLET, FILM COATED ORAL NIGHTLY
Status: ON HOLD | COMMUNITY

## 2025-05-02 RX ORDER — SODIUM CHLORIDE, SODIUM LACTATE, POTASSIUM CHLORIDE, CALCIUM CHLORIDE 600; 310; 30; 20 MG/100ML; MG/100ML; MG/100ML; MG/100ML
100 INJECTION, SOLUTION INTRAVENOUS CONTINUOUS
Status: ACTIVE | OUTPATIENT
Start: 2025-05-02 | End: 2025-05-03

## 2025-05-02 RX ORDER — ACETAMINOPHEN 160 MG/5ML
650 SOLUTION ORAL EVERY 4 HOURS PRN
Status: ACTIVE | OUTPATIENT
Start: 2025-05-02

## 2025-05-02 RX ORDER — LISINOPRIL 20 MG/1
20 TABLET ORAL DAILY
Status: ON HOLD | COMMUNITY

## 2025-05-02 RX ORDER — PETROLATUM 420 MG/G
OINTMENT TOPICAL
Status: DISPENSED | OUTPATIENT
Start: 2025-05-02

## 2025-05-02 RX ORDER — PREGABALIN 50 MG/1
50 CAPSULE ORAL 2 TIMES DAILY
Status: ON HOLD | COMMUNITY

## 2025-05-02 RX ORDER — TALC
3 POWDER (GRAM) TOPICAL NIGHTLY PRN
Status: DISPENSED | OUTPATIENT
Start: 2025-05-02

## 2025-05-02 RX ORDER — VANCOMYCIN/0.9 % SOD CHLORIDE 1.5G/250ML
1500 PLASTIC BAG, INJECTION (ML) INTRAVENOUS ONCE
Status: COMPLETED | OUTPATIENT
Start: 2025-05-02 | End: 2025-05-02

## 2025-05-02 RX ORDER — ATORVASTATIN CALCIUM 10 MG/1
10 TABLET, FILM COATED ORAL NIGHTLY
Status: ON HOLD | COMMUNITY

## 2025-05-02 RX ORDER — ONDANSETRON HYDROCHLORIDE 2 MG/ML
4 INJECTION, SOLUTION INTRAVENOUS EVERY 8 HOURS PRN
Status: ACTIVE | OUTPATIENT
Start: 2025-05-02

## 2025-05-02 RX ORDER — VENLAFAXINE 50 MG/1
50 TABLET ORAL NIGHTLY
Status: ON HOLD | COMMUNITY

## 2025-05-02 RX ORDER — NYSTATIN 100000 [USP'U]/G
1 POWDER TOPICAL 2 TIMES DAILY
Status: DISPENSED | OUTPATIENT
Start: 2025-05-02

## 2025-05-02 RX ORDER — METOCLOPRAMIDE 10 MG/1
10 TABLET ORAL EVERY 4 HOURS PRN
Status: ON HOLD | COMMUNITY

## 2025-05-02 RX ORDER — INSULIN GLARGINE 100 [IU]/ML
10 INJECTION, SOLUTION SUBCUTANEOUS DAILY
Status: DISPENSED | OUTPATIENT
Start: 2025-05-03

## 2025-05-02 RX ORDER — ONDANSETRON HYDROCHLORIDE 2 MG/ML
4 INJECTION, SOLUTION INTRAVENOUS ONCE
Status: COMPLETED | OUTPATIENT
Start: 2025-05-02 | End: 2025-05-02

## 2025-05-02 RX ORDER — ENOXAPARIN SODIUM 100 MG/ML
40 INJECTION SUBCUTANEOUS EVERY 24 HOURS
Status: DISPENSED | OUTPATIENT
Start: 2025-05-03

## 2025-05-02 RX ORDER — POLYETHYLENE GLYCOL 3350 17 G/17G
17 POWDER, FOR SOLUTION ORAL DAILY
Status: DISPENSED | OUTPATIENT
Start: 2025-05-03

## 2025-05-02 RX ORDER — ONDANSETRON 4 MG/1
4 TABLET, FILM COATED ORAL EVERY 8 HOURS PRN
Status: ON HOLD | COMMUNITY

## 2025-05-02 RX ORDER — ACETAMINOPHEN 650 MG/1
650 SUPPOSITORY RECTAL EVERY 4 HOURS PRN
Status: ACTIVE | OUTPATIENT
Start: 2025-05-02

## 2025-05-02 RX ORDER — DICYCLOMINE HYDROCHLORIDE 20 MG/1
20 TABLET ORAL
Status: ON HOLD | COMMUNITY

## 2025-05-02 RX ORDER — GLYCERIN 1 G/1
1 SUPPOSITORY RECTAL DAILY PRN
Status: ACTIVE | OUTPATIENT
Start: 2025-05-02

## 2025-05-02 RX ORDER — ENEMA 19; 7 G/133ML; G/133ML
1 ENEMA RECTAL DAILY PRN
Status: ON HOLD | COMMUNITY

## 2025-05-02 RX ORDER — BUSPIRONE HYDROCHLORIDE 10 MG/1
10 TABLET ORAL 2 TIMES DAILY
Status: DISPENSED | OUTPATIENT
Start: 2025-05-02

## 2025-05-02 RX ORDER — HYDROPHILIC CREAM
1 PASTE (GRAM) TOPICAL 2 TIMES DAILY
Status: ON HOLD | COMMUNITY

## 2025-05-02 RX ORDER — DICYCLOMINE HYDROCHLORIDE 10 MG/1
20 CAPSULE ORAL
Status: ACTIVE | OUTPATIENT
Start: 2025-05-02

## 2025-05-02 RX ORDER — METOPROLOL SUCCINATE 25 MG/1
25 TABLET, EXTENDED RELEASE ORAL DAILY
Status: ON HOLD | COMMUNITY

## 2025-05-02 RX ORDER — LISINOPRIL 20 MG/1
20 TABLET ORAL DAILY
Status: DISPENSED | OUTPATIENT
Start: 2025-05-03

## 2025-05-02 RX ORDER — PREGABALIN 50 MG/1
50 CAPSULE ORAL 2 TIMES DAILY
Status: DISPENSED | OUTPATIENT
Start: 2025-05-02

## 2025-05-02 RX ORDER — HYDROPHILIC CREAM
1 PASTE (GRAM) TOPICAL DAILY PRN
Status: ON HOLD | COMMUNITY

## 2025-05-02 RX ORDER — ONDANSETRON 4 MG/1
4 TABLET, ORALLY DISINTEGRATING ORAL EVERY 8 HOURS PRN
Status: ACTIVE | OUTPATIENT
Start: 2025-05-02

## 2025-05-02 RX ORDER — LEVOTHYROXINE SODIUM 75 UG/1
75 TABLET ORAL DAILY
Status: DISPENSED | OUTPATIENT
Start: 2025-05-03

## 2025-05-02 RX ORDER — ADHESIVE BANDAGE
30 BANDAGE TOPICAL DAILY PRN
Status: ON HOLD | COMMUNITY

## 2025-05-02 RX ORDER — SODIUM CHLORIDE 0.9 % (FLUSH) 0.9 %
10 SYRINGE (ML) INJECTION EVERY 8 HOURS SCHEDULED
Status: ACTIVE | OUTPATIENT
Start: 2025-05-02

## 2025-05-02 RX ORDER — BUSPIRONE HYDROCHLORIDE 10 MG/1
10 TABLET ORAL 2 TIMES DAILY
Status: ON HOLD | COMMUNITY

## 2025-05-02 RX ORDER — KETOROLAC TROMETHAMINE 15 MG/ML
15 INJECTION, SOLUTION INTRAMUSCULAR; INTRAVENOUS ONCE
Status: COMPLETED | OUTPATIENT
Start: 2025-05-02 | End: 2025-05-02

## 2025-05-02 RX ORDER — FAMOTIDINE 20 MG/1
20 TABLET, FILM COATED ORAL 2 TIMES DAILY
Status: DISCONTINUED | OUTPATIENT
Start: 2025-05-02 | End: 2025-05-04

## 2025-05-02 RX ORDER — ACETAMINOPHEN 325 MG/1
650 TABLET ORAL EVERY 4 HOURS PRN
Status: ON HOLD | COMMUNITY

## 2025-05-02 RX ORDER — VENLAFAXINE 25 MG/1
50 TABLET ORAL NIGHTLY
Status: DISPENSED | OUTPATIENT
Start: 2025-05-02

## 2025-05-02 RX ORDER — AMITRIPTYLINE HYDROCHLORIDE 10 MG/1
10 TABLET, FILM COATED ORAL NIGHTLY
Status: DISPENSED | OUTPATIENT
Start: 2025-05-02

## 2025-05-02 RX ORDER — GLIMEPIRIDE 1 MG/1
4 TABLET ORAL
Status: DISPENSED | OUTPATIENT
Start: 2025-05-03

## 2025-05-02 RX ORDER — AMMONIUM LACTATE 12 G/100G
LOTION TOPICAL 2 TIMES DAILY
Status: DISPENSED | OUTPATIENT
Start: 2025-05-02

## 2025-05-02 RX ORDER — BISACODYL 10 MG/1
10 SUPPOSITORY RECTAL DAILY PRN
Status: ON HOLD | COMMUNITY

## 2025-05-02 RX ORDER — BISACODYL 10 MG/1
10 SUPPOSITORY RECTAL ONCE
Status: COMPLETED | OUTPATIENT
Start: 2025-05-02 | End: 2025-05-02

## 2025-05-02 RX ORDER — INSULIN LISPRO 100 [IU]/ML
0-5 INJECTION, SOLUTION INTRAVENOUS; SUBCUTANEOUS
Status: ON HOLD | COMMUNITY

## 2025-05-02 RX ORDER — VANCOMYCIN HYDROCHLORIDE 1 G/20ML
INJECTION, POWDER, LYOPHILIZED, FOR SOLUTION INTRAVENOUS DAILY PRN
Status: DISCONTINUED | OUTPATIENT
Start: 2025-05-02 | End: 2025-05-02

## 2025-05-02 RX ORDER — NYSTATIN 100000 [USP'U]/G
1 POWDER TOPICAL 2 TIMES DAILY
Status: ON HOLD | COMMUNITY

## 2025-05-02 RX ORDER — INSULIN GLARGINE 100 [IU]/ML
10 INJECTION, SOLUTION SUBCUTANEOUS DAILY
Status: ON HOLD | COMMUNITY

## 2025-05-02 RX ORDER — SEMAGLUTIDE 1.34 MG/ML
1 INJECTION, SOLUTION SUBCUTANEOUS WEEKLY
Status: ON HOLD | COMMUNITY

## 2025-05-02 RX ORDER — POLYETHYLENE GLYCOL 3350 17 G/17G
17 POWDER, FOR SOLUTION ORAL AS NEEDED
Status: ON HOLD | COMMUNITY

## 2025-05-02 RX ORDER — ACETAMINOPHEN 650 MG/1
650 SUPPOSITORY RECTAL EVERY 4 HOURS PRN
Status: ON HOLD | COMMUNITY

## 2025-05-02 RX ORDER — LOPERAMIDE HYDROCHLORIDE 2 MG/1
2 CAPSULE ORAL 4 TIMES DAILY PRN
Status: ON HOLD | COMMUNITY

## 2025-05-02 RX ORDER — POLYETHYLENE GLYCOL 3350 17 G/17G
17 POWDER, FOR SOLUTION ORAL DAILY
Status: ON HOLD | COMMUNITY

## 2025-05-02 RX ORDER — DEXTROSE 50 % IN WATER (D50W) INTRAVENOUS SYRINGE
12.5
Status: ACTIVE | OUTPATIENT
Start: 2025-05-02

## 2025-05-02 RX ORDER — CEFTRIAXONE 1 G/50ML
1 INJECTION, SOLUTION INTRAVENOUS EVERY 24 HOURS
Status: DISCONTINUED | OUTPATIENT
Start: 2025-05-03 | End: 2025-05-02

## 2025-05-02 RX ORDER — ACETAMINOPHEN 325 MG/1
650 TABLET ORAL EVERY 4 HOURS PRN
Status: DISPENSED | OUTPATIENT
Start: 2025-05-02

## 2025-05-02 RX ORDER — DEXTROSE 50 % IN WATER (D50W) INTRAVENOUS SYRINGE
25
Status: ACTIVE | OUTPATIENT
Start: 2025-05-02

## 2025-05-02 RX ORDER — FAMOTIDINE 20 MG/1
20 TABLET, FILM COATED ORAL DAILY PRN
Status: ON HOLD | COMMUNITY

## 2025-05-02 RX ORDER — LEVOTHYROXINE SODIUM 75 UG/1
75 TABLET ORAL DAILY
Status: ON HOLD | COMMUNITY

## 2025-05-02 RX ORDER — BISACODYL 5 MG
5 TABLET, DELAYED RELEASE (ENTERIC COATED) ORAL NIGHTLY
Status: DISPENSED | OUTPATIENT
Start: 2025-05-02

## 2025-05-02 RX ORDER — MAGNESIUM SULFATE HEPTAHYDRATE 40 MG/ML
2 INJECTION, SOLUTION INTRAVENOUS ONCE
Status: COMPLETED | OUTPATIENT
Start: 2025-05-02 | End: 2025-05-03

## 2025-05-02 RX ORDER — GLIMEPIRIDE 4 MG/1
4 TABLET ORAL 2 TIMES DAILY
Status: ON HOLD | COMMUNITY

## 2025-05-02 RX ADMIN — Medication 1 TABLET: at 22:45

## 2025-05-02 RX ADMIN — Medication 1500 MG: at 20:20

## 2025-05-02 RX ADMIN — MAGNESIUM SULFATE HEPTAHYDRATE 2 G: 40 INJECTION, SOLUTION INTRAVENOUS at 23:04

## 2025-05-02 RX ADMIN — SODIUM CHLORIDE 1000 ML: 0.9 INJECTION, SOLUTION INTRAVENOUS at 18:53

## 2025-05-02 RX ADMIN — PREGABALIN 50 MG: 50 CAPSULE ORAL at 22:45

## 2025-05-02 RX ADMIN — KETOROLAC TROMETHAMINE 15 MG: 15 INJECTION, SOLUTION INTRAMUSCULAR; INTRAVENOUS at 18:56

## 2025-05-02 RX ADMIN — SODIUM CHLORIDE, SODIUM LACTATE, POTASSIUM CHLORIDE, AND CALCIUM CHLORIDE 1000 ML: .6; .31; .03; .02 INJECTION, SOLUTION INTRAVENOUS at 20:50

## 2025-05-02 RX ADMIN — PIPERACILLIN SODIUM AND TAZOBACTAM SODIUM 4.5 G: 4; .5 INJECTION, SOLUTION INTRAVENOUS at 18:53

## 2025-05-02 RX ADMIN — INSULIN LISPRO 2 UNITS: 100 INJECTION, SOLUTION INTRAVENOUS; SUBCUTANEOUS at 23:03

## 2025-05-02 RX ADMIN — ONDANSETRON 4 MG: 2 INJECTION INTRAMUSCULAR; INTRAVENOUS at 20:20

## 2025-05-02 RX ADMIN — ALBUMIN HUMAN 25 G: 0.25 SOLUTION INTRAVENOUS at 23:03

## 2025-05-02 RX ADMIN — SODIUM CHLORIDE, SODIUM LACTATE, POTASSIUM CHLORIDE, AND CALCIUM CHLORIDE 1000 ML: .6; .31; .03; .02 INJECTION, SOLUTION INTRAVENOUS at 20:20

## 2025-05-02 RX ADMIN — ATORVASTATIN CALCIUM 10 MG: 10 TABLET, FILM COATED ORAL at 22:45

## 2025-05-02 RX ADMIN — Medication 10 ML: at 23:35

## 2025-05-02 RX ADMIN — BISACODYL 10 MG: 10 SUPPOSITORY RECTAL at 21:27

## 2025-05-02 RX ADMIN — SODIUM CHLORIDE, SODIUM LACTATE, POTASSIUM CHLORIDE, AND CALCIUM CHLORIDE 100 ML/HR: .6; .31; .03; .02 INJECTION, SOLUTION INTRAVENOUS at 23:04

## 2025-05-02 RX ADMIN — IOHEXOL 70 ML: 350 INJECTION, SOLUTION INTRAVENOUS at 17:46

## 2025-05-02 RX ADMIN — BUSPIRONE HYDROCHLORIDE 10 MG: 10 TABLET ORAL at 22:45

## 2025-05-02 RX ADMIN — FAMOTIDINE 20 MG: 20 TABLET, FILM COATED ORAL at 22:45

## 2025-05-02 SDOH — SOCIAL STABILITY: SOCIAL INSECURITY: ABUSE: ADULT

## 2025-05-02 SDOH — SOCIAL STABILITY: SOCIAL INSECURITY
WITHIN THE LAST YEAR, HAVE YOU BEEN RAPED OR FORCED TO HAVE ANY KIND OF SEXUAL ACTIVITY BY YOUR PARTNER OR EX-PARTNER?: NO

## 2025-05-02 SDOH — SOCIAL STABILITY: SOCIAL INSECURITY: HAVE YOU HAD ANY THOUGHTS OF HARMING ANYONE ELSE?: NO

## 2025-05-02 SDOH — SOCIAL STABILITY: SOCIAL INSECURITY: DO YOU FEEL UNSAFE GOING BACK TO THE PLACE WHERE YOU ARE LIVING?: NO

## 2025-05-02 SDOH — SOCIAL STABILITY: SOCIAL INSECURITY: WITHIN THE LAST YEAR, HAVE YOU BEEN HUMILIATED OR EMOTIONALLY ABUSED IN OTHER WAYS BY YOUR PARTNER OR EX-PARTNER?: NO

## 2025-05-02 SDOH — SOCIAL STABILITY: SOCIAL INSECURITY
WITHIN THE LAST YEAR, HAVE YOU BEEN KICKED, HIT, SLAPPED, OR OTHERWISE PHYSICALLY HURT BY YOUR PARTNER OR EX-PARTNER?: NO

## 2025-05-02 SDOH — ECONOMIC STABILITY: INCOME INSECURITY: IN THE PAST 12 MONTHS HAS THE ELECTRIC, GAS, OIL, OR WATER COMPANY THREATENED TO SHUT OFF SERVICES IN YOUR HOME?: NO

## 2025-05-02 SDOH — ECONOMIC STABILITY: HOUSING INSECURITY: AT ANY TIME IN THE PAST 12 MONTHS, WERE YOU HOMELESS OR LIVING IN A SHELTER (INCLUDING NOW)?: NO

## 2025-05-02 SDOH — ECONOMIC STABILITY: FOOD INSECURITY: WITHIN THE PAST 12 MONTHS, YOU WORRIED THAT YOUR FOOD WOULD RUN OUT BEFORE YOU GOT THE MONEY TO BUY MORE.: NEVER TRUE

## 2025-05-02 SDOH — SOCIAL STABILITY: SOCIAL INSECURITY: DOES ANYONE TRY TO KEEP YOU FROM HAVING/CONTACTING OTHER FRIENDS OR DOING THINGS OUTSIDE YOUR HOME?: NO

## 2025-05-02 SDOH — SOCIAL STABILITY: SOCIAL INSECURITY: WITHIN THE LAST YEAR, HAVE YOU BEEN AFRAID OF YOUR PARTNER OR EX-PARTNER?: NO

## 2025-05-02 SDOH — ECONOMIC STABILITY: FOOD INSECURITY: WITHIN THE PAST 12 MONTHS, THE FOOD YOU BOUGHT JUST DIDN'T LAST AND YOU DIDN'T HAVE MONEY TO GET MORE.: NEVER TRUE

## 2025-05-02 SDOH — ECONOMIC STABILITY: HOUSING INSECURITY: IN THE PAST 12 MONTHS, HOW MANY TIMES HAVE YOU MOVED WHERE YOU WERE LIVING?: 1

## 2025-05-02 SDOH — SOCIAL STABILITY: SOCIAL INSECURITY: ARE THERE ANY APPARENT SIGNS OF INJURIES/BEHAVIORS THAT COULD BE RELATED TO ABUSE/NEGLECT?: NO

## 2025-05-02 SDOH — ECONOMIC STABILITY: HOUSING INSECURITY: IN THE LAST 12 MONTHS, WAS THERE A TIME WHEN YOU WERE NOT ABLE TO PAY THE MORTGAGE OR RENT ON TIME?: NO

## 2025-05-02 SDOH — HEALTH STABILITY: PHYSICAL HEALTH: ON AVERAGE, HOW MANY MINUTES DO YOU ENGAGE IN EXERCISE AT THIS LEVEL?: 0 MIN

## 2025-05-02 SDOH — ECONOMIC STABILITY: TRANSPORTATION INSECURITY: IN THE PAST 12 MONTHS, HAS LACK OF TRANSPORTATION KEPT YOU FROM MEDICAL APPOINTMENTS OR FROM GETTING MEDICATIONS?: NO

## 2025-05-02 SDOH — SOCIAL STABILITY: SOCIAL INSECURITY: WERE YOU ABLE TO COMPLETE ALL THE BEHAVIORAL HEALTH SCREENINGS?: YES

## 2025-05-02 SDOH — HEALTH STABILITY: PHYSICAL HEALTH: ON AVERAGE, HOW MANY DAYS PER WEEK DO YOU ENGAGE IN MODERATE TO STRENUOUS EXERCISE (LIKE A BRISK WALK)?: 0 DAYS

## 2025-05-02 SDOH — ECONOMIC STABILITY: FOOD INSECURITY: HOW HARD IS IT FOR YOU TO PAY FOR THE VERY BASICS LIKE FOOD, HOUSING, MEDICAL CARE, AND HEATING?: NOT HARD AT ALL

## 2025-05-02 SDOH — SOCIAL STABILITY: SOCIAL INSECURITY: HAVE YOU HAD THOUGHTS OF HARMING ANYONE ELSE?: NO

## 2025-05-02 SDOH — SOCIAL STABILITY: SOCIAL INSECURITY: DO YOU FEEL ANYONE HAS EXPLOITED OR TAKEN ADVANTAGE OF YOU FINANCIALLY OR OF YOUR PERSONAL PROPERTY?: NO

## 2025-05-02 SDOH — SOCIAL STABILITY: SOCIAL INSECURITY: ARE YOU OR HAVE YOU BEEN THREATENED OR ABUSED PHYSICALLY, EMOTIONALLY, OR SEXUALLY BY ANYONE?: NO

## 2025-05-02 SDOH — SOCIAL STABILITY: SOCIAL INSECURITY: HAS ANYONE EVER THREATENED TO HURT YOUR FAMILY OR YOUR PETS?: NO

## 2025-05-02 ASSESSMENT — COLUMBIA-SUICIDE SEVERITY RATING SCALE - C-SSRS
1. IN THE PAST MONTH, HAVE YOU WISHED YOU WERE DEAD OR WISHED YOU COULD GO TO SLEEP AND NOT WAKE UP?: NO
6. HAVE YOU EVER DONE ANYTHING, STARTED TO DO ANYTHING, OR PREPARED TO DO ANYTHING TO END YOUR LIFE?: NO
2. HAVE YOU ACTUALLY HAD ANY THOUGHTS OF KILLING YOURSELF?: NO

## 2025-05-02 ASSESSMENT — ACTIVITIES OF DAILY LIVING (ADL)
LACK_OF_TRANSPORTATION: NO
JUDGMENT_ADEQUATE_SAFELY_COMPLETE_DAILY_ACTIVITIES: YES
ASSISTIVE_DEVICE: WHEELCHAIR
FEEDING YOURSELF: NEEDS ASSISTANCE
GROOMING: NEEDS ASSISTANCE
LACK_OF_TRANSPORTATION: NO
WALKS IN HOME: NEEDS ASSISTANCE
TOILETING: NEEDS ASSISTANCE
BATHING: NEEDS ASSISTANCE
HEARING - LEFT EAR: FUNCTIONAL
HEARING - RIGHT EAR: FUNCTIONAL
PATIENT'S MEMORY ADEQUATE TO SAFELY COMPLETE DAILY ACTIVITIES?: YES
ADEQUATE_TO_COMPLETE_ADL: YES
DRESSING YOURSELF: NEEDS ASSISTANCE
LACK_OF_TRANSPORTATION: NO

## 2025-05-02 ASSESSMENT — COGNITIVE AND FUNCTIONAL STATUS - GENERAL
HELP NEEDED FOR BATHING: TOTAL
PATIENT BASELINE BEDBOUND: YES
PERSONAL GROOMING: A LOT
TOILETING: TOTAL
DRESSING REGULAR UPPER BODY CLOTHING: TOTAL
STANDING UP FROM CHAIR USING ARMS: TOTAL
CLIMB 3 TO 5 STEPS WITH RAILING: TOTAL
MOVING FROM LYING ON BACK TO SITTING ON SIDE OF FLAT BED WITH BEDRAILS: TOTAL
DRESSING REGULAR LOWER BODY CLOTHING: TOTAL
EATING MEALS: A LOT
MOVING TO AND FROM BED TO CHAIR: TOTAL
TURNING FROM BACK TO SIDE WHILE IN FLAT BAD: TOTAL
WALKING IN HOSPITAL ROOM: TOTAL
MOBILITY SCORE: 6
DAILY ACTIVITIY SCORE: 8

## 2025-05-02 ASSESSMENT — PAIN SCALES - WONG BAKER: WONGBAKER_NUMERICALRESPONSE: HURTS EVEN MORE

## 2025-05-02 ASSESSMENT — LIFESTYLE VARIABLES
TOTAL SCORE: 0
HAVE YOU EVER FELT YOU SHOULD CUT DOWN ON YOUR DRINKING: NO
EVER HAD A DRINK FIRST THING IN THE MORNING TO STEADY YOUR NERVES TO GET RID OF A HANGOVER: NO
SKIP TO QUESTIONS 9-10: 1
AUDIT-C TOTAL SCORE: 0
EVER FELT BAD OR GUILTY ABOUT YOUR DRINKING: NO
HOW OFTEN DO YOU HAVE A DRINK CONTAINING ALCOHOL: NEVER
HOW MANY STANDARD DRINKS CONTAINING ALCOHOL DO YOU HAVE ON A TYPICAL DAY: PATIENT DOES NOT DRINK
HOW OFTEN DO YOU HAVE 6 OR MORE DRINKS ON ONE OCCASION: NEVER
HAVE PEOPLE ANNOYED YOU BY CRITICIZING YOUR DRINKING: NO
AUDIT-C TOTAL SCORE: 0

## 2025-05-02 ASSESSMENT — PATIENT HEALTH QUESTIONNAIRE - PHQ9
1. LITTLE INTEREST OR PLEASURE IN DOING THINGS: NOT AT ALL
2. FEELING DOWN, DEPRESSED OR HOPELESS: NOT AT ALL
SUM OF ALL RESPONSES TO PHQ9 QUESTIONS 1 & 2: 0

## 2025-05-02 ASSESSMENT — PAIN DESCRIPTION - ORIENTATION: ORIENTATION: RIGHT;LEFT

## 2025-05-02 ASSESSMENT — PAIN - FUNCTIONAL ASSESSMENT
PAIN_FUNCTIONAL_ASSESSMENT: 0-10
PAIN_FUNCTIONAL_ASSESSMENT: WONG-BAKER FACES
PAIN_FUNCTIONAL_ASSESSMENT: 0-10

## 2025-05-02 ASSESSMENT — PAIN SCALES - GENERAL
PAINLEVEL_OUTOF10: 0 - NO PAIN
PAINLEVEL_OUTOF10: 0 - NO PAIN

## 2025-05-02 ASSESSMENT — PAIN DESCRIPTION - LOCATION: LOCATION: EYE

## 2025-05-02 NOTE — PROGRESS NOTES
Vancomycin Dosing by Pharmacy - Emergency Department    Lisa Vu is a 64 y.o. female who pharmacy has been consulted for vancomycin one-time dosing for other unknown AMS by an Emergency Department (ED) provider.    CrCl cannot be calculated (No successful lab value found.).    HD/PD/NAYELY: No    Blood pressure 127/68, pulse (!) 106, temperature 37.4 °C (99.3 °F), temperature source Temporal, resp. rate (!) 27, SpO2 94%.    Concern for Severe Sepsis and/or Septic Shock: Yes    Assessment/Plan:  Patient will not be given a loading dose.   Will initiate vancomycin ED dose, 1500 mg (16.5 mg/kg) x 1 dose.  Pharmacy will now discontinue the one time dose consult. Maintenance dose and continuation of vancomycin to be determined by the admitting team. If vancomycin appropriate for continuation, another pharmacy to dose vancomycin consult will be placed at that time.      Thank you for allowing me to take part in the care of this patient. Please contact pharmacy with any questions.    Rebecca Lane, PharmD   5/2/2025 6:40 PM

## 2025-05-02 NOTE — PROGRESS NOTES
PHARMACY STROKE RESPONSE      Patient Name: Lisa Vu  MRN: [id number]  Location: [place] [address]    Patient Weight (kg):   Wt Readings from Last 1 Encounters:   No data found for Wt        An acute Brain Attack has been activated, pharmacy participated in multidisciplinary team bedside response for Lisa Vu.  Contraindications for fibrinolytic therapy have been reviewed by pharmacy and any issues relating to medication therapy have been discussed directly with the provider(s) caring for this patient.     Pharmacy aided in the bedside response for fibrinolytic therapy. Patient did not receive fibrinolysis.         Thank you for allowing me to take part in the care of this patient.     Caitlin Roblero  5/2/2025  5:44 PM         References:    [facility] [place] Stroke Tools   [facility] [place] IV Thrombolysis Checklist

## 2025-05-02 NOTE — CONSULTS
"Inpatient consult to Neuro TeleStroke  Consult performed by: Chandler Castillo MD  Consult ordered by: Isai Llamas DO        Virtual Visit start time: 554pm    History Of Present Illness:  Historian: Patient and ED Provider   Lisa Vu is a 64 y.o. female presenting with confusion and not being able to speak. Unclear baseline.    In the ED, pt is sleepy but wakes up to minimal stimulation, requires repeated stimulation to stay awake, slow to respond, followed simple commands, able to say her last name.   Last known well: 1pm    Had stroke symptoms resolved at time of presentation: No   Current antiplatelet/anticoagulant use: None    Prior Functional Status (Modified Darryl Scale):  Unclear    Stroke Risk Factors:  Hypertension, Diabetes Mellitus, and Hyperlipidemia    Last Recorded Vitals:  Blood pressure 161/78.     NIHSS:   NIH Stroke Scale:     1A. Level of Consciousness:  Arouses to minor stimulation (+1)    1B. Ask Month and Age:  0 questions right (+2)    1C. Blink Eyes & Squeeze Hands:  Performs both tasks (0)    2. Best Gaze:  Normal (0)    3. Visual:  No visual loss (0)    4. Facial Palsy:  Normal symmetry (0)    5A. Motor - Left Arm:  No drift (0)    5B. Motor - Right Arm:  No drift (0)    6A. Motor - Left Leg:  No effort against gravity (+3)    6B. Motor - Right Leg:  No effort against gravity (+3)    7. Limb Ataxia:  No ataxia (0)    8. Sensory Loss:  Normal (no sensory loss) (0)    9. Best Language:  Mild-moderate aphasia (+1)    10. Dysarthia:  Mild-moderate dysarthria (+1)    11. Extinction and Inattention:  No abnormality (0)    NIH Stroke Scale:  11           Relevant Results:  LABS:  No results found for: \"GLUCOSE\", \"INR\"   Results for orders placed or performed during the hospital encounter of 05/02/25 (from the past 24 hours)   POCT GLUCOSE   Result Value Ref Range    POCT Glucose 222 (H) 74 - 99 mg/dL        CT Head Imaging:  [facility] imaging personally reviewed, showed no acute ischemic " / hemorrhagic changes     CTA Head and Neck Imaging:  CTA head and neck imaging personally reviewed, no large vessel occlusion or severe stenosis seen      Diagnosis:  Stroke not suspected, alternative etiology likely     Assessment/Plan:  64 y o with h/o HTN, HLD, DM2 presenting with acute confusion. Exam with decreased alertness, slow processing, suspicious for encephalopathy.     IV Thrombolysis IV Thrombolysis Checklist        IV Thrombolysis Given: No; Thrombolysis contraindication reason: Time from Last Known Well (or stroke onset) is >4.5 hours           Patient is a candidate for thrombectomy:  yes/no: No; contraindication reason: No evidence of proximal occlusion    Additional Recommendations:  Infectious/ metabolic work up for encephalopathy. Low suspicion for acute ischemic stroke.   If no obvious etiology found for encephalopathy and mentation does not improve, can consider MRI brain w/o contrast    Disposition:  Patient will remain at referring facility for further evaluation and management.    Virtual or Telephone Consent    An interactive audio and video telecommunication system which permits real time communications between the patient (at the originating site) and provider (at the distant site) was utilized to provide this telehealth service.   Verbal consent was requested and obtained from Lisa Vu on this date, 05/02/25 for a telehealth visit.      Telestroke is covered in shift work. If there are further Neurological questions or concerns please contact your regional neurologist on call during daytime hours or contact the transfer center with an ADT20 order.    Chandler Castillo MD

## 2025-05-02 NOTE — ED PROVIDER NOTES
History/Exam limitations: confusion and acuity of illness.   Additional history was obtained from patient and EMS personnel.    HPI:       Lisa Vu is a 64 y.o. with a history of type 2 diabetes, CKD, GERD, hypothyroidism, hyperlipidemia presenting today with concern for altered mental status, not following commands.  On arrival, patient was following commands, was able to speak, was unable to move bilateral lower extremities.  Was otherwise difficult to obtain history from given her mental status.       Last Known Well Time: 1300       Medical History[1]     ------------------------------------------------------------------------------------------------------------------------------------------     Physical Exam:    ED Triage Vitals [05/02/25 1743]   Temp Pulse Resp BP   -- -- -- 161/78      SpO2 Temp src Heart Rate Source Patient Position   -- -- -- --      BP Location FiO2 (%)     -- --          Gen: Not in acute distress  Head/Neck: NCAT  Eyes: Anicteric sclerae, noninjected conjunctivae  Nose: No rhinorrhea  Mouth:  MMM  Heart: Regular rate and rhythm w/ no murmurs, rubs, gallops  Lungs: CTAB w/o wheezes, rales, rhonchi, no increased work of breathing  Abdomen: Soft, NT/ND  Musculoskeletal: No deformities  Extremities: No edema.  Neurologic: Please see below for NIHSS  Skin: No rashes noted  Psychological: Calm        Interval: Baseline  Time: 1:47 AM  Person Administering Scale: Isai Llamas DO     1a  Level of consciousness: 0=alert; keenly responsive   1b. LOC questions:  1=Performs one task correctly   1c. LOC commands: 2=Performs neither task correctly   2.  Best Gaze: 0=normal   3. Visual: 0=No visual loss   4. Facial Palsy: 0=Normal symmetric movement   5a. Motor left arm: 0=No drift, limb holds 90 (or 45) degrees for full 10 seconds   5b.  Motor right arm: 0=No drift, limb holds 90 (or 45) degrees for full 10 seconds   6a. motor left leg: 3=No effort against gravity, limb falls   6b  Motor  right leg:  3=No effort against gravity, limb falls   7. Limb Ataxia: 2=Present in two limbs   8.  Sensory: 0=Normal; no sensory loss   9. Best Language:  0=No aphasia, normal   10. Dysarthria: 0=Normal   11. Extinction and Inattention: 0=No abnormality   12. Distal motor function: 0=Normal     Total:   11           VAN: VAN: Positive     ------------------------------------------------------------------------------------------------------------------------------------------     Medical Decision Making:     ED Course as of 05/03/25 0147   Fri May 02, 2025   1829 Patient was immediately taken to CT scan for CT head without contrast.  Labs were drawn including CBC, CMP, coags, type and screen, magnesium serial troponin, EKG, chest x-ray. [CL]   1829 Per EMS, patient's last known well was 1 PM today.  After further evaluation of the patient, there are no lateralizing features, was sickly has other explanation for her presentation today.  Vital signs notable for heart rate of 106, breathing fast at 29 breath/min, saturate normally on room air.  We will proceed with usual septic work-up including CBC, CMP, blood cultures x2, lactate, urinalysis with reflex to culture if indicated, EKG. concern for cephalopathy.  Patient was given vancomycin and Zosyn for antibiotic coverage. [CL]   1838 CTA head neck:   IMPRESSION:  1. CTA of Neck arteries demonstrates no significant flow-limiting  stenosis or dissection.  2. CTA of Intracranial arteries demonstrates no evidence for large  vessel occlusion or other acute findings.  3. Multifocal mild atherosclerosis, without evidence for  hemodynamically significant stenosis.   [CL]   1858 Lactate(!): 2.5  Ideal body weight 61.6 kg, 30 mL/kg is 1800 cc, will give 2 L LR to cover sepsis bundle with antibiotics, blood cultures ordered.  Of note, with lactic less than 4, does not meet criteria for septic shock []   1859 Patient having dry heaving, will give Zofran []      ED Course User  Index  [CL] Isai Llamas DO  [JH] Cristian Kyle MD         Diagnoses as of 05/03/25 0147   Altered mental status, unspecified altered mental status type   Urinary tract infection in female   Severe sepsis with acute organ dysfunction (Multi)        Independent Interpretation of Studies: I independently interpreted the CT head and see No obvious evidence of intracranial hemorrhage and No obvious evidence of skull fracture    Chronic Medical Conditions Significantly Affecting Care: Diabetes, hypertension, hypothyroidism    Social Determinants of Health Significantly Affecting Care: Chronic illness     External Records Reviewed: I reviewed recent and relevant outside records including: Nursing home records     Discussion of Management with Other Providers:   I discussed the patient/results with: Telestroke, neurology and the admitting team      IV Thrombolysis:    No Thrombolysis contraindication reason: Working diagnosis is NOT a suspected ischemic stroke    Procedure  Procedures            [1]   Past Medical History:  Diagnosis Date    Ambulatory dysfunction     Anxiety and depression     Asthma     Carpal tunnel syndrome     Cerebral atherosclerosis     Chronic back pain     Chronic kidney disease, stage II (mild)     Chronic nausea     COVID-19 vaccine administered     Dementia     Frequent falls     Frequent patient in emergency department     Gastroesophageal reflux disease     History of rib fracture     Hyperlipidemia     Hypertension     Hypothyroidism     IgA monoclonal gammopathy of uncertain significance     Insulin dependent type 2 diabetes mellitus (Multi)     Irritable bowel syndrome with predominant constipation     Lumbar spondylosis     Obstructive sleep apnea     Osteoarthritis     Partial thickness rotator cuff tear     Peptic ulcer disease     Peptic ulcer disease     Recurrent urinary tract infection     Restless leg syndrome     Type 2 diabetes mellitus with peripheral neuropathy     Vitamin D  deficiency         Isai Llamas DO  Resident  05/03/25 0147

## 2025-05-03 ENCOUNTER — APPOINTMENT (OUTPATIENT)
Dept: RADIOLOGY | Facility: HOSPITAL | Age: 65
DRG: 871 | End: 2025-05-03
Payer: COMMERCIAL

## 2025-05-03 LAB
ANION GAP SERPL CALC-SCNC: 15 MMOL/L (ref 10–20)
APTT PPP: 29 SECONDS (ref 26–36)
ATRIAL RATE: 109 BPM
BUN SERPL-MCNC: 16 MG/DL (ref 6–23)
CALCIUM SERPL-MCNC: 8.6 MG/DL (ref 8.6–10.3)
CHLORIDE SERPL-SCNC: 102 MMOL/L (ref 98–107)
CO2 SERPL-SCNC: 23 MMOL/L (ref 21–32)
CREAT SERPL-MCNC: 0.98 MG/DL (ref 0.5–1.05)
EGFRCR SERPLBLD CKD-EPI 2021: 64 ML/MIN/1.73M*2
ERYTHROCYTE [DISTWIDTH] IN BLOOD BY AUTOMATED COUNT: 15.7 % (ref 11.5–14.5)
GLUCOSE BLD MANUAL STRIP-MCNC: 135 MG/DL (ref 74–99)
GLUCOSE BLD MANUAL STRIP-MCNC: 156 MG/DL (ref 74–99)
GLUCOSE BLD MANUAL STRIP-MCNC: 175 MG/DL (ref 74–99)
GLUCOSE BLD MANUAL STRIP-MCNC: 225 MG/DL (ref 74–99)
GLUCOSE SERPL-MCNC: 192 MG/DL (ref 74–99)
HCT VFR BLD AUTO: 39.3 % (ref 36–46)
HGB BLD-MCNC: 11.9 G/DL (ref 12–16)
HOLD SPECIMEN: NORMAL
INR PPP: 1.1 (ref 0.9–1.1)
LACTATE SERPL-SCNC: 1.9 MMOL/L (ref 0.4–2)
MAGNESIUM SERPL-MCNC: 2.17 MG/DL (ref 1.6–2.4)
MCH RBC QN AUTO: 27.5 PG (ref 26–34)
MCHC RBC AUTO-ENTMCNC: 30.3 G/DL (ref 32–36)
MCV RBC AUTO: 91 FL (ref 80–100)
NRBC BLD-RTO: 0 /100 WBCS (ref 0–0)
P AXIS: 74 DEGREES
P OFFSET: 185 MS
P ONSET: 128 MS
PLATELET # BLD AUTO: 244 X10*3/UL (ref 150–450)
POTASSIUM SERPL-SCNC: 4.2 MMOL/L (ref 3.5–5.3)
PR INTERVAL: 164 MS
PREALB SERPL-MCNC: 11.2 MG/DL (ref 18–40)
PROTHROMBIN TIME: 12.5 SECONDS (ref 9.8–12.4)
Q ONSET: 210 MS
QRS COUNT: 18 BEATS
QRS DURATION: 94 MS
QT INTERVAL: 326 MS
QTC CALCULATION(BAZETT): 439 MS
QTC FREDERICIA: 397 MS
R AXIS: -47 DEGREES
RBC # BLD AUTO: 4.33 X10*6/UL (ref 4–5.2)
SODIUM SERPL-SCNC: 136 MMOL/L (ref 136–145)
T AXIS: 80 DEGREES
T OFFSET: 373 MS
VENTRICULAR RATE: 109 BPM
WBC # BLD AUTO: 17.7 X10*3/UL (ref 4.4–11.3)

## 2025-05-03 PROCEDURE — 2500000005 HC RX 250 GENERAL PHARMACY W/O HCPCS: Performed by: NURSE PRACTITIONER

## 2025-05-03 PROCEDURE — 36415 COLL VENOUS BLD VENIPUNCTURE: CPT | Performed by: NURSE PRACTITIONER

## 2025-05-03 PROCEDURE — 82947 ASSAY GLUCOSE BLOOD QUANT: CPT

## 2025-05-03 PROCEDURE — 87449 NOS EACH ORGANISM AG IA: CPT | Mod: STJLAB | Performed by: INTERNAL MEDICINE

## 2025-05-03 PROCEDURE — 2500000001 HC RX 250 WO HCPCS SELF ADMINISTERED DRUGS (ALT 637 FOR MEDICARE OP): Performed by: NURSE PRACTITIONER

## 2025-05-03 PROCEDURE — 71045 X-RAY EXAM CHEST 1 VIEW: CPT | Performed by: RADIOLOGY

## 2025-05-03 PROCEDURE — 2500000004 HC RX 250 GENERAL PHARMACY W/ HCPCS (ALT 636 FOR OP/ED): Mod: JZ | Performed by: NURSE PRACTITIONER

## 2025-05-03 PROCEDURE — 87899 AGENT NOS ASSAY W/OPTIC: CPT | Mod: STJLAB | Performed by: INTERNAL MEDICINE

## 2025-05-03 PROCEDURE — 84134 ASSAY OF PREALBUMIN: CPT | Mod: STJLAB | Performed by: NURSE PRACTITIONER

## 2025-05-03 PROCEDURE — 99221 1ST HOSP IP/OBS SF/LOW 40: CPT | Performed by: NURSE PRACTITIONER

## 2025-05-03 PROCEDURE — 83735 ASSAY OF MAGNESIUM: CPT | Performed by: NURSE PRACTITIONER

## 2025-05-03 PROCEDURE — 80048 BASIC METABOLIC PNL TOTAL CA: CPT | Performed by: NURSE PRACTITIONER

## 2025-05-03 PROCEDURE — 87040 BLOOD CULTURE FOR BACTERIA: CPT | Mod: STJLAB | Performed by: NURSE PRACTITIONER

## 2025-05-03 PROCEDURE — 85027 COMPLETE CBC AUTOMATED: CPT | Performed by: NURSE PRACTITIONER

## 2025-05-03 PROCEDURE — 74018 RADEX ABDOMEN 1 VIEW: CPT

## 2025-05-03 PROCEDURE — 2500000002 HC RX 250 W HCPCS SELF ADMINISTERED DRUGS (ALT 637 FOR MEDICARE OP, ALT 636 FOR OP/ED): Performed by: NURSE PRACTITIONER

## 2025-05-03 PROCEDURE — 71045 X-RAY EXAM CHEST 1 VIEW: CPT

## 2025-05-03 PROCEDURE — 2060000001 HC INTERMEDIATE ICU ROOM DAILY

## 2025-05-03 RX ORDER — BISACODYL 10 MG/1
10 SUPPOSITORY RECTAL DAILY
Status: DISPENSED | OUTPATIENT
Start: 2025-05-03

## 2025-05-03 RX ORDER — VANCOMYCIN HYDROCHLORIDE 1 G/200ML
1000 INJECTION, SOLUTION INTRAVENOUS EVERY 12 HOURS
Status: DISPENSED | OUTPATIENT
Start: 2025-05-03

## 2025-05-03 RX ORDER — VANCOMYCIN HYDROCHLORIDE 1 G/20ML
INJECTION, POWDER, LYOPHILIZED, FOR SOLUTION INTRAVENOUS DAILY PRN
Status: DISPENSED | OUTPATIENT
Start: 2025-05-03

## 2025-05-03 RX ADMIN — Medication 1 SPRAY: at 22:43

## 2025-05-03 RX ADMIN — PREGABALIN 50 MG: 50 CAPSULE ORAL at 09:22

## 2025-05-03 RX ADMIN — NYSTATIN 1 APPLICATION: 100000 POWDER TOPICAL at 09:22

## 2025-05-03 RX ADMIN — PIPERACILLIN SODIUM AND TAZOBACTAM SODIUM 3.38 G: 3; .375 INJECTION, SOLUTION INTRAVENOUS at 05:42

## 2025-05-03 RX ADMIN — Medication 1 SPRAY: at 11:00

## 2025-05-03 RX ADMIN — BUSPIRONE HYDROCHLORIDE 10 MG: 10 TABLET ORAL at 22:25

## 2025-05-03 RX ADMIN — BUSPIRONE HYDROCHLORIDE 10 MG: 10 TABLET ORAL at 09:22

## 2025-05-03 RX ADMIN — NYSTATIN 1 APPLICATION: 100000 POWDER TOPICAL at 22:47

## 2025-05-03 RX ADMIN — Medication 10 ML: at 22:45

## 2025-05-03 RX ADMIN — AMITRIPTYLINE HYDROCHLORIDE 10 MG: 10 TABLET, FILM COATED ORAL at 22:27

## 2025-05-03 RX ADMIN — Medication: at 22:43

## 2025-05-03 RX ADMIN — INSULIN LISPRO 1 UNITS: 100 INJECTION, SOLUTION INTRAVENOUS; SUBCUTANEOUS at 09:23

## 2025-05-03 RX ADMIN — INSULIN LISPRO 1 UNITS: 100 INJECTION, SOLUTION INTRAVENOUS; SUBCUTANEOUS at 13:17

## 2025-05-03 RX ADMIN — Medication 1 TABLET: at 22:25

## 2025-05-03 RX ADMIN — MELATONIN TAB 3 MG 3 MG: 3 TAB at 22:26

## 2025-05-03 RX ADMIN — Medication 10 ML: at 05:42

## 2025-05-03 RX ADMIN — INSULIN LISPRO 2 UNITS: 100 INJECTION, SOLUTION INTRAVENOUS; SUBCUTANEOUS at 22:28

## 2025-05-03 RX ADMIN — GLIMEPIRIDE 4 MG: 1 TABLET ORAL at 18:42

## 2025-05-03 RX ADMIN — FAMOTIDINE 20 MG: 20 TABLET, FILM COATED ORAL at 22:25

## 2025-05-03 RX ADMIN — ATORVASTATIN CALCIUM 10 MG: 10 TABLET, FILM COATED ORAL at 22:25

## 2025-05-03 RX ADMIN — VANCOMYCIN HYDROCHLORIDE 1000 MG: 1 INJECTION, SOLUTION INTRAVENOUS at 18:45

## 2025-05-03 RX ADMIN — METOPROLOL SUCCINATE 25 MG: 25 TABLET, EXTENDED RELEASE ORAL at 09:22

## 2025-05-03 RX ADMIN — ACETAMINOPHEN 650 MG: 325 TABLET ORAL at 18:41

## 2025-05-03 RX ADMIN — LISINOPRIL 20 MG: 20 TABLET ORAL at 09:22

## 2025-05-03 RX ADMIN — PIPERACILLIN SODIUM AND TAZOBACTAM SODIUM 3.38 G: 3; .375 INJECTION, SOLUTION INTRAVENOUS at 22:27

## 2025-05-03 RX ADMIN — ACETAMINOPHEN 650 MG: 325 TABLET ORAL at 09:20

## 2025-05-03 RX ADMIN — ENOXAPARIN SODIUM 40 MG: 40 INJECTION SUBCUTANEOUS at 09:22

## 2025-05-03 RX ADMIN — Medication 10 ML: at 15:25

## 2025-05-03 RX ADMIN — BISACODYL 10 MG: 10 SUPPOSITORY RECTAL at 11:25

## 2025-05-03 RX ADMIN — FAMOTIDINE 20 MG: 20 TABLET, FILM COATED ORAL at 09:22

## 2025-05-03 RX ADMIN — GLIMEPIRIDE 4 MG: 1 TABLET ORAL at 09:22

## 2025-05-03 RX ADMIN — PREGABALIN 50 MG: 50 CAPSULE ORAL at 22:26

## 2025-05-03 RX ADMIN — BISACODYL 5 MG: 5 TABLET, COATED ORAL at 22:26

## 2025-05-03 RX ADMIN — Medication: at 09:22

## 2025-05-03 RX ADMIN — INSULIN GLARGINE 10 UNITS: 100 INJECTION, SOLUTION SUBCUTANEOUS at 09:25

## 2025-05-03 RX ADMIN — Medication 1 TABLET: at 09:22

## 2025-05-03 RX ADMIN — VENLAFAXINE 50 MG: 25 TABLET ORAL at 22:26

## 2025-05-03 RX ADMIN — PIPERACILLIN SODIUM AND TAZOBACTAM SODIUM 3.38 G: 3; .375 INJECTION, SOLUTION INTRAVENOUS at 15:07

## 2025-05-03 RX ADMIN — SODIUM CHLORIDE, SODIUM LACTATE, POTASSIUM CHLORIDE, AND CALCIUM CHLORIDE 1000 ML: .6; .31; .03; .02 INJECTION, SOLUTION INTRAVENOUS at 13:17

## 2025-05-03 RX ADMIN — Medication 1 SPRAY: at 06:09

## 2025-05-03 RX ADMIN — LEVOTHYROXINE SODIUM 75 MCG: 0.07 TABLET ORAL at 05:42

## 2025-05-03 RX ADMIN — POLYETHYLENE GLYCOL 3350 17 G: 17 POWDER, FOR SOLUTION ORAL at 09:21

## 2025-05-03 ASSESSMENT — COGNITIVE AND FUNCTIONAL STATUS - GENERAL
TURNING FROM BACK TO SIDE WHILE IN FLAT BAD: TOTAL
DAILY ACTIVITIY SCORE: 6
DRESSING REGULAR UPPER BODY CLOTHING: TOTAL
CLIMB 3 TO 5 STEPS WITH RAILING: TOTAL
STANDING UP FROM CHAIR USING ARMS: TOTAL
MOBILITY SCORE: 6
MOVING FROM LYING ON BACK TO SITTING ON SIDE OF FLAT BED WITH BEDRAILS: TOTAL
MOVING TO AND FROM BED TO CHAIR: TOTAL
PERSONAL GROOMING: TOTAL
HELP NEEDED FOR BATHING: TOTAL
TOILETING: TOTAL
EATING MEALS: TOTAL
WALKING IN HOSPITAL ROOM: TOTAL
DRESSING REGULAR LOWER BODY CLOTHING: TOTAL

## 2025-05-03 ASSESSMENT — PAIN SCALES - GENERAL
PAINLEVEL_OUTOF10: 10 - WORST POSSIBLE PAIN
PAINLEVEL_OUTOF10: 9
PAINLEVEL_OUTOF10: 2
PAINLEVEL_OUTOF10: 0 - NO PAIN
PAINLEVEL_OUTOF10: 10 - WORST POSSIBLE PAIN
PAINLEVEL_OUTOF10: 5 - MODERATE PAIN

## 2025-05-03 ASSESSMENT — ENCOUNTER SYMPTOMS
ABDOMINAL DISTENTION: 0
DYSURIA: 0
SHORTNESS OF BREATH: 0
CONFUSION: 1
SORE THROAT: 0
PALPITATIONS: 0
NECK PAIN: 0
FLANK PAIN: 0
CHILLS: 0
NAUSEA: 0
CONSTIPATION: 1
WOUND: 0
FEVER: 0
APNEA: 0
APPETITE CHANGE: 0
VOMITING: 0
ABDOMINAL PAIN: 0
DIZZINESS: 0
ACTIVITY CHANGE: 0
HEADACHES: 0
WEAKNESS: 1
AGITATION: 0
FATIGUE: 1
COUGH: 0
CHEST TIGHTNESS: 0
NECK STIFFNESS: 0
TROUBLE SWALLOWING: 0

## 2025-05-03 ASSESSMENT — PAIN DESCRIPTION - DESCRIPTORS: DESCRIPTORS: ACHING

## 2025-05-03 ASSESSMENT — PAIN - FUNCTIONAL ASSESSMENT
PAIN_FUNCTIONAL_ASSESSMENT: 0-10

## 2025-05-03 ASSESSMENT — PAIN DESCRIPTION - LOCATION
LOCATION: COCCYX
LOCATION: HIP

## 2025-05-03 NOTE — CARE PLAN
The patient's goals for the shift include      The clinical goals for the shift include pt will remain safe this shift with stable BP      Problem: Pain - Adult  Goal: Verbalizes/displays adequate comfort level or baseline comfort level  Outcome: Progressing     Problem: Safety - Adult  Goal: Free from fall injury  Outcome: Progressing     Problem: Discharge Planning  Goal: Discharge to home or other facility with appropriate resources  Outcome: Progressing     Problem: Chronic Conditions and Co-morbidities  Goal: Patient's chronic conditions and co-morbidity symptoms are monitored and maintained or improved  Outcome: Progressing     Problem: Nutrition  Goal: Nutrient intake appropriate for maintaining nutritional needs  Outcome: Progressing     Problem: Fall/Injury  Goal: Not fall by end of shift  Outcome: Progressing  Goal: Be free from injury by end of the shift  Outcome: Progressing  Goal: Verbalize understanding of personal risk factors for fall in the hospital  Outcome: Progressing  Goal: Verbalize understanding of risk factor reduction measures to prevent injury from fall in the home  Outcome: Progressing  Goal: Pace activities to prevent fatigue by end of the shift  Outcome: Progressing     Problem: Skin  Goal: Decreased wound size/increased tissue granulation at next dressing change  Outcome: Progressing  Goal: Participates in plan/prevention/treatment measures  Outcome: Progressing  Goal: Prevent/manage excess moisture  Outcome: Progressing  Goal: Prevent/minimize sheer/friction injuries  Outcome: Progressing  Goal: Promote/optimize nutrition  Outcome: Progressing  Goal: Promote skin healing  Outcome: Progressing     Problem: Infection prevention/bleeding  Goal: Infection s/sx managed  Outcome: Progressing  Goal: No further progression of infection  Outcome: Progressing     Problem: Diabetes  Goal: Maintain glucose levels >70mg/dl to <250mg/dl throughout shift  Outcome: Progressing

## 2025-05-03 NOTE — CONSULTS
PATIENT NAME: Lisa Vu       MRN: [id number]     SERVICE DATE:  5/3/2025          SERVICE TIME:  3:52 PM          SIGNATURE: Pawan Amezcua MD, MS          PRIMARY CARE PHYSICIAN: Jalen Lyle DO     REASON FOR CONSULT: change of mental status     REQUESTING PHYSICIAN: Dr. Orozco          ASSESSMENT AND PLAN      Change of mental status, weakness, hypotension, leukocytosis, hyperglycemia in 67 y/o female with CKD 2, dementia T2DM, recurrent UTI    CT brain no acute finding  Has pyuria  Her mental status has improved when I saw her and she is able to tell me that she has chest pain and lower abdominal pain but denied urinary symptoms  Troponin normal  Lactate elevated  No hypoxia    Check CXR  Await urine and blood culture  Continue Vancomycin and Zosyn    Zosyn is an antibiotic that can cause side effects such as rash, diarrhea, bone marrow suppression. Will monitor those potential side effects.Vancomycin is an antibiotic that can cause side effects such as rash, diarrhea, bone marrow suppression or nephrotoxicity.  We will monitor for these and other side effects.  We will also monitor vancomycin levels.         HPI     Ms. Vu is a 64 y.o. female with history of dementia, stage II chronic kidney disease, ambulatory dysfunction with frequent falls, insulin-dependent type 2 diabetes, irritable bowel syndrome with chronic constipation, and recurrent urinary tract infections. She was from her facility with reported altered mentation and weakness. she was found to be disoriented more than her baseline, not following commands, and experiencing increased weakness in her bilateral lower extremities. She was initially treated as a brain attack however her CT head and CT angio head and neck are unremarkable for acute issue. The telestroke neurologist agrees that this is unlikely to be a stroke and more likely some form of encephalopathy.          COMPLETE REVIEW OF SYSTEMS:       All other reviewed and  "negative other than HPI.          PAST MEDICAL HISTORY: Medical History[1]     PAST SURGICAL HISTORY: Surgical History[2]     FAMILY HISTORY: Family History[3]     SOCIAL HISTORY: Social History[4]     CURRENT ALLERGIES:   Allergies as of 05/02/2025 - Reviewed 05/02/2025   Allergen Reaction Noted    Codeine Swelling 09/11/2006    Metformin Other 10/02/2024        MEDICATIONS:     Prior to Admission Medications:     Prescriptions Prior to Admission[5]     Scheduled medications  Scheduled Medications[6]  Continuous medications  Continuous Medications[7]  PRN medications  PRN Medications[8]         PHYSICAL EXAM:     Patient Vitals for the past 24 hrs:   BP Temp Temp src Pulse Resp SpO2 Height Weight   05/03/25 1515 93/50 36.4 °C (97.5 °F) Temporal 66 24 98 % -- --   05/03/25 1255 (!) 76/44 -- -- 68 20 99 % -- --   05/03/25 1250 87/52 36.7 °C (98.1 °F) -- 66 20 98 % -- --   05/03/25 1200 -- -- -- 70 25 -- -- --   05/03/25 0900 112/60 36.8 °C (98.2 °F) Temporal 78 20 96 % -- --   05/03/25 0800 -- -- -- 78 24 98 % -- --   05/03/25 0400 129/67 36.6 °C (97.9 °F) Temporal 72 18 100 % -- 96.2 kg (212 lb 1.3 oz)   05/03/25 0300 122/58 -- -- 62 15 97 % -- --   05/03/25 0200 106/51 -- -- 64 14 98 % -- --   05/03/25 0100 111/56 -- -- 66 17 97 % -- --   05/03/25 0000 106/53 35.5 °C (95.9 °F) Temporal 70 19 96 % -- --   05/02/25 2300 99/55 -- -- 70 19 94 % -- --   05/02/25 2215 120/61 36.3 °C (97.3 °F) Temporal 77 20 97 % 1.727 m (5' 8\") 95.2 kg (209 lb 14.1 oz)   05/02/25 2152 93/54 36.3 °C (97.3 °F) Temporal 77 (!) 22 96 % -- --   05/02/25 2145 (!) 83/45 -- -- 76 18 (!) 93 % -- --   05/02/25 2115 93/50 -- -- 80 (!) 24 96 % -- --   05/02/25 2100 (!) 95/48 -- -- 78 (!) 21 (!) 93 % -- --   05/02/25 2041 (!) 86/46 -- -- 80 20 96 % -- --   05/02/25 2030 (!) 83/46 -- -- 78 (!) 29 94 % -- --   05/02/25 2015 91/54 -- -- 86 20 95 % -- --   05/02/25 2000 98/54 -- -- 90 18 96 % -- --   05/02/25 1945 124/68 -- -- -- 20 -- -- --   05/02/25 " "1915 125/78 -- -- -- -- -- -- --   05/02/25 1900 133/83 -- -- 80 20 95 % -- --   05/02/25 1824 -- -- -- (!) 106 (!) 27 94 % -- --   05/02/25 1821 -- -- -- (!) 106 (!) 27 94 % -- --   05/02/25 1818 -- -- -- (!) 106 (!) 29 94 % -- --   05/02/25 1815 127/68 -- -- (!) 108 (!) 28 -- -- --   05/02/25 1812 -- -- -- (!) 110 (!) 23 -- -- --   05/02/25 1809 -- -- -- (!) 112 (!) 29 95 % -- --   05/02/25 1806 -- -- -- (!) 108 (!) 30 (!) 93 % -- --   05/02/25 1803 -- -- -- -- -- -- 1.702 m (5' 7\") 91 kg (200 lb 9.9 oz)   05/02/25 1803 -- -- -- (!) 108 (!) 31 (!) 93 % -- --   05/02/25 1800 143/62 -- -- (!) 106 20 (!) 93 % -- --   05/02/25 1743 161/78 37.4 °C (99.3 °F) Temporal (!) 106 16 96 % -- --        Body mass index is 32.25 kg/m².     Eyes: no conjunctiva erythema     ENMT: oral mucosa moist     Neck: symmetric, no mass     Cardiovascular: RRR     Respiratory: decreased breath sounds at lung base    GI: BS present, soft, lower abdominal tenderness    Skin: Warm and dry.     psychiatric: alert and cooperative          DATA:      Diagnostic tests reviewed for today's visit:       Labs from this admission reviewed     Imagines from this admission reviewed     Cultures: Reviewed             [1]   Past Medical History:  Diagnosis Date    Ambulatory dysfunction     Anxiety and depression     Asthma     Carpal tunnel syndrome     Cerebral atherosclerosis     Chronic back pain     Chronic kidney disease, stage II (mild)     Chronic nausea     COVID-19 vaccine administered     Dementia     Frequent falls     Frequent patient in emergency department     Gastroesophageal reflux disease     History of rib fracture     Hyperlipidemia     Hypertension     Hypothyroidism     IgA monoclonal gammopathy of uncertain significance     Insulin dependent type 2 diabetes mellitus (Multi)     Irritable bowel syndrome with predominant constipation     Lumbar spondylosis     Obstructive sleep apnea     Osteoarthritis     Partial thickness rotator " cuff tear     Peptic ulcer disease     Recurrent urinary tract infection     Restless leg syndrome     Type 2 diabetes mellitus with peripheral neuropathy     Vitamin D deficiency    [2]   Past Surgical History:  Procedure Laterality Date    CERVICAL BIOPSY      CHOLECYSTECTOMY      COLONOSCOPY      COLPOSCOPY      ENDOMETRIAL BIOPSY      ESOPHAGOGASTRODUODENOSCOPY      FLEXIBLE SIGMOIDOSCOPY      GASTROPLASTY      RADIOFREQUENCY ABLATION      SALIVARY GLAND SURGERY     [3]   Family History  Problem Relation Name Age of Onset    Alzheimer's disease Mother      Thyroid disease Mother      Diabetes Father      Stroke Father      Diabetes Sister      Hypertension Sister     [4]   Social History  Tobacco Use    Smoking status: Never    Smokeless tobacco: Never   [5]   Medications Prior to Admission   Medication Sig Dispense Refill Last Dose/Taking    amitriptyline (Elavil) 10 mg tablet Take 1 tablet (10 mg) by mouth once daily at bedtime.   5/1/2025 Bedtime    atorvastatin (Lipitor) 10 mg tablet Take 1 tablet (10 mg) by mouth once daily at bedtime.   5/1/2025 Bedtime    busPIRone (Buspar) 10 mg tablet Take 1 tablet (10 mg) by mouth 2 times a day.   5/2/2025 Morning    dicyclomine (Bentyl) 20 mg tablet Take 1 tablet (20 mg) by mouth 4 times a day before meals.   5/2/2025 Morning    empagliflozin (Jardiance) 25 mg tablet Take 1 tablet (25 mg) by mouth once daily.   5/2/2025 Morning    glimepiride (Amaryl) 4 mg tablet Take 1 tablet (4 mg) by mouth 2 times a day.   5/2/2025 Morning    insulin glargine (Lantus U-100 Insulin) 100 unit/mL injection Inject 10 Units under the skin once daily. Take as directed per insulin instructions.   5/2/2025 Morning    insulin lispro (HumaLOG KwikPen Insulin) 100 unit/mL pen Inject 0-5 Units under the skin 3 times daily (morning, midday, late afternoon). per sliding scale:  201-250 = 2 units, 251-300 = 3 units, 301-350 = 4 units, 351-400 = 5 units, >400 = call MD   5/2/2025 Morning     levothyroxine (Synthroid, Levoxyl) 75 mcg tablet Take 1 tablet (75 mcg) by mouth early in the morning.. Take on an empty stomach at the same time each day, either 30 to 60 minutes prior to breakfast   5/2/2025 Morning    lisinopril 20 mg tablet Take 1 tablet (20 mg) by mouth once daily.   5/2/2025 Morning    metoprolol succinate XL (Toprol-XL) 25 mg 24 hr tablet Take 1 tablet (25 mg) by mouth once daily. Do not crush or chew.   5/2/2025 Morning    nystatin (Mycostatin) 100,000 unit/gram powder Apply 1 Application topically 2 times a day. Apply under bilateral breasts, for excoriation. discontinue when resolved   5/2/2025 Morning    nystatin (Mycostatin) 100,000 unit/gram powder Apply 1 Application topically 2 times a day. Apply to buttocks and kelsi area. Also use PRN for any redness or excoriation   5/2/2025 Morning    polyethylene glycol (Glycolax, Miralax) 17 gram packet Take 17 g by mouth once daily.   5/2/2025 Morning    pregabalin (Lyrica) 50 mg capsule Take 1 capsule (50 mg) by mouth 2 times a day.   5/2/2025 Morning    venlafaxine (Effexor) 50 mg tablet Take 1 tablet (50 mg) by mouth once daily at bedtime.   5/1/2025 Bedtime    wound dressing (Triad Wound Dressing) paste Apply 1 Application topically 2 times a day. Apply to groin, discontinue when resolved   5/2/2025 Morning    acetaminophen (Tylenol) 325 mg tablet Take 2 tablets (650 mg) by mouth every 4 hours if needed for mild pain (1 - 3) or fever (temp greater than 38.0 C).   Unknown    acetaminophen (Tylenol) 650 mg suppository Insert 1 suppository (650 mg) into the rectum every 4 hours if needed for mild pain (1 - 3) or fever (temp greater than 38.0 C).   Unknown    alum-mag hydroxide-simeth (Mylanta) 200-200-20 mg/5 mL oral suspension Take 30 mL by mouth every 4 hours if needed for indigestion or heartburn.   Unknown    bisacodyl (Dulcolax, bisacodyl,) 10 mg suppository Insert 1 suppository (10 mg) into the rectum once daily as needed for  constipation.   Unknown    docusate sodium (Colace) 100 mg capsule Take 1 capsule (100 mg) by mouth 2 times a day as needed for constipation.   Unknown    famotidine (Pepcid) 20 mg tablet Take 1 tablet (20 mg) by mouth once daily as needed for indigestion.   Unknown    glucagon HCL (Glucagon, HCl, Emergency Kit) 1 mg recon soln Inject 1 mg as directed once daily as needed (low blood sugar).   Unknown    glycerin (Adult) 2 g suppository Insert 1 suppository into the rectum once daily as needed for constipation.   Unknown    guaiFENesin (Robitussin) 100 mg/5 mL syrup Take 10 mL (200 mg) by mouth every 4 hours if needed for cough.   Unknown    loperamide (Imodium) 2 mg capsule Take 1 capsule (2 mg) by mouth 4 times a day as needed for diarrhea.   Unknown    magnesium hydroxide (Milk of Magnesia) 400 mg/5 mL suspension Take 30 mL by mouth once daily as needed for constipation.   Unknown    metoclopramide (Reglan) 10 mg tablet Take 1 tablet (10 mg) by mouth every 4 hours if needed (peptic ulcer).   Unknown    ondansetron (Zofran) 4 mg tablet Take 1 tablet (4 mg) by mouth every 8 hours if needed for nausea or vomiting.   Unknown    polyethylene glycol (Glycolax, Miralax) 17 gram packet Take 17 g by mouth if needed.   Unknown    semaglutide (Ozempic) 0.25 mg or 0.5 mg(2 mg/1.5 mL) pen injector Inject 1 mg under the skin 1 (one) time per week.   4/26/2025    sodium phosphates (Fleet Enema) 19-7 gram/118 mL enema enema Insert 133 mL (1 enema) into the rectum once daily as needed for constipation.   Unknown    wound dressing (Triad Wound Dressing) paste Apply 1 Application topically once daily as needed. Apply to groin area   Unknown   [6] amitriptyline, 10 mg, oral, Nightly  ammonium lactate, , Topical, BID  atorvastatin, 10 mg, oral, Nightly  bisacodyl, 5 mg, oral, Nightly  bisacodyl, 10 mg, rectal, Daily  busPIRone, 10 mg, oral, BID  [Held by provider] dicyclomine, 20 mg, oral, Before meals & nightly  [Held by provider]  empagliflozin, 25 mg, oral, Daily  enoxaparin, 40 mg, subcutaneous, q24h  famotidine, 20 mg, oral, BID  glimepiride, 4 mg, oral, BID  insulin glargine, 10 Units, subcutaneous, Daily  insulin lispro, 0-5 Units, subcutaneous, Before meals & nightly  lactobacillus acidophilus, 1 tablet, oral, BID  levothyroxine, 75 mcg, oral, Daily  [Held by provider] lisinopril, 20 mg, oral, Daily  metoprolol succinate XL, 25 mg, oral, Daily  moisturizing mouth, 1 spray, oral, Before meals & nightly  nystatin, 1 Application, Topical, BID  piperacillin-tazobactam, 3.375 g, intravenous, q8h  polyethylene glycol, 17 g, oral, Daily  pregabalin, 50 mg, oral, BID  sodium chloride 0.9%, 10 mL, intravenous, q8h BAILEE  venlafaxine, 50 mg, oral, Nightly  [7] lactated Ringer's, 100 mL/hr, Last Rate: 100 mL/hr (05/02/25 6963)  [8] PRN medications: acetaminophen **OR** acetaminophen **OR** acetaminophen, dextrose, dextrose, glucagon, glucagon, glycerin, melatonin, moisturizing mouth, ondansetron ODT **OR** ondansetron, white petrolatum

## 2025-05-03 NOTE — CONSULTS
Vancomycin Dosing by Pharmacy- INITIAL    Lisa Vu is a 64 y.o. year old female who Pharmacy has been consulted for vancomycin dosing for other UTI. Based on the patient's indication and renal status this patient will be dosed based on a goal AUC of 400-600.     Renal function is currently stable.    Visit Vitals  BP 93/50   Pulse 66   Temp 36.4 °C (97.5 °F) (Temporal)   Resp 22        Lab Results   Component Value Date    CREATININE 0.98 2025    CREATININE 0.96 2025        Patient weight is as follows:   Vitals:    25 0400   Weight: 96.2 kg (212 lb 1.3 oz)       Cultures:  No results found for the encounter in last 14 days.        I/O last 3 completed shifts:  In: 1550 (16.1 mL/kg) [I.V.:350 (3.6 mL/kg); IV Piggyback:1200]  Out: 200 (2.1 mL/kg) [Urine:200 (0.1 mL/kg/hr)]  Weight: 96.2 kg   I/O during current shift:  No intake/output data recorded.    Temp (24hrs), Av.5 °C (97.7 °F), Min:35.5 °C (95.9 °F), Max:37.4 °C (99.3 °F)         Assessment/Plan     Patient will not be given a loading dose.  Will initiate vancomycin maintenance, 1000 mg every 12 hours.    This dosing regimen is predicted by [employer] to result in the following pharmacokinetic parameters:  Regimen: 1000 mg IV every 12 hours.  Start time: 20:20 on 2025  Exposure target: AUC24 (range)400-600 mg/L.hr   [id number]: 461 mg/L.hr  AUC24,ss: 552 mg/L.hr  Probability of AUC24 > 400: 82 %  Ctrough,ss: 18.6 mg/L  Probability of Ctrough,ss > 20: 43 %    Follow-up level will be ordered on  at 0500 unless clinically indicated sooner.  Will continue to monitor renal function daily while on vancomycin and order serum creatinine at least every 48 hours if not already ordered.  Follow for continued vancomycin needs, clinical response, and signs/symptoms of toxicity.       SONIA MCADAMS

## 2025-05-03 NOTE — PROGRESS NOTES
"Nutrition Initial Assessment:   Nutrition Assessment    Reason for Assessment: Provider consult order (\"Poor water intake, poorly controlled diabetes, hypoalbuminemia, hypocalcemia\")    Patient is a 64 y.o. female presenting with concern for altered mental status, not following commands.     History of type 2 diabetes, CKD, GERD, hypothyroidism, hyperlipidemia     Nutrition History:  Energy Intake: Fair 50-75 %  Pain affecting nutrition status: Yes  If yes, Informed Nursing: Pt states everything she eats gets \"stuck\" in her upper stomach area...and this has been ongoing for like 3 years. She also complains of the entire right side of her body (head to toe) is in pain.  Food and Nutrient History: Pt reports that she has been at [facility] only for 1 month. (Prior to this she was at [facility] for ~4 months where apparently she was moving around with her walker.) Called [facility] and the nurse confirmed that she has not been there long and is now primarily bedbound. Pt explains \"with neuropathy you never know when that will hit & now she cannot get out of bed, but she is hoping to order an electric w/c\". Pt explained she has been on Ozempic and has been losing weight (as a result of not having any appetite). She reported she lost from 250 to 181 lbs all in the matter of 8 months. Nurse was not sure about this \"weight loss\" and reported her last weight on 4/08 was 204.4 lbs. Admit weight at [facility] 5/02 was 212 lb. Both patient and nurse confirmed that pt prefers to remain in her bed and will take all meals in her bed. She is on a Consistent Carb diet and accepts what is served most days. Pt states she caba snot snack much, but has \"tiny\" pretzels that she will nibble on in the evening. She is aware she has diabetes and does seem to eat accordingly. For bfast, she eats a few bites of fruit and maybe yogurt. She likes beltre, but only eats 1/2 sausage if this is served. For Lunch & Dinner, she avoids bread but " "will eat the meat and potatoes. She also \"allows\" herself pudding or ice cream and loves to drink V-8.  Food Intolerance:  (Pt refuses to drink orange juice and avoids eating bread. She also avoids grapes because they have too much sugar. She will not eat \"solid/heavy\" meats either.)       Anthropometrics:  Height: 172.7 cm (5' 8\")   Weight: 96.2 kg (212 lb 1.3 oz)   BMI (Calculated): 32.25  IBW/kg (Dietitian Calculated): 63.5 kg  Percent of IBW: 149.91 %                      Weight History:   [facility] reports last weight was 204.4 lbs (4/08/25)   Per electronic chart: 1/24/25 = 198 lb; 12/02/24 = 200 lb; 6/21/23 = 205 lb  Weight Change %:  Weight History / % Weight Change: [facility] reports no significant weight changes that they are aware of.  Significant Weight Loss: No    Nutrition Focused Physical Exam Findings:    Subcutaneous Fat Loss:   Orbital Fat Pads: Well nourished (slightly bulging fat pads)  Buccal Fat Pads: Well nourished (full, rounded cheeks)  Triceps: Well nourished (ample fat tissue)  Ribs: Well nourished (chest is full, ribs do not show, slight to no protrusion of the iliac crest)  Muscle Wasting:  Temporalis: Well nourished (well-defined muscle)  Pectoralis (Clavicular Region): Well nourished (clavicle not visible)  Deltoid/Trapezius: Well nourished (rounded appearance at arm, shoulder, neck)  Interosseous: Well nourished (muscle bulges)  Edema:  Edema: +1 trace  Edema Location: non-pitting  Physical Findings:  Hair: Negative  Eyes: Negative  Nails: Negative  Skin: Negative  Respiratory : Negative  Digestive System Findings: Early satiety  Mouth Findings: Dysphagia    Nutrition Significant Labs:  POCT glucose 156-222CBC Trend:   Results from last 7 days   Lab Units 05/03/25  0515 05/02/25  1812   WBC AUTO x10*3/uL 17.7* 18.3*   RBC AUTO x10*6/uL 4.33 4.72   HEMOGLOBIN g/dL 11.9* 13.2   HEMATOCRIT % 39.3 40.7   MCV fL 91 86   PLATELETS AUTO x10*3/uL 244 314    , BMP Trend:   Results from last 7 days   Lab " Units 05/03/25  0515 05/02/25  1812   GLUCOSE mg/dL 192* 223*   CALCIUM mg/dL 8.6 8.5*   SODIUM mmol/L 136 137   POTASSIUM mmol/L 4.2 3.7   CO2 mmol/L 23 24   CHLORIDE mmol/L 102 101   BUN mg/dL 16 15   CREATININE mg/dL 0.98 0.96    , A1C:  Lab Results   Component Value Date    HGBA1C 12.9 (H) 01/26/2023   , BG POCT trend:   Results from last 7 days   Lab Units 05/03/25  1154 05/03/25  0905 05/02/25  2241 05/02/25  1745   POCT GLUCOSE mg/dL 175* 156* 222* 222*            I/O:   Last BM Date: 05/03/25; Stool Appearance: Loose (05/02/25 2215)    Dietary Orders (From admission, onward)       Start     Ordered    05/03/25 1003  Adult diet Clear Liquid  Diet effective now        Question:  Diet type  Answer:  Clear Liquid    05/03/25 1002    05/02/25 2221  May Participate in Room Service  ( ROOM SERVICE MAY PARTICIPATE)  Once        Question:  .  Answer:  Yes    05/02/25 2220                     Estimated Needs:   Total Energy Estimated Needs in 24 hours (kCal): 1867 kCal  Method for Estimating Needs: MSJ (1556) x 1.2 x 1.0  Total Protein Estimated Needs in 24 Hours (g):  (64-76)  Method for Estimating 24 Hour Protein Needs: 1.0-1.2g/kg IBW  Total Fluid Estimated Needs in 24 Hours (mL): 1900 mL  Method for Estimating 24 Hour Fluid Needs: 1 mL/kcal        Nutrition Diagnosis   Malnutrition Diagnosis  Patient has Malnutrition Diagnosis: No    Nutrition Diagnosis  Patient has Nutrition Diagnosis: Yes  Diagnosis Status (1): New  Nutrition Diagnosis 1: Obesity class I  Related to (1): sedentary lifestyle  As Evidenced by (1): BMI = 32.25 kg/m2; pt prefers to remain in bed.  Additional Nutrition Diagnosis: Diagnosis 2  Diagnosis Status (2): New  Nutrition Diagnosis 2: Altered nutrition related to laboratory values  Related to (2): glycemic control  As Evidenced by (2): 156-222 mg/dL       Nutrition Interventions/Recommendations   Nutrition prescription for oral nutrition    Nutrition Recommendations:  Individualized  Nutrition Prescription Provided for : Pt downgraded to CLEARS per nursing for possible ileus. Follow pysician orders for diet advancement. Prior to admit, pt was on a CONSISTENT CARBOHYDRATE diet with regular textures/thin liquids. Suggest consulting SLP if pt continues to complain of dysphagia and this is not a GI issue.    Nutrition Interventions/Goals:   Additional Interventions: Pt interested in an oral nutrition supplement. Will continue to follow and trial as appropriate when diet advances.             Nutrition Monitoring and Evaluation   Food/Nutrient Related History Monitoring  Monitoring and Evaluation Plan: Estimated Energy Intake  Estimated Energy Intake: Energy intake greater or equal to 75% of estimated energy needs  Additional Plans: within the next 72 hours.    Anthropometric Measurements  Monitoring and Evaluation Plan: Body weight  Body Weight: Body weight - Maintain stable weight    Biochemical Data, Medical Tests and Procedures  Monitoring and Evaluation Plan: Glucose/endocrine profile  Glucose/Endocrine Profile: Glucose within normal limits ( mg/dL), Hemoglobin A1c (HgbA1c)    Physical Exam Findings  Monitoring and Evaluation Plan: Digestive System  Criteria: Continue to follow diet advancement and tolerance.    Goal Status: New goal(s) identified    Time Spent (min): 45 minutes

## 2025-05-03 NOTE — PROGRESS NOTES
"Advanced Practice Admit Note    Lisa Vu is a 64 y.o. female presenting to [facility] on 5/2/2025 with Toxic metabolic encephalopathy. Medical history is significant for dementia, stage II chronic kidney disease, ambulatory dysfunction with frequent falls, insulin-dependent type 2 diabetes, irritable bowel syndrome with chronic constipation, and recurrent urinary tract infections.    She comes in tonight from her facility with reported altered mentation and weakness.  Apparently Lisa was found to be disoriented more than her baseline, not following commands, and experiencing increased weakness in her bilateral lower extremities.  She was initially treated as a brain attack however her CT head and CT angio head and neck are unremarkable for acute issue.  The telestroke neurologist agrees that this is unlikely to be a stroke and more likely some form of encephalopathy.    At the time of my examination in the emergency department she was awake, alert, and cooperative.  She had some difficulty remembering details of her medical history but was able to answer questions for me and could move all of her extremities without difficulty.  During our time together she mentioned to me that she was very thirsty and asked if she could have some water.  On exam her mucous membranes are dry and her lips are cracked and exfoliative.  She was able to swallow water without difficulty, though I noticed it took her a long time and multiple swallows before she asked for another sip.  She told me \"I have to swallow 8 times with every sip so that it does not go down the wrong tube again\".  She said that she has had issues with this in the past and is scared of a recurrence but does not have any problems swallowing right this moment.  She was surprised to hear that she has a urinary tract infection because she is not having any dysuria.    Labs tonight are noteworthy for white blood cell count of 18.3 with neutrophilic " predominance, initial lactate of 2.5, blood glucose of 223, urinalysis with evidence of urinary tract infection, and stable chronic kidney disease with creatinine of 0.96 and GFR of 66 compared to 0.95 and 66 on January 8, 2025.    Chart review indicates that she has had multiple abnormal urinalyses in the past that have been sent for culture that did not grow anything.  I was able to find a previous culture that grew Klebsiella pneumonia that was sensitive to ceftriaxone and Zosyn.  She was initially started on ceftriaxone however during her stay in the emergency department her lactate continued to increase and she developed some hemodynamic instability with blood pressure as low as 83/46.  She meets severe sepsis criteria with tachycardia, tachypnea, leukocytosis, elevated lactate, and urinary tract infection.  Aggressive IV fluid resuscitation is underway and she was started on Zosyn while new urine and blood cultures are pending.    Past Medical History  Past Medical History:   Diagnosis Date    Ambulatory dysfunction     Anxiety and depression     Asthma     Carpal tunnel syndrome     Cerebral atherosclerosis     Chronic back pain     Chronic kidney disease, stage II (mild)     Chronic nausea     COVID-19 vaccine administered     Dementia     Frequent falls     Frequent patient in emergency department     Gastroesophageal reflux disease     History of rib fracture     Hyperlipidemia     Hypertension     Hypothyroidism     IgA monoclonal gammopathy of uncertain significance     Insulin dependent type 2 diabetes mellitus (Multi)     Irritable bowel syndrome with predominant constipation     Lumbar spondylosis     Obstructive sleep apnea     Osteoarthritis     Partial thickness rotator cuff tear     Peptic ulcer disease     Recurrent urinary tract infection     Restless leg syndrome     Type 2 diabetes mellitus with peripheral neuropathy     Vitamin D deficiency        Surgical History  Past Surgical History:    Procedure Laterality Date    CERVICAL BIOPSY      CHOLECYSTECTOMY      COLONOSCOPY      COLPOSCOPY      ENDOMETRIAL BIOPSY      ESOPHAGOGASTRODUODENOSCOPY      FLEXIBLE SIGMOIDOSCOPY      GASTROPLASTY      RADIOFREQUENCY ABLATION      SALIVARY GLAND SURGERY          Social History  Social History     Tobacco Use    Smoking status: Never    Smokeless tobacco: Never        Family History  Family History   Problem Relation Name Age of Onset    Alzheimer's disease Mother      Thyroid disease Mother      Diabetes Father      Stroke Father      Diabetes Sister      Hypertension Sister          Allergies  Codeine and Metformin    Review of Systems   Constitutional:  Positive for fatigue. Negative for chills and fever.   HENT:  Negative for sore throat and trouble swallowing.    Eyes:  Negative for visual disturbance.   Respiratory:  Negative for cough, chest tightness and shortness of breath.    Cardiovascular:  Negative for chest pain, palpitations and leg swelling.   Gastrointestinal:  Negative for abdominal pain, nausea and vomiting.   Genitourinary:  Negative for dysuria and flank pain.   Musculoskeletal:  Negative for neck pain and neck stiffness.   Skin:  Negative for rash and wound.   Neurological:  Positive for weakness. Negative for dizziness and headaches.   Psychiatric/Behavioral:  Positive for confusion.        Physical Exam  Constitutional:       General: She is not in acute distress.     Appearance: Normal appearance. She is ill-appearing.   HENT:      Head: Normocephalic.      Right Ear: External ear normal.      Left Ear: External ear normal.      Nose: Nose normal.      Mouth/Throat:      Mouth: Mucous membranes are dry.   Eyes:      General: No scleral icterus.  Cardiovascular:      Rate and Rhythm: Regular rhythm. Tachycardia present.      Pulses: Normal pulses.   Pulmonary:      Effort: Pulmonary effort is normal. No respiratory distress.   Abdominal:      General: Bowel sounds are normal. There is no  "distension.      Palpations: Abdomen is soft.      Tenderness: There is no abdominal tenderness.   Musculoskeletal:      Cervical back: No rigidity or tenderness.      Right lower leg: No edema.      Left lower leg: No edema.   Skin:     Capillary Refill: Capillary refill takes 2 to 3 seconds.      Findings: No erythema, lesion or rash.   Neurological:      General: No focal deficit present.      Mental Status: She is alert. She is disoriented.   Psychiatric:         Mood and Affect: Mood normal.         Behavior: Behavior normal.         Last Recorded Vitals  Blood pressure 122/58, pulse 62, temperature 35.5 °C (95.9 °F), temperature source Temporal, resp. rate 15, height 1.727 m (5' 8\"), weight 95.2 kg (209 lb 14.1 oz), SpO2 97%.  Intake/Output last 3 Shifts:  No intake/output data recorded.    Relevant Results  Results for orders placed or performed during the hospital encounter of 05/02/25 (from the past 24 hours)   POCT GLUCOSE   Result Value Ref Range    POCT Glucose 222 (H) 74 - 99 mg/dL   CBC and Auto Differential   Result Value Ref Range    WBC 18.3 (H) 4.4 - 11.3 x10*3/uL    nRBC 0.0 0.0 - 0.0 /100 WBCs    RBC 4.72 4.00 - 5.20 x10*6/uL    Hemoglobin 13.2 12.0 - 16.0 g/dL    Hematocrit 40.7 36.0 - 46.0 %    MCV 86 80 - 100 fL    MCH 28.0 26.0 - 34.0 pg    MCHC 32.4 32.0 - 36.0 g/dL    RDW 15.2 (H) 11.5 - 14.5 %    Platelets 314 150 - 450 x10*3/uL    Neutrophils % 89.5 40.0 - 80.0 %    Immature Granulocytes %, Automated 0.9 0.0 - 0.9 %    Lymphocytes % 3.5 13.0 - 44.0 %    Monocytes % 5.7 2.0 - 10.0 %    Eosinophils % 0.1 0.0 - 6.0 %    Basophils % 0.3 0.0 - 2.0 %    Neutrophils Absolute 16.39 (H) 1.20 - 7.70 x10*3/uL    Immature Granulocytes Absolute, Automated 0.16 0.00 - 0.70 x10*3/uL    Lymphocytes Absolute 0.65 (L) 1.20 - 4.80 x10*3/uL    Monocytes Absolute 1.05 (H) 0.10 - 1.00 x10*3/uL    Eosinophils Absolute 0.02 0.00 - 0.70 x10*3/uL    Basophils Absolute 0.05 0.00 - 0.10 x10*3/uL   Comprehensive " metabolic panel   Result Value Ref Range    Glucose 223 (H) 74 - 99 mg/dL    Sodium 137 136 - 145 mmol/L    Potassium 3.7 3.5 - 5.3 mmol/L    Chloride 101 98 - 107 mmol/L    Bicarbonate 24 21 - 32 mmol/L    Anion Gap 16 10 - 20 mmol/L    Urea Nitrogen 15 6 - 23 mg/dL    Creatinine 0.96 0.50 - 1.05 mg/dL    eGFR 66 >60 mL/min/1.73m*2    Calcium 8.5 (L) 8.6 - 10.3 mg/dL    Albumin 3.2 (L) 3.4 - 5.0 g/dL    Alkaline Phosphatase 70 33 - 136 U/L    Total Protein 6.7 6.4 - 8.2 g/dL    AST 13 9 - 39 U/L    Bilirubin, Total 0.7 0.0 - 1.2 mg/dL    ALT 9 7 - 45 U/L   Troponin I, High Sensitivity   Result Value Ref Range    Troponin I, High Sensitivity 6 0 - 13 ng/L   Protime-INR   Result Value Ref Range    Protime 12.2 9.8 - 12.4 seconds    INR 1.1 0.9 - 1.1   APTT   Result Value Ref Range    aPTT 27 26 - 36 seconds   Lactate   Result Value Ref Range    Lactate 2.5 (H) 0.4 - 2.0 mmol/L   Blood Culture    Specimen: Peripheral Venipuncture; Blood culture   Result Value Ref Range    Blood Culture Loaded on Instrument - Culture in progress    TSH with reflex to Free T4 if abnormal   Result Value Ref Range    Thyroid Stimulating Hormone 2.82 0.44 - 3.98 mIU/L   Magnesium   Result Value Ref Range    Magnesium 1.51 (L) 1.60 - 2.40 mg/dL   Phosphorus   Result Value Ref Range    Phosphorus 3.0 2.5 - 4.9 mg/dL   Blood Culture    Specimen: Peripheral Venipuncture; Blood culture   Result Value Ref Range    Blood Culture Loaded on Instrument - Culture in progress    Urinalysis with Reflex Culture and Microscopic   Result Value Ref Range    Color, Urine Yellow Straw, Yellow    Appearance, Urine Cloudy (N) Clear    Specific Gravity, Urine 1.015 1.005 - 1.035    pH, Urine 5.0 5.0, 5.5, 6.0, 6.5, 7.0, 7.5, 8.0    Protein, Urine 30 (1+) (A) NEGATIVE, 10 (TRACE) mg/dL    Glucose, Urine 1000 (4+) (A) NEGATIVE mg/dL    Blood, Urine 0.2 (2+) (A) NEGATIVE mg/dL    Ketones, Urine 10 (1+) (A) NEGATIVE mg/dL    Bilirubin, Urine NEGATIVE NEGATIVE mg/dL     Urobilinogen, Urine NORM NORM mg/dL    Nitrite, Urine NEGATIVE NEGATIVE    Leukocyte Esterase, Urine 500 Robert/uL (A) NEGATIVE   Microscopic Only, Urine   Result Value Ref Range    WBC, Urine >50 (A) 1-5, NONE /HPF    WBC Clumps, Urine MANY Reference range not established. /HPF    RBC, Urine >20 (A) NONE, 1-2, 3-5 /HPF    Bacteria, Urine 4+ (A) NONE SEEN /HPF    Mucus, Urine 1+ Reference range not established. /LPF   Lactate   Result Value Ref Range    Lactate 2.7 (H) 0.4 - 2.0 mmol/L   POCT GLUCOSE   Result Value Ref Range    POCT Glucose 222 (H) 74 - 99 mg/dL   Coagulation Screen   Result Value Ref Range    Protime 12.5 (H) 9.8 - 12.4 seconds    INR 1.1 0.9 - 1.1    aPTT 29 26 - 36 seconds   Lactate   Result Value Ref Range    Lactate 1.9 0.4 - 2.0 mmol/L        Medications  Scheduled medications  Scheduled Medications[1]  Continuous medications  Continuous Medications[2]  PRN medications  acetaminophen, 650 mg, q4h PRN   Or  acetaminophen, 650 mg, q4h PRN   Or  acetaminophen, 650 mg, q4h PRN  dextrose, 12.5 g, q15 min PRN  dextrose, 25 g, q15 min PRN  glucagon, 1 mg, q15 min PRN  glucagon, 1 mg, q15 min PRN  glycerin, 1 suppository, Daily PRN  melatonin, 3 mg, Nightly PRN  moisturizing mouth, 1 spray, PRN  ondansetron ODT, 4 mg, q8h PRN   Or  ondansetron, 4 mg, q8h PRN  white petrolatum, , q1h PRN        Imaging  XR abdomen 1 view  Result Date: 5/2/2025  Dilated small bowel loop in the left abdomen measuring 3.7 cm in diameter may be secondary to ileus, however if there is persistent concern for acute abdominal pathology, CT is recommended for further evaluation.   MACRO: None   Signed by: Erik Morton 5/2/2025 8:45 PM Dictation workstation:   [id number]    CT brain attack head wo IV contrast  Result Date: 5/2/2025  No evidence of acute cortical infarct or intracranial hemorrhage. Volume loss. Degree does appear to be advanced for age. If persistent concern, consider MRI   MACRO: Anirudh Gabriel message by epic  leti JALLOH on 5/2/2025 at 6:09 pm.  (**-RCF-**) Findings:  See findings.     Signed by: Anirudh Gabriel 5/2/2025 6:10 PM Dictation workstation:   [id number]    CT brain attack angio head and neck W and WO IV contrast  Result Date: 5/2/2025  1. CTA of Neck arteries demonstrates no significant flow-limiting stenosis or dissection. 2. CTA of Intracranial arteries demonstrates no evidence for large vessel occlusion or other acute findings. 3. Multifocal mild atherosclerosis, without evidence for hemodynamically significant stenosis.   MACRO: None   Signed by: Ted Vera 5/2/2025 6:08 PM Dictation workstation:   [id number]      Cardiology, Vascular, and Other Imaging  No other imaging results found for the past 7 days     Assessment & Plan  Toxic metabolic encephalopathy  Nursing bedside swallow evaluation, aspiration precautions, fall precautions  Cystitis with hematuria  Initiated Zosyn after reviewing previous cultures, new culture pending, probiotic  Hypovolemia dehydration  Relatively aggressive IV fluid resuscitation, speech eval to promote optimal oral intake  Severe sepsis with acute organ dysfunction (Multi)  Lactate has normalized, continue gentle IV fluid, trend white blood cell count, telemetry monitoring, watch for fever, blood cultures pending    I spent 55 minutes in the professional and overall care of this patient.    Code Status: Full code    See orders for additional plan elements, full history and physical to follow from attending physician.    Lillian Ahumada, APRN-CNP  [facility] [phone]  Attending physician Sushil Orozco MD         [1] amitriptyline, 10 mg, oral, Nightly  ammonium lactate, , Topical, BID  atorvastatin, 10 mg, oral, Nightly  bisacodyl, 5 mg, oral, Nightly  busPIRone, 10 mg, oral, BID  [Held by provider] dicyclomine, 20 mg, oral, Before meals & nightly  [Held by provider] empagliflozin, 25 mg, oral, Daily  enoxaparin, 40 mg, subcutaneous, q24h  famotidine, 20  mg, oral, BID  glimepiride, 4 mg, oral, BID  insulin glargine, 10 Units, subcutaneous, Daily  insulin lispro, 0-5 Units, subcutaneous, Before meals & nightly  lactobacillus acidophilus, 1 tablet, oral, BID  levothyroxine, 75 mcg, oral, Daily  lisinopril, 20 mg, oral, Daily  metoprolol succinate XL, 25 mg, oral, Daily  moisturizing mouth, 1 spray, oral, Before meals & nightly  nystatin, 1 Application, Topical, BID  piperacillin-tazobactam, 3.375 g, intravenous, q8h  polyethylene glycol, 17 g, oral, Daily  pregabalin, 50 mg, oral, BID  sodium chloride 0.9%, 10 mL, intravenous, q8h BAILEE  venlafaxine, 50 mg, oral, Nightly  [2] lactated Ringer's, 100 mL/hr, Last Rate: 100 mL/hr (05/02/25 [id number])

## 2025-05-03 NOTE — H&P
History Of Present Illness  Lisa Vu is a 64 y.o. F from [facility] with MMP including asthma, CVA, CKD, dementia, frequent fall, HLD, HTN, hypothyroidism,  T2DM, IBS, JALEN, presented to ER due to AMS.  Patient was thought to have a stroke hence [facility] staff sent her to ER.  Patient CT head and CT angio neck did not show any acute finding, per telemetry stroke neurology team her symptoms possibly were due to encephalopathy not due to stroke. Afterer initial workup in the ER she was thought to have metabolic encephalopathy due to UTI.  Patient was admitted on the stepdown unit for further care.  History is limited due to underlying condition, most of the history was obtained from chart and with my discussion with the ER physician reviewing the notes in the computer.          Past Medical History  She has a past medical history of Ambulatory dysfunction, Anxiety and depression, Asthma, Carpal tunnel syndrome, Cerebral atherosclerosis, Chronic back pain, Chronic kidney disease, stage II (mild), Chronic nausea, COVID-19 vaccine administered, Dementia, Frequent falls, Frequent patient in emergency department, Gastroesophageal reflux disease, History of rib fracture, Hyperlipidemia, Hypertension, Hypothyroidism, IgA monoclonal gammopathy of uncertain significance, Insulin dependent type 2 diabetes mellitus (Multi), Irritable bowel syndrome with predominant constipation, Lumbar spondylosis, Obstructive sleep apnea, Osteoarthritis, Partial thickness rotator cuff tear, Peptic ulcer disease, Recurrent urinary tract infection, Restless leg syndrome, Type 2 diabetes mellitus with peripheral neuropathy, and Vitamin D deficiency.    Surgical History  She has a past surgical history that includes Colposcopy; Esophagogastroduodenoscopy; Gastroplasty; Cholecystectomy; Salivary gland surgery; Radiofrequency ablation; Flexible sigmoidoscopy; Colonoscopy; Cervical biopsy; and Endometrial biopsy.     Social History  She reports that she  "has never smoked. She has never used smokeless tobacco. No history on file for alcohol use and drug use.    Family History  Family History[1]     Allergies  Codeine and Metformin    Current medications:    Current Medications[2]       Review of Systems    Limited due to underlying condition,     Physical Exam  Constitutional:       Comments: Awake and alert, lying in bed, no acute distress   HENT:      Head: Normocephalic.   Eyes:      Conjunctiva/sclera: Conjunctivae normal.   Cardiovascular:      Rate and Rhythm: Normal rate and regular rhythm.   Pulmonary:      Effort: Pulmonary effort is normal.      Breath sounds: Normal breath sounds.   Abdominal:      General: Bowel sounds are normal.      Palpations: Abdomen is soft.   Musculoskeletal:      Comments: No edema   Skin:     General: Skin is warm and dry.   Neurological:      Comments: Awake and alert, moving all 4 limbs   Psychiatric:         Mood and Affect: Mood normal.              Last Recorded Vitals      Blood pressure 93/50, pulse 66, temperature 36.4 °C (97.5 °F), temperature source Temporal, resp. rate 22, height 1.727 m (5' 8\"), weight 96.2 kg (212 lb 1.3 oz), SpO2 98%.    Relevant Results     Results for orders placed or performed during the hospital encounter of 05/02/25 (from the past 24 hours)   POCT GLUCOSE   Result Value Ref Range    POCT Glucose 222 (H) 74 - 99 mg/dL   ECG 12 lead   Result Value Ref Range    Ventricular Rate 109 BPM    Atrial Rate 109 BPM    MO Interval 164 ms    QRS Duration 94 ms    QT Interval 326 ms    QTC Calculation(Bazett) 439 ms    P Axis 74 degrees    R Axis -47 degrees    T Axis 80 degrees    QRS Count 18 beats    Q Onset 210 ms    P Onset 128 ms    P Offset 185 ms    T Offset 373 ms    QTC Fredericia 397 ms   CBC and Auto Differential   Result Value Ref Range    WBC 18.3 (H) 4.4 - 11.3 x10*3/uL    nRBC 0.0 0.0 - 0.0 /100 WBCs    RBC 4.72 4.00 - 5.20 x10*6/uL    Hemoglobin 13.2 12.0 - 16.0 g/dL    Hematocrit 40.7 36.0 " - 46.0 %    MCV 86 80 - 100 fL    MCH 28.0 26.0 - 34.0 pg    MCHC 32.4 32.0 - 36.0 g/dL    RDW 15.2 (H) 11.5 - 14.5 %    Platelets 314 150 - 450 x10*3/uL    Neutrophils % 89.5 40.0 - 80.0 %    Immature Granulocytes %, Automated 0.9 0.0 - 0.9 %    Lymphocytes % 3.5 13.0 - 44.0 %    Monocytes % 5.7 2.0 - 10.0 %    Eosinophils % 0.1 0.0 - 6.0 %    Basophils % 0.3 0.0 - 2.0 %    Neutrophils Absolute 16.39 (H) 1.20 - 7.70 x10*3/uL    Immature Granulocytes Absolute, Automated 0.16 0.00 - 0.70 x10*3/uL    Lymphocytes Absolute 0.65 (L) 1.20 - 4.80 x10*3/uL    Monocytes Absolute 1.05 (H) 0.10 - 1.00 x10*3/uL    Eosinophils Absolute 0.02 0.00 - 0.70 x10*3/uL    Basophils Absolute 0.05 0.00 - 0.10 x10*3/uL   Comprehensive metabolic panel   Result Value Ref Range    Glucose 223 (H) 74 - 99 mg/dL    Sodium 137 136 - 145 mmol/L    Potassium 3.7 3.5 - 5.3 mmol/L    Chloride 101 98 - 107 mmol/L    Bicarbonate 24 21 - 32 mmol/L    Anion Gap 16 10 - 20 mmol/L    Urea Nitrogen 15 6 - 23 mg/dL    Creatinine 0.96 0.50 - 1.05 mg/dL    eGFR 66 >60 mL/min/1.73m*2    Calcium 8.5 (L) 8.6 - 10.3 mg/dL    Albumin 3.2 (L) 3.4 - 5.0 g/dL    Alkaline Phosphatase 70 33 - 136 U/L    Total Protein 6.7 6.4 - 8.2 g/dL    AST 13 9 - 39 U/L    Bilirubin, Total 0.7 0.0 - 1.2 mg/dL    ALT 9 7 - 45 U/L   Troponin I, High Sensitivity   Result Value Ref Range    Troponin I, High Sensitivity 6 0 - 13 ng/L   Protime-INR   Result Value Ref Range    Protime 12.2 9.8 - 12.4 seconds    INR 1.1 0.9 - 1.1   APTT   Result Value Ref Range    aPTT 27 26 - 36 seconds   Lactate   Result Value Ref Range    Lactate 2.5 (H) 0.4 - 2.0 mmol/L   Blood Culture    Specimen: Peripheral Venipuncture; Blood culture   Result Value Ref Range    Blood Culture Loaded on Instrument - Culture in progress    TSH with reflex to Free T4 if abnormal   Result Value Ref Range    Thyroid Stimulating Hormone 2.82 0.44 - 3.98 mIU/L   Magnesium   Result Value Ref Range    Magnesium 1.51 (L) 1.60 -  2.40 mg/dL   Phosphorus   Result Value Ref Range    Phosphorus 3.0 2.5 - 4.9 mg/dL   Blood Culture    Specimen: Peripheral Venipuncture; Blood culture   Result Value Ref Range    Blood Culture Loaded on Instrument - Culture in progress    Urinalysis with Reflex Culture and Microscopic   Result Value Ref Range    Color, Urine Yellow Straw, Yellow    Appearance, Urine Cloudy (N) Clear    Specific Gravity, Urine 1.015 1.005 - 1.035    pH, Urine 5.0 5.0, 5.5, 6.0, 6.5, 7.0, 7.5, 8.0    Protein, Urine 30 (1+) (A) NEGATIVE, 10 (TRACE) mg/dL    Glucose, Urine 1000 (4+) (A) NEGATIVE mg/dL    Blood, Urine 0.2 (2+) (A) NEGATIVE mg/dL    Ketones, Urine 10 (1+) (A) NEGATIVE mg/dL    Bilirubin, Urine NEGATIVE NEGATIVE mg/dL    Urobilinogen, Urine NORM NORM mg/dL    Nitrite, Urine NEGATIVE NEGATIVE    Leukocyte Esterase, Urine 500 Robert/uL (A) NEGATIVE   Extra Urine Gray Tube   Result Value Ref Range    Extra Tube Hold for add-ons.    Microscopic Only, Urine   Result Value Ref Range    WBC, Urine >50 (A) 1-5, NONE /HPF    WBC Clumps, Urine MANY Reference range not established. /HPF    RBC, Urine >20 (A) NONE, 1-2, 3-5 /HPF    Bacteria, Urine 4+ (A) NONE SEEN /HPF    Mucus, Urine 1+ Reference range not established. /LPF   Lactate   Result Value Ref Range    Lactate 2.7 (H) 0.4 - 2.0 mmol/L   POCT GLUCOSE   Result Value Ref Range    POCT Glucose 222 (H) 74 - 99 mg/dL   Coagulation Screen   Result Value Ref Range    Protime 12.5 (H) 9.8 - 12.4 seconds    INR 1.1 0.9 - 1.1    aPTT 29 26 - 36 seconds   Lactate   Result Value Ref Range    Lactate 1.9 0.4 - 2.0 mmol/L   Prealbumin   Result Value Ref Range    Prealbumin 11.2 (L) 18.0 - 40.0 mg/dL   CBC   Result Value Ref Range    WBC 17.7 (H) 4.4 - 11.3 x10*3/uL    nRBC 0.0 0.0 - 0.0 /100 WBCs    RBC 4.33 4.00 - 5.20 x10*6/uL    Hemoglobin 11.9 (L) 12.0 - 16.0 g/dL    Hematocrit 39.3 36.0 - 46.0 %    MCV 91 80 - 100 fL    MCH 27.5 26.0 - 34.0 pg    MCHC 30.3 (L) 32.0 - 36.0 g/dL    RDW  15.7 (H) 11.5 - 14.5 %    Platelets 244 150 - 450 x10*3/uL   Basic metabolic panel   Result Value Ref Range    Glucose 192 (H) 74 - 99 mg/dL    Sodium 136 136 - 145 mmol/L    Potassium 4.2 3.5 - 5.3 mmol/L    Chloride 102 98 - 107 mmol/L    Bicarbonate 23 21 - 32 mmol/L    Anion Gap 15 10 - 20 mmol/L    Urea Nitrogen 16 6 - 23 mg/dL    Creatinine 0.98 0.50 - 1.05 mg/dL    eGFR 64 >60 mL/min/1.73m*2    Calcium 8.6 8.6 - 10.3 mg/dL   Magnesium   Result Value Ref Range    Magnesium 2.17 1.60 - 2.40 mg/dL   POCT GLUCOSE   Result Value Ref Range    POCT Glucose 156 (H) 74 - 99 mg/dL   POCT GLUCOSE   Result Value Ref Range    POCT Glucose 175 (H) 74 - 99 mg/dL   POCT GLUCOSE   Result Value Ref Range    POCT Glucose 135 (H) 74 - 99 mg/dL          Radiology     XR abdomen 1 view  Result Date: 5/3/2025  Persistent gaseous distention of small-bowel loops, most likely due to ileus. Follow-up as needed.   Signed by: Frank Armendariz 5/3/2025 10:18 AM Dictation workstation:   [id number]    XR abdomen 1 view  Result Date: 5/2/2025  Dilated small bowel loop in the left abdomen measuring 3.7 cm in diameter may be secondary to ileus, however if there is persistent concern for acute abdominal pathology, CT is recommended for further evaluation.   MACRO: None   Signed by: Erik Morton 5/2/2025 8:45 PM Dictation workstation:   [id number]    CT brain attack head wo IV contrast  Result Date: 5/2/2025  No evidence of acute cortical infarct or intracranial hemorrhage. Volume loss. Degree does appear to be advanced for age. If persistent concern, consider MRI   MACRO: Anirudh Gabriel message by carolyne JALLOH on 5/2/2025 at 6:09 pm.  (**-RCF-**) Findings:  See findings.     Signed by: Anirudh Gabriel 5/2/2025 6:10 PM Dictation workstation:   [id number]    CT brain attack angio head and neck W and WO IV contrast  Result Date: 5/2/2025  1. CTA of Neck arteries demonstrates no significant flow-limiting stenosis or dissection. 2. CTA of  Intracranial arteries demonstrates no evidence for large vessel occlusion or other acute findings. 3. Multifocal mild atherosclerosis, without evidence for hemodynamically significant stenosis.   MACRO: None   Signed by: Ted Vera 5/2/2025 6:08 PM Dictation workstation:   [id number]       Assessment/Plan   Assessment & Plan  Toxic metabolic encephalopathy     Cystitis with hematuria     Hypovolemia dehydration     Severe sepsis with acute organ dysfunction (Multi)           PLAN: Pt was admitted on stepdown unit, was started on IVF,  monitor kidney function, urine cx were sent, c/w IV Zosyn, monitor lactate, swallow evaluation, ID consult, c/w other home medication, DVT prophylaxis, d/w nursing and CNP, follow closely.          Sushil Orozco MD          [1]   Family History  Problem Relation Name Age of Onset    Alzheimer's disease Mother      Thyroid disease Mother      Diabetes Father      Stroke Father      Diabetes Sister      Hypertension Sister     [2]   Current Facility-Administered Medications:     acetaminophen (Tylenol) tablet 650 mg, 650 mg, oral, q4h PRN, 650 mg at 05/03/25 0920 **OR** acetaminophen (Tylenol) oral liquid 650 mg, 650 mg, oral, q4h PRN **OR** acetaminophen (Tylenol) suppository 650 mg, 650 mg, rectal, q4h PRN, Lillian Ahumada APRN-CNP    amitriptyline (Elavil) tablet 10 mg, 10 mg, oral, Nightly, Lillian Ahumada, APRN-CNP    ammonium lactate (Lac-Hydrin) 12 % lotion, , Topical, BID, Lillian Ahumada APRN-CNP, Given at 05/03/25 0922    atorvastatin (Lipitor) tablet 10 mg, 10 mg, oral, Nightly, Lillain Ahumada APRN-CNP, 10 mg at 05/02/25 2245    bisacodyl (Dulcolax) EC tablet 5 mg, 5 mg, oral, Nightly, Lillian Ahumada APRN-CNP    bisacodyl (Dulcolax) suppository 10 mg, 10 mg, rectal, Daily, aSvita Mckeon, APRN-CNP, 10 mg at 05/03/25 1125    busPIRone (Buspar) tablet 10 mg, 10 mg, oral, BID, Lillian Ahumada APRN-CNP, 10 mg at 05/03/25 0922    dextrose 50 %  injection 12.5 g, 12.5 g, intravenous, q15 min PRN, PEARL Lara-CNP    dextrose 50 % injection 25 g, 25 g, intravenous, q15 min PRN, ANGELINA Lara    [Held by provider] dicyclomine (Bentyl) capsule 20 mg, 20 mg, oral, Before meals & nightly, ANGELINA Lara    [Held by provider] empagliflozin (Jardiance) tablet 25 mg, 25 mg, oral, Daily, ANGELINA Lara    enoxaparin (Lovenox) syringe 40 mg, 40 mg, subcutaneous, q24h, ANGELINA Lara, 40 mg at 05/03/25 0922    famotidine (Pepcid) tablet 20 mg, 20 mg, oral, BID, ANGELINA Lara, 20 mg at 05/03/25 0922    glimepiride (Amaryl) tablet 4 mg, 4 mg, oral, BID, PEARL Lara-CNP, 4 mg at 05/03/25 0922    glucagon (Glucagen) injection 1 mg, 1 mg, intramuscular, q15 min PRN, ANGELINA Lara    glucagon (Glucagen) injection 1 mg, 1 mg, intramuscular, q15 min PRN, ANGELINA Lara    glycerin (Adult) 2 g suppository 1 suppository, 1 suppository, rectal, Daily PRN, ANGELINA Lara    insulin glargine (Lantus) injection 10 Units, 10 Units, subcutaneous, Daily, ANGELINA Lara, 10 Units at 05/03/25 0925    insulin lispro injection 0-5 Units, 0-5 Units, subcutaneous, Before meals & nightly, ANGELINA Lara, 1 Units at 05/03/25 1317    lactated Ringer's infusion, 100 mL/hr, intravenous, Continuous, ANGELINA Lara, Last Rate: 100 mL/hr at 05/02/25 2304, 100 mL/hr at 05/02/25 2304    lactobacillus acidophilus tablet 1 tablet, 1 tablet, oral, BID, ANGELINA Lara, 1 tablet at 05/03/25 0922    levothyroxine (Synthroid, Levoxyl) tablet 75 mcg, 75 mcg, oral, Daily, ANGELINA Lara, 75 mcg at 05/03/25 0542    [Held by provider] lisinopril tablet 20 mg, 20 mg, oral, Daily, ANGELINA Lara, 20 mg at 05/03/25 0922    melatonin tablet 3 mg, 3 mg, oral, Nightly PRN, ANGELINA Lara     metoprolol succinate XL (Toprol-XL) 24 hr tablet 25 mg, 25 mg, oral, Daily, PEARL Lara-CNP, 25 mg at 05/03/25 0922    moisturizing mouth (Biotene Oral Dry Mouth) solution 1 spray, 1 spray, oral, Before meals & nightly, PEARL Lara-CNP, 1 spray at 05/03/25 1100    moisturizing mouth (Biotene Oral Dry Mouth) solution 1 spray, 1 spray, oral, PRN, Lillian Ahumada APRN-CNP    nystatin (Mycostatin) 100,000 unit/gram powder 1 Application, 1 Application, Topical, BID, PEARL Lara-CNP, 1 Application at 05/03/25 0922    ondansetron ODT (Zofran-ODT) disintegrating tablet 4 mg, 4 mg, oral, q8h PRN **OR** ondansetron (Zofran) injection 4 mg, 4 mg, intravenous, q8h PRN, PEARL Lara-CNP    piperacillin-tazobactam (Zosyn) 3.375 g in dextrose (iso) IV 50 mL, 3.375 g, intravenous, q8h, PEARL Lara-CNP, Last Rate: 0 mL/hr at 05/03/25 1037, 3.375 g at 05/03/25 1507    polyethylene glycol (Glycolax, Miralax) packet 17 g, 17 g, oral, Daily, PEARL Lara-CNP, 17 g at 05/03/25 0921    pregabalin (Lyrica) capsule 50 mg, 50 mg, oral, BID, PEARL Lara-CNP, 50 mg at 05/03/25 0922    sodium chloride 0.9% flush 10 mL, 10 mL, intravenous, q8h BAILEE, PEARL Lara-CNP, 10 mL at 05/03/25 1525    vancomycin (Vancocin) 1,000 mg in dextrose 5%  mL, 1,000 mg, intravenous, q12h, ANGELINA Verduzco    vancomycin (Vancocin) pharmacy to dose - pharmacy monitoring, , miscellaneous, Daily PRN, Savita Mckeon, PEARL-CNP    venlafaxine (Effexor) tablet 50 mg, 50 mg, oral, Nightly, ANGELINA Lara    white petrolatum (Aquaphor) ointment, , Topical, q1h PRN, Lillian Ahumada, PEARL-CNP

## 2025-05-03 NOTE — CONSULTS
Inpatient consult to Acute Care Surgery  Consult performed by: ANGELINA Glover  Consult ordered by: ANGELINA Verduzco  Reason for consult: ileus        History Of Present Illness  Lisa Vu is a 64 y.o. female presenting with toxic metabolic encephalopathy. She has history significant for dementia, St II CKD, chronic neuropathy leading to frequent falls, insulin dependent type 2 DM, irritable bowel syndrome with chronic constipation and recurrent UTI's. She is being referred for ileus.    She comes from a SNF and admits to avoiding ambulation due to her frequent falls. Her bowel regimen consists of Miralax daily. She reports flatus.     Past Medical History  Medical History[1]     Surgical History  Surgical History[2]      Social History  Social Drivers of Health     Tobacco Use: Low Risk  (5/2/2025)    Patient History     Smoking Tobacco Use: Never     Smokeless Tobacco Use: Never     Passive Exposure: Not on file   Alcohol Use: Not At Risk (5/2/2025)    AUDIT-C     Frequency of Alcohol Consumption: Never     Average Number of Drinks: Patient does not drink     Frequency of Binge Drinking: Never   Financial Resource Strain: Low Risk  (5/2/2025)    Overall Financial Resource Strain (CARDIA)     Difficulty of Paying Living Expenses: Not hard at all   Food Insecurity: No Food Insecurity (5/2/2025)    Hunger Vital Sign     Worried About Running Out of Food in the Last Year: Never true     Ran Out of Food in the Last Year: Never true   Transportation Needs: No Transportation Needs (5/2/2025)    PRAPARE - Transportation     Lack of Transportation (Medical): No     Lack of Transportation (Non-Medical): No   Physical Activity: Inactive (5/2/2025)    Exercise Vital Sign     Days of Exercise per Week: 0 days     Minutes of Exercise per Session: 0 min   Stress: Not on file   Social Connections: Not on file   Intimate Partner Violence: Not At Risk (5/2/2025)    Humiliation, Afraid, Rape, and Kick  questionnaire     Fear of Current or Ex-Partner: No     Emotionally Abused: No     Physically Abused: No     Sexually Abused: No   Depression: Not at risk (5/2/2025)    PHQ-2     PHQ-2 Score: 0   Housing Stability: Low Risk  (5/2/2025)    Housing Stability Vital Sign     Unable to Pay for Housing in the Last Year: No     Number of Times Moved in the Last Year: 1     Homeless in the Last Year: No   Utilities: Not At Risk (5/2/2025)    [facility] Utilities     Threatened with loss of utilities: No   Digital Equity: Not on file   Health Literacy: Not on file        Family History  Family History[3]     Home Medications  Prior to Admission medications    Medication Sig Start Date End Date Taking? Authorizing Provider   amitriptyline (Elavil) 10 mg tablet Take 1 tablet (10 mg) by mouth once daily at bedtime.   Yes Historical Provider, MD   atorvastatin (Lipitor) 10 mg tablet Take 1 tablet (10 mg) by mouth once daily at bedtime.   Yes Historical Provider, MD   busPIRone (Buspar) 10 mg tablet Take 1 tablet (10 mg) by mouth 2 times a day.   Yes Historical Provider, MD   dicyclomine (Bentyl) 20 mg tablet Take 1 tablet (20 mg) by mouth 4 times a day before meals.   Yes Historical Provider, MD   empagliflozin (Jardiance) 25 mg tablet Take 1 tablet (25 mg) by mouth once daily.   Yes Historical Provider, MD   glimepiride (Amaryl) 4 mg tablet Take 1 tablet (4 mg) by mouth 2 times a day.   Yes Historical Provider, MD   insulin glargine (Lantus U-100 Insulin) 100 unit/mL injection Inject 10 Units under the skin once daily. Take as directed per insulin instructions.   Yes Historical Provider, MD   insulin lispro (HumaLOG KwikPen Insulin) 100 unit/mL pen Inject 0-5 Units under the skin 3 times daily (morning, midday, late afternoon). per sliding scale:  201-250 = 2 units, 251-300 = 3 units, 301-350 = 4 units, 351-400 = 5 units, >400 = call MD   Yes Historical Provider, MD   levothyroxine (Synthroid, Levoxyl) 75 mcg tablet Take 1 tablet (75  mcg) by mouth early in the morning.. Take on an empty stomach at the same time each day, either 30 to 60 minutes prior to breakfast   Yes Historical Provider, MD   lisinopril 20 mg tablet Take 1 tablet (20 mg) by mouth once daily.   Yes Historical Provider, MD   metoprolol succinate XL (Toprol-XL) 25 mg 24 hr tablet Take 1 tablet (25 mg) by mouth once daily. Do not crush or chew.   Yes Historical Provider, MD   nystatin (Mycostatin) 100,000 unit/gram powder Apply 1 Application topically 2 times a day. Apply under bilateral breasts, for excoriation. discontinue when resolved   Yes Historical Provider, MD   nystatin (Mycostatin) 100,000 unit/gram powder Apply 1 Application topically 2 times a day. Apply to buttocks and kelsi area. Also use PRN for any redness or excoriation   Yes Historical Provider, MD   polyethylene glycol (Glycolax, Miralax) 17 gram packet Take 17 g by mouth once daily.   Yes Historical Provider, MD   pregabalin (Lyrica) 50 mg capsule Take 1 capsule (50 mg) by mouth 2 times a day.   Yes Historical Provider, MD   venlafaxine (Effexor) 50 mg tablet Take 1 tablet (50 mg) by mouth once daily at bedtime.   Yes Historical Provider, MD   wound dressing (Triad Wound Dressing) paste Apply 1 Application topically 2 times a day. Apply to groin, discontinue when resolved   Yes Historical Provider, MD   acetaminophen (Tylenol) 325 mg tablet Take 2 tablets (650 mg) by mouth every 4 hours if needed for mild pain (1 - 3) or fever (temp greater than 38.0 C).    Historical Provider, MD   acetaminophen (Tylenol) 650 mg suppository Insert 1 suppository (650 mg) into the rectum every 4 hours if needed for mild pain (1 - 3) or fever (temp greater than 38.0 C).    Historical Provider, MD   alum-mag hydroxide-simeth (Mylanta) 200-200-20 mg/5 mL oral suspension Take 30 mL by mouth every 4 hours if needed for indigestion or heartburn.    Historical Provider, MD   bisacodyl (Dulcolax, bisacodyl,) 10 mg suppository Insert 1  suppository (10 mg) into the rectum once daily as needed for constipation.    Brooke Provider, MD   docusate sodium (Colace) 100 mg capsule Take 1 capsule (100 mg) by mouth 2 times a day as needed for constipation.    Brooke Provider, MD   famotidine (Pepcid) 20 mg tablet Take 1 tablet (20 mg) by mouth once daily as needed for indigestion.    Brooke Suarez MD   glucagon HCL (Glucagon, HCl, Emergency Kit) 1 mg recon soln Inject 1 mg as directed once daily as needed (low blood sugar).    Brooke Provider, MD   glycerin (Adult) 2 g suppository Insert 1 suppository into the rectum once daily as needed for constipation.    Brooke Provider, MD   guaiFENesin (Robitussin) 100 mg/5 mL syrup Take 10 mL (200 mg) by mouth every 4 hours if needed for cough.    Brooke Provider, MD   loperamide (Imodium) 2 mg capsule Take 1 capsule (2 mg) by mouth 4 times a day as needed for diarrhea.    Brooke Provider, MD   magnesium hydroxide (Milk of Magnesia) 400 mg/5 mL suspension Take 30 mL by mouth once daily as needed for constipation.    Historical Provider, MD   metoclopramide (Reglan) 10 mg tablet Take 1 tablet (10 mg) by mouth every 4 hours if needed (peptic ulcer).    Brooke Provider, MD   ondansetron (Zofran) 4 mg tablet Take 1 tablet (4 mg) by mouth every 8 hours if needed for nausea or vomiting.    Brooke Provider, MD   polyethylene glycol (Glycolax, Miralax) 17 gram packet Take 17 g by mouth if needed.    Brooke Suarez MD   semaglutide (Ozempic) 0.25 mg or 0.5 mg(2 mg/1.5 mL) pen injector Inject 1 mg under the skin 1 (one) time per week.    Historical Provider, MD   sodium phosphates (Fleet Enema) 19-7 gram/118 mL enema enema Insert 133 mL (1 enema) into the rectum once daily as needed for constipation.    Brooke Provider, MD   wound dressing (Triad Wound Dressing) paste Apply 1 Application topically once daily as needed. Apply to groin area    Brooke Suarez MD     "    Allergies  Codeine and Metformin    Review of Systems  Review of Systems   Constitutional:  Negative for activity change and appetite change.   Respiratory:  Negative for apnea, cough, chest tightness and shortness of breath.    Cardiovascular:  Negative for chest pain.   Gastrointestinal:  Positive for constipation. Negative for abdominal distention, abdominal pain and vomiting.   Genitourinary:  Negative for dysuria.   Musculoskeletal:  Positive for gait problem.   Psychiatric/Behavioral:  Negative for agitation.         Physical Exam  Physical Exam  Constitutional:       General: She is not in acute distress.     Appearance: She is obese. She is not toxic-appearing.   Cardiovascular:      Rate and Rhythm: Normal rate and regular rhythm.   Pulmonary:      Effort: Pulmonary effort is normal.   Abdominal:      General: There is no distension.      Palpations: Abdomen is soft.      Tenderness: There is no abdominal tenderness.   Genitourinary:     Comments: On exam, rectal vault with soft stools  Skin:     General: Skin is warm and dry.   Neurological:      Mental Status: Mental status is at baseline.      Motor: Weakness present.      Gait: Gait abnormal.   Psychiatric:         Mood and Affect: Mood normal.          Medications  Scheduled medications  Scheduled Medications[4]  Continuous medications  Continuous Medications[5]  PRN medications  PRN Medications[6]     Last Recorded Vitals  Heart Rate:  []   Temp:  [35.5 °C (95.9 °F)-37.4 °C (99.3 °F)]   Resp:  [14-31]   BP: ()/(45-83)   Height:  [170.2 cm (5' 7\")-172.7 cm (5' 8\")]   Weight:  [91 kg (200 lb 9.9 oz)-96.2 kg (212 lb 1.3 oz)]   SpO2:  [93 %-100 %]      Input/Output    Intake/Output Summary (Last 24 hours) at 5/3/2025 1323  Last data filed at 5/3/2025 0200  Gross per 24 hour   Intake 1550 ml   Output 200 ml   Net 1350 ml        Labs  Results for orders placed or performed during the hospital encounter of 05/02/25 (from the past 24 hours) "   POCT GLUCOSE   Result Value Ref Range    POCT Glucose 222 (H) 74 - 99 mg/dL   CBC and Auto Differential   Result Value Ref Range    WBC 18.3 (H) 4.4 - 11.3 x10*3/uL    nRBC 0.0 0.0 - 0.0 /100 WBCs    RBC 4.72 4.00 - 5.20 x10*6/uL    Hemoglobin 13.2 12.0 - 16.0 g/dL    Hematocrit 40.7 36.0 - 46.0 %    MCV 86 80 - 100 fL    MCH 28.0 26.0 - 34.0 pg    MCHC 32.4 32.0 - 36.0 g/dL    RDW 15.2 (H) 11.5 - 14.5 %    Platelets 314 150 - 450 x10*3/uL    Neutrophils % 89.5 40.0 - 80.0 %    Immature Granulocytes %, Automated 0.9 0.0 - 0.9 %    Lymphocytes % 3.5 13.0 - 44.0 %    Monocytes % 5.7 2.0 - 10.0 %    Eosinophils % 0.1 0.0 - 6.0 %    Basophils % 0.3 0.0 - 2.0 %    Neutrophils Absolute 16.39 (H) 1.20 - 7.70 x10*3/uL    Immature Granulocytes Absolute, Automated 0.16 0.00 - 0.70 x10*3/uL    Lymphocytes Absolute 0.65 (L) 1.20 - 4.80 x10*3/uL    Monocytes Absolute 1.05 (H) 0.10 - 1.00 x10*3/uL    Eosinophils Absolute 0.02 0.00 - 0.70 x10*3/uL    Basophils Absolute 0.05 0.00 - 0.10 x10*3/uL   Comprehensive metabolic panel   Result Value Ref Range    Glucose 223 (H) 74 - 99 mg/dL    Sodium 137 136 - 145 mmol/L    Potassium 3.7 3.5 - 5.3 mmol/L    Chloride 101 98 - 107 mmol/L    Bicarbonate 24 21 - 32 mmol/L    Anion Gap 16 10 - 20 mmol/L    Urea Nitrogen 15 6 - 23 mg/dL    Creatinine 0.96 0.50 - 1.05 mg/dL    eGFR 66 >60 mL/min/1.73m*2    Calcium 8.5 (L) 8.6 - 10.3 mg/dL    Albumin 3.2 (L) 3.4 - 5.0 g/dL    Alkaline Phosphatase 70 33 - 136 U/L    Total Protein 6.7 6.4 - 8.2 g/dL    AST 13 9 - 39 U/L    Bilirubin, Total 0.7 0.0 - 1.2 mg/dL    ALT 9 7 - 45 U/L   Troponin I, High Sensitivity   Result Value Ref Range    Troponin I, High Sensitivity 6 0 - 13 ng/L   Protime-INR   Result Value Ref Range    Protime 12.2 9.8 - 12.4 seconds    INR 1.1 0.9 - 1.1   APTT   Result Value Ref Range    aPTT 27 26 - 36 seconds   Lactate   Result Value Ref Range    Lactate 2.5 (H) 0.4 - 2.0 mmol/L   Blood Culture    Specimen: Peripheral  Venipuncture; Blood culture   Result Value Ref Range    Blood Culture Loaded on Instrument - Culture in progress    TSH with reflex to Free T4 if abnormal   Result Value Ref Range    Thyroid Stimulating Hormone 2.82 0.44 - 3.98 mIU/L   Magnesium   Result Value Ref Range    Magnesium 1.51 (L) 1.60 - 2.40 mg/dL   Phosphorus   Result Value Ref Range    Phosphorus 3.0 2.5 - 4.9 mg/dL   Blood Culture    Specimen: Peripheral Venipuncture; Blood culture   Result Value Ref Range    Blood Culture Loaded on Instrument - Culture in progress    Urinalysis with Reflex Culture and Microscopic   Result Value Ref Range    Color, Urine Yellow Straw, Yellow    Appearance, Urine Cloudy (N) Clear    Specific Gravity, Urine 1.015 1.005 - 1.035    pH, Urine 5.0 5.0, 5.5, 6.0, 6.5, 7.0, 7.5, 8.0    Protein, Urine 30 (1+) (A) NEGATIVE, 10 (TRACE) mg/dL    Glucose, Urine 1000 (4+) (A) NEGATIVE mg/dL    Blood, Urine 0.2 (2+) (A) NEGATIVE mg/dL    Ketones, Urine 10 (1+) (A) NEGATIVE mg/dL    Bilirubin, Urine NEGATIVE NEGATIVE mg/dL    Urobilinogen, Urine NORM NORM mg/dL    Nitrite, Urine NEGATIVE NEGATIVE    Leukocyte Esterase, Urine 500 Robert/uL (A) NEGATIVE   Extra Urine Gray Tube   Result Value Ref Range    Extra Tube Hold for add-ons.    Microscopic Only, Urine   Result Value Ref Range    WBC, Urine >50 (A) 1-5, NONE /HPF    WBC Clumps, Urine MANY Reference range not established. /HPF    RBC, Urine >20 (A) NONE, 1-2, 3-5 /HPF    Bacteria, Urine 4+ (A) NONE SEEN /HPF    Mucus, Urine 1+ Reference range not established. /LPF   Lactate   Result Value Ref Range    Lactate 2.7 (H) 0.4 - 2.0 mmol/L   POCT GLUCOSE   Result Value Ref Range    POCT Glucose 222 (H) 74 - 99 mg/dL   Coagulation Screen   Result Value Ref Range    Protime 12.5 (H) 9.8 - 12.4 seconds    INR 1.1 0.9 - 1.1    aPTT 29 26 - 36 seconds   Lactate   Result Value Ref Range    Lactate 1.9 0.4 - 2.0 mmol/L   CBC   Result Value Ref Range    WBC 17.7 (H) 4.4 - 11.3 x10*3/uL    nRBC  0.0 0.0 - 0.0 /100 WBCs    RBC 4.33 4.00 - 5.20 x10*6/uL    Hemoglobin 11.9 (L) 12.0 - 16.0 g/dL    Hematocrit 39.3 36.0 - 46.0 %    MCV 91 80 - 100 fL    MCH 27.5 26.0 - 34.0 pg    MCHC 30.3 (L) 32.0 - 36.0 g/dL    RDW 15.7 (H) 11.5 - 14.5 %    Platelets 244 150 - 450 x10*3/uL   Basic metabolic panel   Result Value Ref Range    Glucose 192 (H) 74 - 99 mg/dL    Sodium 136 136 - 145 mmol/L    Potassium 4.2 3.5 - 5.3 mmol/L    Chloride 102 98 - 107 mmol/L    Bicarbonate 23 21 - 32 mmol/L    Anion Gap 15 10 - 20 mmol/L    Urea Nitrogen 16 6 - 23 mg/dL    Creatinine 0.98 0.50 - 1.05 mg/dL    eGFR 64 >60 mL/min/1.73m*2    Calcium 8.6 8.6 - 10.3 mg/dL   Magnesium   Result Value Ref Range    Magnesium 2.17 1.60 - 2.40 mg/dL   POCT GLUCOSE   Result Value Ref Range    POCT Glucose 156 (H) 74 - 99 mg/dL   POCT GLUCOSE   Result Value Ref Range    POCT Glucose 175 (H) 74 - 99 mg/dL       Imaging  Imaging  XR abdomen 1 view  Result Date: 5/3/2025  Persistent gaseous distention of small-bowel loops, most likely due to ileus. Follow-up as needed.   Signed by: Frank Armendariz 5/3/2025 10:18 AM Dictation workstation:   [id number]    XR abdomen 1 view  Result Date: 5/2/2025  Dilated small bowel loop in the left abdomen measuring 3.7 cm in diameter may be secondary to ileus, however if there is persistent concern for acute abdominal pathology, CT is recommended for further evaluation.   MACRO: None   Signed by: Erik Morton 5/2/2025 8:45 PM Dictation workstation:   [id number]    CT brain attack head wo IV contrast  Result Date: 5/2/2025  No evidence of acute cortical infarct or intracranial hemorrhage. Volume loss. Degree does appear to be advanced for age. If persistent concern, consider MRI   MACRO: Anirudh Gabriel message by epic leti JALLOH on 5/2/2025 at 6:09 pm.  (**-RCF-**) Findings:  See findings.     Signed by: Anirudh Gabriel 5/2/2025 6:10 PM Dictation workstation:   [id number]    CT brain attack angio head and neck W  and WO IV contrast  Result Date: 5/2/2025  1. CTA of Neck arteries demonstrates no significant flow-limiting stenosis or dissection. 2. CTA of Intracranial arteries demonstrates no evidence for large vessel occlusion or other acute findings. 3. Multifocal mild atherosclerosis, without evidence for hemodynamically significant stenosis.   MACRO: None   Signed by: Ted Vera 5/2/2025 6:08 PM Dictation workstation:   [id number]      Cardiology, Vascular, and Other Imaging  No other imaging results found for the past 7 days    Plan  Toxic metabolic encephalopathy    Urinary Tract Infection    Urine culture  Continue antibiotics while waiting for results of culture.    Ileus    Associated with UTI - should resolve once infection responds to antibiotic treatment.  Will recommend bowel regimen at discharge once current problem resolves.   Will follow.    I spent 35 minutes in the professional and overall care of this patient.     Plan of care discussed and patient seen with Dr KENIA Barbosa.    Aura Dukes, PEARL-CNP        [1]   Past Medical History:  Diagnosis Date    Ambulatory dysfunction     Anxiety and depression     Asthma     Carpal tunnel syndrome     Cerebral atherosclerosis     Chronic back pain     Chronic kidney disease, stage II (mild)     Chronic nausea     COVID-19 vaccine administered     Dementia     Frequent falls     Frequent patient in emergency department     Gastroesophageal reflux disease     History of rib fracture     Hyperlipidemia     Hypertension     Hypothyroidism     IgA monoclonal gammopathy of uncertain significance     Insulin dependent type 2 diabetes mellitus (Multi)     Irritable bowel syndrome with predominant constipation     Lumbar spondylosis     Obstructive sleep apnea     Osteoarthritis     Partial thickness rotator cuff tear     Peptic ulcer disease     Recurrent urinary tract infection     Restless leg syndrome     Type 2 diabetes mellitus with peripheral neuropathy      Vitamin D deficiency    [2]   Past Surgical History:  Procedure Laterality Date    CERVICAL BIOPSY      CHOLECYSTECTOMY      COLONOSCOPY      COLPOSCOPY      ENDOMETRIAL BIOPSY      ESOPHAGOGASTRODUODENOSCOPY      FLEXIBLE SIGMOIDOSCOPY      GASTROPLASTY      RADIOFREQUENCY ABLATION      SALIVARY GLAND SURGERY     [3]   Family History  Problem Relation Name Age of Onset    Alzheimer's disease Mother      Thyroid disease Mother      Diabetes Father      Stroke Father      Diabetes Sister      Hypertension Sister     [4] amitriptyline, 10 mg, oral, Nightly  ammonium lactate, , Topical, BID  atorvastatin, 10 mg, oral, Nightly  bisacodyl, 5 mg, oral, Nightly  bisacodyl, 10 mg, rectal, Daily  busPIRone, 10 mg, oral, BID  [Held by provider] dicyclomine, 20 mg, oral, Before meals & nightly  [Held by provider] empagliflozin, 25 mg, oral, Daily  enoxaparin, 40 mg, subcutaneous, q24h  famotidine, 20 mg, oral, BID  glimepiride, 4 mg, oral, BID  insulin glargine, 10 Units, subcutaneous, Daily  insulin lispro, 0-5 Units, subcutaneous, Before meals & nightly  lactated Ringer's, 1,000 mL, intravenous, Once  lactobacillus acidophilus, 1 tablet, oral, BID  levothyroxine, 75 mcg, oral, Daily  [Held by provider] lisinopril, 20 mg, oral, Daily  metoprolol succinate XL, 25 mg, oral, Daily  moisturizing mouth, 1 spray, oral, Before meals & nightly  nystatin, 1 Application, Topical, BID  piperacillin-tazobactam, 3.375 g, intravenous, q8h  polyethylene glycol, 17 g, oral, Daily  pregabalin, 50 mg, oral, BID  sodium chloride 0.9%, 10 mL, intravenous, q8h BAILEE  venlafaxine, 50 mg, oral, Nightly  [5] lactated Ringer's, 100 mL/hr, Last Rate: 100 mL/hr (05/02/25 2304)  [6] PRN medications: acetaminophen **OR** acetaminophen **OR** acetaminophen, dextrose, dextrose, glucagon, glucagon, glycerin, melatonin, moisturizing mouth, ondansetron ODT **OR** ondansetron, white petrolatum

## 2025-05-03 NOTE — CARE PLAN
Pt admitted from ED. Mag and albumin administered per order, LR continuously per order, ATB administered per order. She remained afebrile this shift, mentation improved throughout shift, VS stable. She remained safe, call light in reach, Plan of care ongoing.       Problem: Safety - Adult  Goal: Free from fall injury  Outcome: Progressing     Problem: Fall/Injury  Goal: Not fall by end of shift  Outcome: Progressing     Problem: Skin  Goal: Prevent/minimize sheer/friction injuries  Outcome: Progressing     Problem: Skin  Goal: Prevent/manage excess moisture  Outcome: Progressing     Problem: Infection prevention/bleeding  Goal: Infection s/sx managed  Outcome: Progressing

## 2025-05-03 NOTE — ASSESSMENT & PLAN NOTE
Lactate has normalized, continue gentle IV fluid, trend white blood cell count, telemetry monitoring, watch for fever, blood cultures pending

## 2025-05-04 ENCOUNTER — APPOINTMENT (OUTPATIENT)
Dept: RADIOLOGY | Facility: HOSPITAL | Age: 65
DRG: 871 | End: 2025-05-04
Payer: COMMERCIAL

## 2025-05-04 PROBLEM — N13.30 HYDRONEPHROSIS: Status: ACTIVE | Noted: 2025-05-04

## 2025-05-04 PROBLEM — B49 FUNGEMIA: Status: ACTIVE | Noted: 2025-05-04

## 2025-05-04 PROBLEM — N13.30 HYDROURETERONEPHROSIS: Status: ACTIVE | Noted: 2025-05-04

## 2025-05-04 LAB
ANION GAP SERPL CALC-SCNC: 10 MMOL/L (ref 10–20)
APPEARANCE UR: ABNORMAL
BACTERIA BLD AEROBE CULT: ABNORMAL
BACTERIA BLD CULT: ABNORMAL
BACTERIA UR CULT: NORMAL
BILIRUB UR STRIP.AUTO-MCNC: NEGATIVE MG/DL
BUN SERPL-MCNC: 18 MG/DL (ref 6–23)
CALCIUM SERPL-MCNC: 8.2 MG/DL (ref 8.6–10.3)
CHLORIDE SERPL-SCNC: 105 MMOL/L (ref 98–107)
CO2 SERPL-SCNC: 27 MMOL/L (ref 21–32)
COLOR UR: YELLOW
CREAT SERPL-MCNC: 0.86 MG/DL (ref 0.5–1.05)
EGFRCR SERPLBLD CKD-EPI 2021: 75 ML/MIN/1.73M*2
ERYTHROCYTE [DISTWIDTH] IN BLOOD BY AUTOMATED COUNT: 15.8 % (ref 11.5–14.5)
GLUCOSE BLD MANUAL STRIP-MCNC: 120 MG/DL (ref 74–99)
GLUCOSE BLD MANUAL STRIP-MCNC: 130 MG/DL (ref 74–99)
GLUCOSE BLD MANUAL STRIP-MCNC: 135 MG/DL (ref 74–99)
GLUCOSE BLD MANUAL STRIP-MCNC: 84 MG/DL (ref 74–99)
GLUCOSE SERPL-MCNC: 54 MG/DL (ref 74–99)
GLUCOSE UR STRIP.AUTO-MCNC: ABNORMAL MG/DL
GRAM STN SPEC: ABNORMAL
HCT VFR BLD AUTO: 36 % (ref 36–46)
HGB BLD-MCNC: 11 G/DL (ref 12–16)
KETONES UR STRIP.AUTO-MCNC: NEGATIVE MG/DL
LEUKOCYTE ESTERASE UR QL STRIP.AUTO: ABNORMAL
MAGNESIUM SERPL-MCNC: 2.02 MG/DL (ref 1.6–2.4)
MCH RBC QN AUTO: 27.3 PG (ref 26–34)
MCHC RBC AUTO-ENTMCNC: 30.6 G/DL (ref 32–36)
MCV RBC AUTO: 89 FL (ref 80–100)
MUCOUS THREADS #/AREA URNS AUTO: ABNORMAL /LPF
NITRITE UR QL STRIP.AUTO: NEGATIVE
NRBC BLD-RTO: 0 /100 WBCS (ref 0–0)
PH UR STRIP.AUTO: 5.5 [PH]
PLATELET # BLD AUTO: 217 X10*3/UL (ref 150–450)
POTASSIUM SERPL-SCNC: 3.3 MMOL/L (ref 3.5–5.3)
PROT UR STRIP.AUTO-MCNC: NEGATIVE MG/DL
RBC # BLD AUTO: 4.03 X10*6/UL (ref 4–5.2)
RBC # UR STRIP.AUTO: ABNORMAL MG/DL
RBC #/AREA URNS AUTO: >20 /HPF
SODIUM SERPL-SCNC: 139 MMOL/L (ref 136–145)
SP GR UR STRIP.AUTO: 1.01
STAPHYLOCOCCUS SPEC CULT: ABNORMAL
UROBILINOGEN UR STRIP.AUTO-MCNC: ABNORMAL MG/DL
VANCOMYCIN SERPL-MCNC: 11.8 UG/ML (ref 5–20)
WBC # BLD AUTO: 8.8 X10*3/UL (ref 4.4–11.3)
WBC #/AREA URNS AUTO: >50 /HPF
WBC CLUMPS #/AREA URNS AUTO: ABNORMAL /HPF
YEAST BUDDING #/AREA UR COMP ASSIST: PRESENT /HPF

## 2025-05-04 PROCEDURE — 74018 RADEX ABDOMEN 1 VIEW: CPT | Performed by: RADIOLOGY

## 2025-05-04 PROCEDURE — 87086 URINE CULTURE/COLONY COUNT: CPT | Mod: STJLAB | Performed by: NURSE PRACTITIONER

## 2025-05-04 PROCEDURE — 2500000001 HC RX 250 WO HCPCS SELF ADMINISTERED DRUGS (ALT 637 FOR MEDICARE OP): Performed by: NURSE PRACTITIONER

## 2025-05-04 PROCEDURE — 81001 URINALYSIS AUTO W/SCOPE: CPT | Performed by: NURSE PRACTITIONER

## 2025-05-04 PROCEDURE — 36415 COLL VENOUS BLD VENIPUNCTURE: CPT | Performed by: NURSE PRACTITIONER

## 2025-05-04 PROCEDURE — 2500000004 HC RX 250 GENERAL PHARMACY W/ HCPCS (ALT 636 FOR OP/ED): Mod: JZ | Performed by: NURSE PRACTITIONER

## 2025-05-04 PROCEDURE — 74177 CT ABD & PELVIS W/CONTRAST: CPT

## 2025-05-04 PROCEDURE — 92610 EVALUATE SWALLOWING FUNCTION: CPT | Mod: GN

## 2025-05-04 PROCEDURE — 85027 COMPLETE CBC AUTOMATED: CPT | Performed by: NURSE PRACTITIONER

## 2025-05-04 PROCEDURE — 83735 ASSAY OF MAGNESIUM: CPT | Performed by: NURSE PRACTITIONER

## 2025-05-04 PROCEDURE — 74018 RADEX ABDOMEN 1 VIEW: CPT

## 2025-05-04 PROCEDURE — 74177 CT ABD & PELVIS W/CONTRAST: CPT | Performed by: STUDENT IN AN ORGANIZED HEALTH CARE EDUCATION/TRAINING PROGRAM

## 2025-05-04 PROCEDURE — 80202 ASSAY OF VANCOMYCIN: CPT | Performed by: NURSE PRACTITIONER

## 2025-05-04 PROCEDURE — 2500000002 HC RX 250 W HCPCS SELF ADMINISTERED DRUGS (ALT 637 FOR MEDICARE OP, ALT 636 FOR OP/ED): Performed by: NURSE PRACTITIONER

## 2025-05-04 PROCEDURE — 80048 BASIC METABOLIC PNL TOTAL CA: CPT | Performed by: NURSE PRACTITIONER

## 2025-05-04 PROCEDURE — 2550000001 HC RX 255 CONTRASTS: Performed by: INTERNAL MEDICINE

## 2025-05-04 PROCEDURE — 82947 ASSAY GLUCOSE BLOOD QUANT: CPT

## 2025-05-04 PROCEDURE — 2060000001 HC INTERMEDIATE ICU ROOM DAILY

## 2025-05-04 RX ORDER — FLUCONAZOLE 2 MG/ML
400 INJECTION, SOLUTION INTRAVENOUS EVERY 24 HOURS
Status: DISPENSED | OUTPATIENT
Start: 2025-05-05

## 2025-05-04 RX ORDER — PANTOPRAZOLE SODIUM 40 MG/10ML
40 INJECTION, POWDER, LYOPHILIZED, FOR SOLUTION INTRAVENOUS DAILY
Status: DISPENSED | OUTPATIENT
Start: 2025-05-04

## 2025-05-04 RX ORDER — INSULIN LISPRO 100 [IU]/ML
0-5 INJECTION, SOLUTION INTRAVENOUS; SUBCUTANEOUS
Status: ACTIVE | OUTPATIENT
Start: 2025-05-05

## 2025-05-04 RX ORDER — FLUCONAZOLE 2 MG/ML
400 INJECTION, SOLUTION INTRAVENOUS
Status: COMPLETED | OUTPATIENT
Start: 2025-05-04 | End: 2025-05-04

## 2025-05-04 RX ORDER — POTASSIUM CHLORIDE 20 MEQ/1
40 TABLET, EXTENDED RELEASE ORAL ONCE
Status: COMPLETED | OUTPATIENT
Start: 2025-05-04 | End: 2025-05-04

## 2025-05-04 RX ADMIN — FLUCONAZOLE 400 MG: 2 INJECTION, SOLUTION INTRAVENOUS at 09:38

## 2025-05-04 RX ADMIN — PIPERACILLIN SODIUM AND TAZOBACTAM SODIUM 3.38 G: 3; .375 INJECTION, SOLUTION INTRAVENOUS at 09:38

## 2025-05-04 RX ADMIN — BUSPIRONE HYDROCHLORIDE 10 MG: 10 TABLET ORAL at 21:46

## 2025-05-04 RX ADMIN — POTASSIUM CHLORIDE 40 MEQ: 1500 TABLET, EXTENDED RELEASE ORAL at 09:38

## 2025-05-04 RX ADMIN — ENOXAPARIN SODIUM 40 MG: 40 INJECTION SUBCUTANEOUS at 09:39

## 2025-05-04 RX ADMIN — Medication 1 SPRAY: at 13:00

## 2025-05-04 RX ADMIN — FAMOTIDINE 20 MG: 20 TABLET, FILM COATED ORAL at 09:39

## 2025-05-04 RX ADMIN — Medication 1 SPRAY: at 16:28

## 2025-05-04 RX ADMIN — VANCOMYCIN HYDROCHLORIDE 1000 MG: 1 INJECTION, SOLUTION INTRAVENOUS at 17:47

## 2025-05-04 RX ADMIN — Medication 1 TABLET: at 21:46

## 2025-05-04 RX ADMIN — LEVOTHYROXINE SODIUM 75 MCG: 0.07 TABLET ORAL at 05:56

## 2025-05-04 RX ADMIN — Medication 1 SPRAY: at 06:56

## 2025-05-04 RX ADMIN — Medication 10 ML: at 21:57

## 2025-05-04 RX ADMIN — PIPERACILLIN SODIUM AND TAZOBACTAM SODIUM 3.38 G: 3; .375 INJECTION, SOLUTION INTRAVENOUS at 21:48

## 2025-05-04 RX ADMIN — PREGABALIN 50 MG: 50 CAPSULE ORAL at 09:39

## 2025-05-04 RX ADMIN — Medication 10 ML: at 06:17

## 2025-05-04 RX ADMIN — NYSTATIN 1 APPLICATION: 100000 POWDER TOPICAL at 21:48

## 2025-05-04 RX ADMIN — Medication 1 SPRAY: at 21:56

## 2025-05-04 RX ADMIN — PIPERACILLIN SODIUM AND TAZOBACTAM SODIUM 3.38 G: 3; .375 INJECTION, SOLUTION INTRAVENOUS at 15:08

## 2025-05-04 RX ADMIN — Medication 10 ML: at 15:10

## 2025-05-04 RX ADMIN — VANCOMYCIN HYDROCHLORIDE 1000 MG: 1 INJECTION, SOLUTION INTRAVENOUS at 06:56

## 2025-05-04 RX ADMIN — PANTOPRAZOLE SODIUM 40 MG: 40 INJECTION, POWDER, FOR SOLUTION INTRAVENOUS at 16:30

## 2025-05-04 RX ADMIN — AMITRIPTYLINE HYDROCHLORIDE 10 MG: 10 TABLET, FILM COATED ORAL at 21:45

## 2025-05-04 RX ADMIN — NYSTATIN 1 APPLICATION: 100000 POWDER TOPICAL at 09:40

## 2025-05-04 RX ADMIN — Medication: at 09:38

## 2025-05-04 RX ADMIN — PREGABALIN 50 MG: 50 CAPSULE ORAL at 21:46

## 2025-05-04 RX ADMIN — ACETAMINOPHEN 650 MG: 325 TABLET ORAL at 13:07

## 2025-05-04 RX ADMIN — VENLAFAXINE 50 MG: 25 TABLET ORAL at 21:46

## 2025-05-04 RX ADMIN — IOHEXOL 75 ML: 350 INJECTION, SOLUTION INTRAVENOUS at 11:18

## 2025-05-04 RX ADMIN — ATORVASTATIN CALCIUM 10 MG: 10 TABLET, FILM COATED ORAL at 21:46

## 2025-05-04 RX ADMIN — FLUCONAZOLE 400 MG: 2 INJECTION, SOLUTION INTRAVENOUS at 05:55

## 2025-05-04 RX ADMIN — BUSPIRONE HYDROCHLORIDE 10 MG: 10 TABLET ORAL at 09:38

## 2025-05-04 RX ADMIN — Medication: at 21:48

## 2025-05-04 RX ADMIN — Medication 1 TABLET: at 09:39

## 2025-05-04 ASSESSMENT — COGNITIVE AND FUNCTIONAL STATUS - GENERAL
EATING MEALS: A LITTLE
DRESSING REGULAR UPPER BODY CLOTHING: A LOT
MOVING TO AND FROM BED TO CHAIR: TOTAL
PERSONAL GROOMING: A LOT
MOBILITY SCORE: 7
WALKING IN HOSPITAL ROOM: TOTAL
DAILY ACTIVITIY SCORE: 11
TURNING FROM BACK TO SIDE WHILE IN FLAT BAD: TOTAL
MOVING FROM LYING ON BACK TO SITTING ON SIDE OF FLAT BED WITH BEDRAILS: A LOT
STANDING UP FROM CHAIR USING ARMS: TOTAL
DRESSING REGULAR LOWER BODY CLOTHING: A LOT
CLIMB 3 TO 5 STEPS WITH RAILING: TOTAL
TOILETING: TOTAL
HELP NEEDED FOR BATHING: TOTAL

## 2025-05-04 ASSESSMENT — PAIN SCALES - GENERAL
PAINLEVEL_OUTOF10: 0 - NO PAIN
PAINLEVEL_OUTOF10: 3
PAINLEVEL_OUTOF10: 0 - NO PAIN

## 2025-05-04 ASSESSMENT — PAIN - FUNCTIONAL ASSESSMENT
PAIN_FUNCTIONAL_ASSESSMENT: 0-10

## 2025-05-04 ASSESSMENT — PAIN DESCRIPTION - DESCRIPTORS: DESCRIPTORS: ACHING

## 2025-05-04 NOTE — PROGRESS NOTES
Vancomycin Dosing by Pharmacy- FOLLOW UP    Lisa Vu is a 64 y.o. year old female who Pharmacy has been consulted for vancomycin dosing for other (UTI). Based on the patient's indication and renal status this patient is being dosed based on a goal AUC of 400-600.     Renal function is currently stable.    Current vancomycin dose: 1000 mg given every 12 hours    Estimated vancomycin AUC on current dose: 561 mg/L.hr     Visit Vitals  BP 98/52 (BP Location: Right arm, Patient Position: Lying)   Pulse 60   Temp 36.1 °C (97 °F) (Temporal)   Resp 14        Lab Results   Component Value Date    CREATININE 0.86 2025    CREATININE 0.98 2025    CREATININE 0.96 2025        Patient weight is as follows:   Vitals:    25 0400   Weight: 96.3 kg (212 lb 4.9 oz)       Cultures:  No results found for the encounter in last 14 days.       I/O last 3 completed shifts:  In: 2390 (24.8 mL/kg) [P.O.:640; I.V.:350 (3.6 mL/kg); IV Piggyback:1400]  Out: 1200 (12.5 mL/kg) [Urine:1200 (0.3 mL/kg/hr)]  Weight: 96.3 kg   I/O during current shift:  No intake/output data recorded.    Temp (24hrs), Av.5 °C (97.7 °F), Min:36.1 °C (97 °F), Max:36.8 °C (98.2 °F)      Assessment/Plan    Within goal AUC range. Continue current vancomycin regimen.    This dosing regimen is predicted by [employer] to result in the following pharmacokinetic parameters:  Loading dose: N/A  Regimen: 1000 mg IV every 12 hours.  Start time: 18:56 on 2025  Exposure target: AUC24 (range)400-600 mg/L.hr   [id number]: 521 mg/L.hr  AUC24,ss: 561 mg/L.hr  Probability of AUC24 > 400: 98 %  Ctrough,ss: 18.5 mg/L  Probability of Ctrough,ss > 20: 37 %    The next level will be obtained on  at 0500. May be obtained sooner if clinically indicated.   Will continue to monitor renal function daily while on vancomycin and order serum creatinine at least every 48 hours if not already ordered.  Follow for continued vancomycin needs, clinical response,  and signs/symptoms of toxicity.       LELO SOFIA, PharmD            Anesthesia Volume In Cc (Will Not Render If 0): 0.5

## 2025-05-04 NOTE — PROGRESS NOTES
PATIENT NAME: Lisa Vu       MRN: [id number]     SERVICE DATE:  5/4/2025          SERVICE TIME:  10:27 AM          SIGNATURE: Pawan Amezcua MD, MS          ASSESSMENT AND PLAN      fungemia, weakness, hypotension, leukocytosis, hyperglycemia in 67 y/o female with CKD 2, dementia T2DM, recurrent UTI      Blood culture has yeast 2/2 and S. Capitis 1/2  S. Capitis likely contamination  She has abdominal tenderness    Await repeat blood culture  Start fluconazole  Check CT abdomen  Continue current antibiotics and will likely discontinue Vancomycin tomorrow         SUBJECTIVE          PRIMARY SERVICE:  Infectious Disease          INTERVAL HPI: Patient has no complaints         MEDICATIONS:  Scheduled medications  Scheduled Medications[1]  Continuous medications  Continuous Medications[2]  PRN medications  PRN Medications[3]         PHYSICAL EXAM:      Patient Vitals for the past 24 hrs:   BP Temp Temp src Pulse Resp SpO2 Weight   05/04/25 0936 132/63 -- -- 54 19 98 % --   05/04/25 0800 -- -- -- 58 19 -- --   05/04/25 0400 120/62 35.9 °C (96.6 °F) Temporal 60 14 97 % 96.3 kg (212 lb 4.9 oz)   05/04/25 0200 -- -- -- -- -- 97 % --   05/04/25 0000 98/52 36.1 °C (97 °F) Temporal 56 14 95 % --   05/03/25 2005 92/56 -- -- 68 24 97 % --   05/03/25 2000 87/54 36.6 °C (97.9 °F) Temporal 66 (!) 27 98 % --   05/03/25 1600 -- -- -- 66 22 98 % --   05/03/25 1515 93/50 36.4 °C (97.5 °F) Temporal 66 24 98 % --   05/03/25 1255 (!) 76/44 -- -- 68 20 99 % --   05/03/25 1250 87/52 36.7 °C (98.1 °F) -- 66 20 98 % --   05/03/25 1200 -- -- -- 70 25 -- --        Body mass index is 32.28 kg/m².          Eyes: no conjunctiva erythema     ENMT: oral mucosa moist     Neck: symmetric, no mass     Cardiovascular: RRR     Respiratory: CTA     GI: diffuse tenderness    Skin: Warm and dry.     psychiatric: alert and cooperative              DATA:      Diagnostic tests this admission reviewed    Labs from this admission reviewed     Imagines from  this admission reviewed     Cultures: Reviewed           [1] amitriptyline, 10 mg, oral, Nightly  ammonium lactate, , Topical, BID  atorvastatin, 10 mg, oral, Nightly  bisacodyl, 5 mg, oral, Nightly  bisacodyl, 10 mg, rectal, Daily  busPIRone, 10 mg, oral, BID  [Held by provider] dicyclomine, 20 mg, oral, Before meals & nightly  [Held by provider] empagliflozin, 25 mg, oral, Daily  enoxaparin, 40 mg, subcutaneous, q24h  famotidine, 20 mg, oral, BID  fluconazole, 400 mg, intravenous, q2h  [Held by provider] glimepiride, 4 mg, oral, BID  [Held by provider] insulin glargine, 10 Units, subcutaneous, Daily  insulin lispro, 0-5 Units, subcutaneous, Before meals & nightly  lactobacillus acidophilus, 1 tablet, oral, BID  levothyroxine, 75 mcg, oral, Daily  [Held by provider] lisinopril, 20 mg, oral, Daily  metoprolol succinate XL, 25 mg, oral, Daily  moisturizing mouth, 1 spray, oral, Before meals & nightly  nystatin, 1 Application, Topical, BID  piperacillin-tazobactam, 3.375 g, intravenous, q8h  polyethylene glycol, 17 g, oral, Daily  pregabalin, 50 mg, oral, BID  sodium chloride 0.9%, 10 mL, intravenous, q8h BAILEE  vancomycin, 1,000 mg, intravenous, q12h  venlafaxine, 50 mg, oral, Nightly  [2]    [3] PRN medications: acetaminophen **OR** acetaminophen **OR** acetaminophen, dextrose, dextrose, glucagon, glucagon, glycerin, melatonin, moisturizing mouth, ondansetron ODT **OR** ondansetron, vancomycin, white petrolatum

## 2025-05-04 NOTE — PROGRESS NOTES
05/04/25 1414   Discharge Planning   Living Arrangements Other (Comment)  (SNF)   Support Systems Children   Type of Residence Skilled nursing facility   Care Facility Name [facility]   Home or Post Acute Services Post acute facilities (Rehab/SNF/etc)   Type of Post Acute Facility Services Skilled nursing   Expected Discharge Disposition SNF   Does the patient need discharge transport arranged? Yes   RoundTrip coordination needed? Yes   Patient Choice   Patient / Family choosing to utilize agency / facility established prior to hospitalization Yes  (Return to [facility])   Stroke Family Assessment   Stroke Family Assessment Needed No   Intensity of Service   Intensity of Service 0-30 min     Chart reviewed, pt has hx of dementia. No family at bedside. TC to son Osmel. Per Osmel the pt has been at [facility] for skilled nursing. Plan is to transition to LTC when that is completed. Confirms the pt will need transport at time of dc. Osmel lives in the [place] area. DSC tasked to send return referral.

## 2025-05-04 NOTE — ED PROCEDURE NOTE
Procedure  Critical Care    Performed by: Cristian Kyle MD  Authorized by: Cristian Kyle MD    Critical care provider statement:     Critical care time (minutes):  34    Critical care time was exclusive of:  Separately billable procedures and treating other patients and teaching time    Critical care was necessary to treat or prevent imminent or life-threatening deterioration of the following conditions:  Sepsis and CNS failure or compromise    Critical care was time spent personally by me on the following activities:  Ordering and performing treatments and interventions, development of treatment plan with patient or surrogate, ordering and review of laboratory studies, ordering and review of radiographic studies, pulse oximetry, re-evaluation of patient's condition, examination of patient and review of old charts               Cristian Kyle MD  05/03/25 1547

## 2025-05-04 NOTE — CONSULTS
"Reason For Consult   Mild right hydro    History Of Present Illness  Lisa Vu is a 64 y.o. female presenting with altered MS.    CT a/p done 5/4 shows mild right hydro to level of bladder w/ some inflammation right ureter; growing yeast in blood---ID on board; creat fine.   WBC better today.    I personally reviewed/interpretlabs/xrays/notes     Past Medical History  She has a past medical history of Ambulatory dysfunction, Anxiety and depression, Asthma, Carpal tunnel syndrome, Cerebral atherosclerosis, Chronic back pain, Chronic kidney disease, stage II (mild), Chronic nausea, COVID-19 vaccine administered, Dementia, Frequent falls, Frequent patient in emergency department, Gastroesophageal reflux disease, History of rib fracture, Hyperlipidemia, Hypertension, Hypothyroidism, IgA monoclonal gammopathy of uncertain significance, Insulin dependent type 2 diabetes mellitus (Multi), Irritable bowel syndrome with predominant constipation, Lumbar spondylosis, Obstructive sleep apnea, Osteoarthritis, Partial thickness rotator cuff tear, Peptic ulcer disease, Recurrent urinary tract infection, Restless leg syndrome, Type 2 diabetes mellitus with peripheral neuropathy, and Vitamin D deficiency.    Surgical History  She has a past surgical history that includes Colposcopy; Esophagogastroduodenoscopy; Gastroplasty; Cholecystectomy; Salivary gland surgery; Radiofrequency ablation; Flexible sigmoidoscopy; Colonoscopy; Cervical biopsy; and Endometrial biopsy.     Social History  She reports that she has never smoked. She has never used smokeless tobacco. No history on file for alcohol use and drug use.    Family History  Family History[1]     Allergies  Codeine and Metformin    Review of Systems       Physical Exam       Last Recorded Vitals  Blood pressure 111/59, pulse (!) 48, temperature 36 °C (96.8 °F), resp. rate 14, height 1.727 m (5' 8\"), weight 96.3 kg (212 lb 4.9 oz), SpO2 97%.    Relevant Results    Results " for orders placed or performed during the hospital encounter of 05/02/25 (from the past 24 hours)   POCT GLUCOSE   Result Value Ref Range    POCT Glucose 225 (H) 74 - 99 mg/dL   CBC   Result Value Ref Range    WBC 8.8 4.4 - 11.3 x10*3/uL    nRBC 0.0 0.0 - 0.0 /100 WBCs    RBC 4.03 4.00 - 5.20 x10*6/uL    Hemoglobin 11.0 (L) 12.0 - 16.0 g/dL    Hematocrit 36.0 36.0 - 46.0 %    MCV 89 80 - 100 fL    MCH 27.3 26.0 - 34.0 pg    MCHC 30.6 (L) 32.0 - 36.0 g/dL    RDW 15.8 (H) 11.5 - 14.5 %    Platelets 217 150 - 450 x10*3/uL   Basic metabolic panel   Result Value Ref Range    Glucose 54 (LL) 74 - 99 mg/dL    Sodium 139 136 - 145 mmol/L    Potassium 3.3 (L) 3.5 - 5.3 mmol/L    Chloride 105 98 - 107 mmol/L    Bicarbonate 27 21 - 32 mmol/L    Anion Gap 10 10 - 20 mmol/L    Urea Nitrogen 18 6 - 23 mg/dL    Creatinine 0.86 0.50 - 1.05 mg/dL    eGFR 75 >60 mL/min/1.73m*2    Calcium 8.2 (L) 8.6 - 10.3 mg/dL   Magnesium   Result Value Ref Range    Magnesium 2.02 1.60 - 2.40 mg/dL   Vancomycin   Result Value Ref Range    Vancomycin 11.8 5.0 - 20.0 ug/mL   POCT GLUCOSE   Result Value Ref Range    POCT Glucose 84 74 - 99 mg/dL   POCT GLUCOSE   Result Value Ref Range    POCT Glucose 120 (H) 74 - 99 mg/dL   POCT GLUCOSE   Result Value Ref Range    POCT Glucose 135 (H) 74 - 99 mg/dL     CT abdomen pelvis w IV contrast  Result Date: 5/4/2025  Interpreted By:  Mercedes Jones, STUDY: CT ABDOMEN PELVIS W IV CONTRAST;  5/4/2025 11:29 am   INDICATION: Signs/Symptoms:ileus with diffuse abd pain.     COMPARISON: None.   ACCESSION NUMBER(S): [id number]   ORDERING CLINICIAN: LUPE SHINE   TECHNIQUE: CT of the abdomen and pelvis was performed after administration of intravenous contrast. Standard contiguous axial images were obtained at 3 mm slice thickness through the abdomen and pelvis. Coronal and sagittal reconstructions at 3 mm slice thickness were performed.  75 ML of Omnipaque 350 was administered intravenously without immediate  complication.   FINDINGS: LOWER CHEST: There are trace bilateral pleural effusions. Mild subpleural ground-glass opacities in the dependent region of bilateral lower lobes are likely favored to represent atelectasis. Otherwise visualized lung bases are clear. Heart is normal in size. No pericardial effusion. Distal thoracic esophagus demonstrates mild diffuse circumferential wall thickening. There is suggestion of tiny hiatal hernia.   ABDOMEN:   LIVER: The liver is normal in size without evidence of focal liver lesions.   BILE DUCTS: There is mild prominence of intrahepatic biliary tree without significant distention of the common bile duct. This is nonspecific and could be related to post cholecystectomy change.   GALLBLADDER: Gallbladder is surgically absent.   PANCREAS: The pancreas appears unremarkable without evidence of ductal dilatation or masses.   SPLEEN: The spleen is normal in size.   ADRENAL GLANDS: There is mild nodular thickening of the left adrenal gland with a prominent nodule measuring up to 11 x 11 mm in the lateral limb of left adrenal gland. Right adrenal gland is unremarkable.   KIDNEYS AND URETERS: Bilateral kidneys enhance symmetrically. There is mild diffuse dilatation of right ureter with periureteral fat stranding and ureteral wall enhancement. There is also mild right-sided hydronephrosis. No obvious distal right ureteral obstructing calculus is noted.   PELVIS:   BLADDER: Urinary bladder is moderately distended. There is mild asymmetric thickening of the posterior wall of the urinary bladder along the left and right lateral aspects.   REPRODUCTIVE ORGANS: Uterus appears grossly unremarkable. No evidence of pelvic mass lesion.   BOWEL: There are surgical staples extending along the stomach just distal to the gastroesophageal junction, what appears to be a prior gastric bypass surgery. However there is no definite evidence of gastrojejunal anastomosis. There is also no evidence of jejunal  jejunal anastomosis. There is extension of small amount of the intraluminal hyperdensity into the rest of the stomach which connects to the duodenum. These findings could be related to prior gastric bypass with gastrogastric fistula. Otherwise small bowel loops are normal in caliber without segmental distention to suggest obstruction. Large bowel demonstrates moderate stool volume. Appendix is not discretely visualized, however no evidence of inflammatory changes in the right lower quadrant to suggest acute appendicitis.   VESSELS: Abdominal aorta demonstrates mild atherosclerotic calcifications without aneurysmal dilatation. IVC appears grossly unremarkable.   PERITONEUM/RETROPERITONEUM/LYMPH NODES: There is mild peritoneal fat stranding in the lower pelvis adjacent to the bladder. Otherwise no evidence of intraperitoneal fluid collections or free air. No evidence of enlarged lymphadenopathy in the abdomen and pelvis.   BONES AND ABDOMINAL WALL: No acute osseous findings. Abdominal wall soft tissues appear grossly unremarkable.       1.  Mild right-sided hydroureteronephrosis with diffuse ureteral wall thickening and periureteral fat stranding throughout its course to the level of vesicoureteral junction. These findings raise concern for infectious/inflammatory ureteritis. No obvious distal obstructing ureteral calculus is noted on the CT examination. There is also diffuse posterior wall thickening of the urinary bladder extending to level of bilateral vesicoureteral junctions. Constellation of these findings raise concern for cystitis with ascending ureteritis/pyelitis. No significant left-sided ureteral or pelvic dilatation/ureteral inflammation is noted at this point in time. Recommend clinical and laboratory correlation for cystitis. This asymmetric posterior wall thickening of the urinary bladder is most likely favored to be related to cystitis, however other possibilities including neoplastic process can not  be completely excluded. Recommend urologic consultation and further evaluation as clinically warranted. 2. Findings suggestive of prior bariatric surgery. There is relatively increased intraluminal hyperdensity in the distal esophagus extending to the gastric pouch with slow trickle of this intraluminal hyperdensity into the rest of the stomach which connects with the duodenum. These findings could be related to prior surgery (although the type of surgery is unclear). Recommend correlation with type of surgery and patient's symptomatology associated with these findings. 3. Mild diffuse circumferential thickening of the distal thoracic esophagus with suggestion of small hiatal hernia. These findings are concerning for reflux. Recommend correlation with patient's symptomatology. Gastroenterology consultation and further evaluation with upper GI endoscopy can be performed for further evaluation as clinically warranted. 4. Small bilateral pleural effusions. Mild subpleural ground-glass opacities in the dependent region of bilateral lower lobes are likely favored to represent dependent atelectasis, however developing pneumonia can also be considered in the differential in appropriate clinical setting. 5. Nodular thickening of the left adrenal gland with the prominent nodule measuring up to 1.1 cm as described above. Recommend further evaluation with a dedicated adrenal mass protocol.   MACRO: Critical Finding:  See findings. Notification was initiated on 5/4/2025 at 12:56 pm by Dr. Mercedes Jones.  (**-YCF-**) Instructions:     Dictation workstation:   [id number]    XR abdomen 1 view  Result Date: 5/4/2025  Interpreted By:  Frank Armendariz, STUDY: XR ABDOMEN 1 VIEW; 5/4/2025 11:07 am   INDICATION: Signs/Symptoms:ileus   COMPARISON: Plain radiographs from 05/03/2025.   ACCESSION NUMBER(S): [id number]   ORDERING CLINICIAN: LUPE SHINE   TECHNIQUE: Number of films: Four view KUB   FINDINGS: Surgical sutures are noted  in the stomach; cholecystectomy clips are also seen. There is mild gaseous distention of small-bowel loops in the abdomen and pelvis, overall similar to the prior exam. Gas and moderate amount of fecal material are noted in the colon. There is no free intraperitoneal air. No abnormal intra-abdominal calcifications are seen. Degenerative changes involve the spine.       Gaseous distention of small-bowel loops again seen, most likely due to ileus. No evidence of bowel obstruction. Follow-up as needed.   Signed by: Frank Armendariz 5/4/2025 12:41 PM Dictation workstation:   [id number]    XR chest 1 view  Result Date: 5/3/2025  Interpreted By:  Herrera Nelson, STUDY: XR CHEST 1 VIEW;  5/3/2025 3:33 pm   INDICATION: Signs/Symptoms:chest pain.     COMPARISON: None.   ACCESSION NUMBER(S): [id number]   ORDERING CLINICIAN: STEVE GARCIA   FINDINGS: Multilevel thoracic spondylosis. Bilateral glenohumeral joint degenerative change. Degenerative change of the left acromioclavicular joint noted.   Heart and mediastinal contours are unremarkable. No focal consolidation or alveolar edema.       1.  No radiographic evidence of acute cardiopulmonary disease. No focal consolidation or alveolar edema.       MACRO: None   Signed by: Herrera Nelson 5/3/2025 10:07 PM Dictation workstation:   [id number]    ECG 12 lead  Result Date: 5/3/2025  Sinus tachycardia Incomplete right bundle branch block Left anterior fascicular block Minimal voltage criteria for LVH, may be normal variant ( Leighton product ) Septal infarct , age undetermined Abnormal ECG No previous ECGs available    XR abdomen 1 view  Result Date: 5/3/2025  Interpreted By:  Frank Armendariz, STUDY: XR ABDOMEN 1 VIEW; 5/3/2025 9:40 am   INDICATION: Signs/Symptoms:fgollow up on ileus   COMPARISON: 05/02/2025.   ACCESSION NUMBER(S): [id number]   ORDERING CLINICIAN: LUPE SHINE   TECHNIQUE: Number of films: Two-view KUB   FINDINGS: Gaseous distention of small-bowel loops centrally  in the abdomen is again noted measuring up to 3.5 cm, similar to prior. Gas and stool is also seen in the colon. There is no free intraperitoneal air. Surgical sutures/staples are again seen in the upper abdomen. The osseous structures are unchanged.       Persistent gaseous distention of small-bowel loops, most likely due to ileus. Follow-up as needed.   Signed by: Frank Armendariz 5/3/2025 10:18 AM Dictation workstation:   [id number]    XR abdomen 1 view  Result Date: 5/2/2025  Interpreted By:  Erik Morton, STUDY: XR ABDOMEN 1 VIEW;  5/2/2025 8:12 pm   INDICATION: Signs/Symptoms:constipation, recent history fecal impaction.   COMPARISON: None.   ACCESSION NUMBER(S): [id number]   ORDERING CLINICIAN: SONIA CARRASQUILLO   FINDINGS: There is nonspecific bowel gas pattern with gaseous dilated bowel loop in the left abdomen measuring 3.7 cm in diameter. Postop change in the upper abdomen. There is contrast and urinary bladder in right renal pelvis.       Dilated small bowel loop in the left abdomen measuring 3.7 cm in diameter may be secondary to ileus, however if there is persistent concern for acute abdominal pathology, CT is recommended for further evaluation.   MACRO: None   Signed by: Erik Morton 5/2/2025 8:45 PM Dictation workstation:   [id number]    CT brain attack head wo IV contrast  Result Date: 5/2/2025  Interpreted By:  Anirudh Gabriel, STUDY: CT BRAIN ATTACK HEAD WO IV CONTRAST;  5/2/2025 5:55 pm   INDICATION: Signs/Symptoms:Stroke Evaluation.     COMPARISON: None.   ACCESSION NUMBER(S): [id number]   ORDERING CLINICIAN: TRISTIN JALLOH   TECHNIQUE: Noncontrast axial CT scan of head was performed. Angled reformats in brain and bone windows were generated. The images were reviewed in bone, brain, blood and soft tissue windows.   FINDINGS: CSF Spaces: The ventricles, sulci and basal cisterns are within normal limits. There is no extraaxial fluid collection. Global volume loss. Degree does appear to be advanced  for age   Parenchyma:  The grey-white differentiation is intact. There is no mass effect or midline shift.  There is no intracranial hemorrhage.   Hyperostosis.   Paranasal sinuses and mastoids: Visualized paranasal sinuses and mastoids are clear.       No evidence of acute cortical infarct or intracranial hemorrhage. Volume loss. Degree does appear to be advanced for age. If persistent concern, consider MRI   MACRO: Anirudh Gabriel message by epic leti JALLOH on 5/2/2025 at 6:09 pm.  (**-RCF-**) Findings:  See findings.     Signed by: Anirudh Gabriel 5/2/2025 6:10 PM Dictation workstation:   [id number]    CT brain attack angio head and neck W and WO IV contrast  Result Date: 5/2/2025  Interpreted By:  Ted Vera, STUDY: CT BRAIN ATTACK ANGIO HEAD AND NECK W AND WO IV CONTRAST; 5/2/2025 5:56 pm   INDICATION: Signs/Symptoms:aphasia.     COMPARISON: Head CT performed immediately prior to the current study today May 2, 2025   ACCESSION NUMBER(S): [id number]   ORDERING CLINICIAN: TRISTIN JALLOH   TECHNIQUE: CT angiogram of head and neck arteries. 70 ML of Omnipaque 350 was administered intravenously and axial images of the head and neck were acquired.  Coronal, sagittal, and 3-D reconstructions were provided for review.   FINDINGS: CTA NECK FINDINGS:   Imaged aortic arch: There is a recurrent right subclavian artery incidentally noted, a common normal variation, retroesophageal in location. The right vertebral artery arises from the right subclavian artery. Mild atherosclerotic changes are present without significant flow-limiting stenosis.   Right common and cervical internal carotid arteries: There are atherosclerotic changes with less than 50% narrowing. No evidence for dissection or pseudoaneurysm.   Left common and cervical internal carotid arteries: There are minimal atherosclerotic changes without significant flow-limiting stenosis. No evidence for dissection or pseudoaneurysm.   Right cervical vertebral  artery: There is no significant flow-limiting stenosis. No evidence for dissection or pseudoaneurysm.   Left cervical vertebral artery:There is no significant flow-limiting stenosis. No evidence for dissection or pseudoaneurysm.   Non-arterial findings: No neck masses are noted. There are no destructive osseous lesions. Lung apices are clear.   CTA HEAD FINDINGS:   Vascular malformations: There is no evidence of aneurysm formation or arteriovenous malformation.   Cavernous segments: Atherosclerotic changes are present without significant flow-limiting stenosis.   Right anterior circulation: There is no significant flow-limiting stenosis, aneurysm, or other vascular abnormality.   Left anterior circulation: There is no significant flow-limiting stenosis, aneurysm, or other vascular abnormality.   Posterior circulation:  Mild atherosclerotic plaque involving proximal intracranial left vertebral artery without appreciable luminal narrowing. There is no significant flow-limiting stenosis, aneurysm, or other vascular abnormality.   Anterior and Posterior communicating arteries: The anterior communicating artery is unremarkable. Subtle suggestion that the left posterior communicating artery is developmentally hypoplastic rather than atretic. The right posterior communicating artery is not seen, but notably the right posterior cerebral artery P1 segment is congenitally large..       1. CTA of Neck arteries demonstrates no significant flow-limiting stenosis or dissection. 2. CTA of Intracranial arteries demonstrates no evidence for large vessel occlusion or other acute findings. 3. Multifocal mild atherosclerosis, without evidence for hemodynamically significant stenosis.   MACRO: None   Signed by: Ted Vera 5/2/2025 6:08 PM Dictation workstation:   [id number]         Assessment/Plan       -mild right hydro  -fungemia  -altered MS    May have ascending uti w/ fungus as source of hydro; no need for further gu  intervention now and no stent needed  Overall, abx per ID and likely will need antifungal for 7-14days                I spent *** minutes in the professional and overall care of this patient.      Juancarlos Becerra MD         [1]   Family History  Problem Relation Name Age of Onset    Alzheimer's disease Mother      Thyroid disease Mother      Diabetes Father      Stroke Father      Diabetes Sister      Hypertension Sister

## 2025-05-04 NOTE — CARE PLAN
Problem: Pain - Adult  Goal: Verbalizes/displays adequate comfort level or baseline comfort level  Outcome: Progressing     Problem: Safety - Adult  Goal: Free from fall injury  Outcome: Progressing     Problem: Discharge Planning  Goal: Discharge to home or other facility with appropriate resources  Outcome: Progressing     Problem: Chronic Conditions and Co-morbidities  Goal: Patient's chronic conditions and co-morbidity symptoms are monitored and maintained or improved  Outcome: Progressing     Problem: Nutrition  Goal: Nutrient intake appropriate for maintaining nutritional needs  Outcome: Progressing     Problem: Fall/Injury  Goal: Not fall by end of shift  Outcome: Progressing  Goal: Be free from injury by end of the shift  Outcome: Progressing  Goal: Verbalize understanding of personal risk factors for fall in the hospital  Outcome: Progressing  Goal: Verbalize understanding of risk factor reduction measures to prevent injury from fall in the home  Outcome: Progressing  Goal: Pace activities to prevent fatigue by end of the shift  Outcome: Progressing     Problem: Skin  Goal: Decreased wound size/increased tissue granulation at next dressing change  Outcome: Progressing  Goal: Participates in plan/prevention/treatment measures  Outcome: Progressing  Goal: Prevent/manage excess moisture  Outcome: Progressing  Goal: Prevent/minimize sheer/friction injuries  Outcome: Progressing  Goal: Promote/optimize nutrition  Outcome: Progressing  Goal: Promote skin healing  Outcome: Progressing     Problem: Infection prevention/bleeding  Goal: Infection s/sx managed  Outcome: Progressing  Goal: No further progression of infection  Outcome: Progressing  Goal: No signs of bleeding  Outcome: Progressing  Goal: Normal coagulation studies  Outcome: Progressing     Problem: Perfusion/Cardiac  Goal: Adequate perfusion to organs/extremities  Outcome: Progressing  Goal: Hemodynamically stable  Outcome: Progressing  Goal: No  cardiac arrhythmias  Outcome: Progressing     Problem: Respiratory/Oxygenation  Goal: No signs of respiratory compromise  Outcome: Progressing  Goal: Tolerates activity without increased O2 demands  Outcome: Progressing     Problem: Neuro/Coping  Goal: Minimal anxiety; utilize coping mechanisms  Outcome: Progressing  Goal: No signs of neurological compromise  Outcome: Progressing  Goal: Increase self care/family involvement  Outcome: Progressing     Problem: Fluid/Electrolyte/Nutrition  Goal: Fluid balance within 1 liter of normovolemia  Outcome: Progressing  Goal: No signs of renal failure  Outcome: Progressing  Goal: Normal electrolyte levels  Outcome: Progressing  Goal: Normal glucose levels  Outcome: Progressing  Goal: Tolerates nutritional intake  Outcome: Progressing     Problem: Diabetes  Goal: Maintain glucose levels >70mg/dl to <250mg/dl throughout shift  Outcome: Progressing   The patient's goals for the shift include      The clinical goals for the shift include spo2 will remain 92% or greater t/o shift    Pt had many loose incontinent Bms today. She had positive urine cultures.  Attempted a straight cath but it was too intolerable for her as she is red and inflamed in her kelsi area.  Notified physician.  She got tylenol for hip pain today one time and it brought her pain down.  She tolerated her clears.  She was seen by speech and cleared to advance to full diet and we are waiting for CT scan to come back before MD will advance diet.

## 2025-05-04 NOTE — NURSING NOTE
THE PT IS A+o x3; alert to contact but says some bizarre and random thoughts. The pt is somewhat cooperative. Can do more than she lets on. Slaughter x4 minimally. Requires total care, freq hygenic measures, skin care and repositioning. VSS. Temp afebrile. Resps are even and unlabored on ra. The pt has an external purwick catheter in for I+/o's. Has not stooled. Noted to have a rash (RED) on her bilat inner thighs. Nistatin powder and skin barrier creams were applied. The pt has 2+ Pulses x4 extremities and neuropathy complaints x4 ext. Able to sense touch easily. Complains of rt hip pain assoc with repositioning. Subsides with rest. . EKG nsr/1st degreehb. The pt denies any chest pain, sob, nausea, abd pain or dizziness at this time. BS was 280 and she was covered per her ISS. Lab callled. ONE BOTTLE OF ANEROBIC BLD CULTURE WAS POSITIVE WITH GM + Cocci.   [facility] was notified.   2300- Urine samples for legonella and strep was obtained and sent to lab.  0300- The pts second blood culture aerobic bottle was positive for yeast. [facility] was notified. The pt is resting comfortably. Repositioned q 2 hrs and po fluids encouraged. The pt continues to receive iv vanco  AND ZOSYN; I+d was placed on consult. Demeanor drowsy; Cooperative to contact but confused to details.  Wanting to sleep No distress observed. Resps even and unlabored.  0400- Incontinent of a sm amt of soft brown stool. Hygenic measures given. Skin barrier creams applied.  0645- iv difluvan and vancomycin given. The pts rt ac heplock started leaking. Without  infiltration. Site discontinued. Dsd applied. No pain voiced by the pt.   Lab called. BS 54. The pt is arouseable.  The pt stated this happen s every morning. Hungry for food.Given o.j. Fully awake and interactive.  0730 BS 84. Bkft ordered. Still on clear liquids. No choking observed. Hungry for  Food. Good urine outputs observed.

## 2025-05-04 NOTE — CARE PLAN
The patient's goals for the shift include  TO GET SOME    The clinical goals for the shift include THE PT WILL BE HDS, MAINTAIN SBP > 90MMHG; O2 SATS > 92%; bs < 150;    HAVE NO NEURO DEFECITS ,  HAVE NO NEW SIGNS OF INFECTION AND MAKE URINE > 30ML/HR BY THE END OF THIS SHIFT.

## 2025-05-04 NOTE — PROGRESS NOTES
"Lisa Vu is a 64 y.o. female on day 2 of admission presenting with Toxic metabolic encephalopathy, noted to have fungemia, was evaluated by ID, started on econazole.    Subjective   No cp       Objective   Lying in bed, no acute distress    Current Medications[1]       Physical Exam  Constitutional:       Comments: Lying in bed, no acute   HENT:      Head: Normocephalic.   Eyes:      Conjunctiva/sclera: Conjunctivae normal.   Cardiovascular:      Rate and Rhythm: Normal rate and regular rhythm.   Pulmonary:      Effort: Pulmonary effort is normal.      Breath sounds: Normal breath sounds.   Abdominal:      General: Bowel sounds are normal.      Palpations: Abdomen is soft.   Musculoskeletal:      Comments: No edema   Skin:     General: Skin is warm and dry.   Neurological:      General: No focal deficit present.           Last Recorded Vitals  Blood pressure 111/59, pulse (!) 48, temperature 36 °C (96.8 °F), resp. rate 14, height 1.727 m (5' 8\"), weight 96.3 kg (212 lb 4.9 oz), SpO2 97%.  Intake/Output last 3 Shifts:  I/O last 3 completed shifts:  In: 2980 (30.9 mL/kg) [P.O.:880; I.V.:350 (3.6 mL/kg); IV Piggyback:1750]  Out: 1900 (19.7 mL/kg) [Urine:1900 (0.5 mL/kg/hr)]  Weight: 96.3 kg           Labs:       Results for orders placed or performed during the hospital encounter of 05/02/25 (from the past 24 hours)   POCT GLUCOSE   Result Value Ref Range    POCT Glucose 135 (H) 74 - 99 mg/dL   POCT GLUCOSE   Result Value Ref Range    POCT Glucose 225 (H) 74 - 99 mg/dL   CBC   Result Value Ref Range    WBC 8.8 4.4 - 11.3 x10*3/uL    nRBC 0.0 0.0 - 0.0 /100 WBCs    RBC 4.03 4.00 - 5.20 x10*6/uL    Hemoglobin 11.0 (L) 12.0 - 16.0 g/dL    Hematocrit 36.0 36.0 - 46.0 %    MCV 89 80 - 100 fL    MCH 27.3 26.0 - 34.0 pg    MCHC 30.6 (L) 32.0 - 36.0 g/dL    RDW 15.8 (H) 11.5 - 14.5 %    Platelets 217 150 - 450 x10*3/uL   Basic metabolic panel   Result Value Ref Range    Glucose 54 (LL) 74 - 99 mg/dL    Sodium 139 136 - " 145 mmol/L    Potassium 3.3 (L) 3.5 - 5.3 mmol/L    Chloride 105 98 - 107 mmol/L    Bicarbonate 27 21 - 32 mmol/L    Anion Gap 10 10 - 20 mmol/L    Urea Nitrogen 18 6 - 23 mg/dL    Creatinine 0.86 0.50 - 1.05 mg/dL    eGFR 75 >60 mL/min/1.73m*2    Calcium 8.2 (L) 8.6 - 10.3 mg/dL   Magnesium   Result Value Ref Range    Magnesium 2.02 1.60 - 2.40 mg/dL   Vancomycin   Result Value Ref Range    Vancomycin 11.8 5.0 - 20.0 ug/mL   POCT GLUCOSE   Result Value Ref Range    POCT Glucose 84 74 - 99 mg/dL   POCT GLUCOSE   Result Value Ref Range    POCT Glucose 120 (H) 74 - 99 mg/dL   POCT GLUCOSE   Result Value Ref Range    POCT Glucose 135 (H) 74 - 99 mg/dL        Radiology  CT abdomen pelvis w IV contrast  Result Date: 5/4/2025  1.  Mild right-sided hydroureteronephrosis with diffuse ureteral wall thickening and periureteral fat stranding throughout its course to the level of vesicoureteral junction. These findings raise concern for infectious/inflammatory ureteritis. No obvious distal obstructing ureteral calculus is noted on the CT examination. There is also diffuse posterior wall thickening of the urinary bladder extending to level of bilateral vesicoureteral junctions. Constellation of these findings raise concern for cystitis with ascending ureteritis/pyelitis. No significant left-sided ureteral or pelvic dilatation/ureteral inflammation is noted at this point in time. Recommend clinical and laboratory correlation for cystitis. This asymmetric posterior wall thickening of the urinary bladder is most likely favored to be related to cystitis, however other possibilities including neoplastic process can not be completely excluded. Recommend urologic consultation and further evaluation as clinically warranted. 2. Findings suggestive of prior bariatric surgery. There is relatively increased intraluminal hyperdensity in the distal esophagus extending to the gastric pouch with slow trickle of this intraluminal hyperdensity  into the rest of the stomach which connects with the duodenum. These findings could be related to prior surgery (although the type of surgery is unclear). Recommend correlation with type of surgery and patient's symptomatology associated with these findings. 3. Mild diffuse circumferential thickening of the distal thoracic esophagus with suggestion of small hiatal hernia. These findings are concerning for reflux. Recommend correlation with patient's symptomatology. Gastroenterology consultation and further evaluation with upper GI endoscopy can be performed for further evaluation as clinically warranted. 4. Small bilateral pleural effusions. Mild subpleural ground-glass opacities in the dependent region of bilateral lower lobes are likely favored to represent dependent atelectasis, however developing pneumonia can also be considered in the differential in appropriate clinical setting. 5. Nodular thickening of the left adrenal gland with the prominent nodule measuring up to 1.1 cm as described above. Recommend further evaluation with a dedicated adrenal mass protocol.   MACRO: Critical Finding:  See findings. Notification was initiated on 5/4/2025 at 12:56 pm by Dr. Mecredes Jones.  (**-YCF-**) Instructions:     Dictation workstation:   [id number]    XR abdomen 1 view  Result Date: 5/4/2025  Gaseous distention of small-bowel loops again seen, most likely due to ileus. No evidence of bowel obstruction. Follow-up as needed.   Signed by: Frank rAmendariz 5/4/2025 12:41 PM Dictation workstation:   [id number]    XR chest 1 view  Result Date: 5/3/2025  1.  No radiographic evidence of acute cardiopulmonary disease. No focal consolidation or alveolar edema.       MACRO: None   Signed by: Herrera Nelson 5/3/2025 10:07 PM Dictation workstation:   [id number]    XR abdomen 1 view  Result Date: 5/3/2025  Persistent gaseous distention of small-bowel loops, most likely due to ileus. Follow-up as needed.   Signed by: Frank  Perezmiles 5/3/2025 10:18 AM Dictation workstation:   [id number]    XR abdomen 1 view  Result Date: 5/2/2025  Dilated small bowel loop in the left abdomen measuring 3.7 cm in diameter may be secondary to ileus, however if there is persistent concern for acute abdominal pathology, CT is recommended for further evaluation.   MACRO: None   Signed by: Erik Morton 5/2/2025 8:45 PM Dictation workstation:   [id number]    CT brain attack head wo IV contrast  Result Date: 5/2/2025  No evidence of acute cortical infarct or intracranial hemorrhage. Volume loss. Degree does appear to be advanced for age. If persistent concern, consider MRI   MACRO: Anirudh Gabriel message by epic leti JALLOH on 5/2/2025 at 6:09 pm.  (**-RCF-**) Findings:  See findings.     Signed by: Anirudh Gabriel 5/2/2025 6:10 PM Dictation workstation:   [id number]    CT brain attack angio head and neck W and WO IV contrast  Result Date: 5/2/2025  1. CTA of Neck arteries demonstrates no significant flow-limiting stenosis or dissection. 2. CTA of Intracranial arteries demonstrates no evidence for large vessel occlusion or other acute findings. 3. Multifocal mild atherosclerosis, without evidence for hemodynamically significant stenosis.   MACRO: None   Signed by: Ted Vera 5/2/2025 6:08 PM Dictation workstation:   [id number]        Assessment/Plan   Assessment & Plan  Toxic metabolic encephalopathy     Cystitis with hematuria     Hypovolemia dehydration     Severe sepsis with acute organ dysfunction (Multi)     Fungemia        Hydroureteronephrosis    PLAN: Patient was noted to have fungemia, was started on IV Diflucan, c/w IV Zosyn and vancomycin per ID, monitor Vanco level, also noted to hydroureteronephrosis per CT scan, was evaluated by urology, medicine labs reviewed, discussed with nursing and CNP, for now c/w current Rx, follow closely.               Sushil Orozco MD           [1]   Current Facility-Administered Medications:      acetaminophen (Tylenol) tablet 650 mg, 650 mg, oral, q4h PRN, 650 mg at 05/04/25 1307 **OR** acetaminophen (Tylenol) oral liquid 650 mg, 650 mg, oral, q4h PRN **OR** acetaminophen (Tylenol) suppository 650 mg, 650 mg, rectal, q4h PRN, Lillian Ahumada APRCHRISTIE-CNP    amitriptyline (Elavil) tablet 10 mg, 10 mg, oral, Nightly, Lillian Ahumada APRN-CNP, 10 mg at 05/03/25 2227    ammonium lactate (Lac-Hydrin) 12 % lotion, , Topical, BID, PEARL Lara-CNP, Given at 05/04/25 0938    atorvastatin (Lipitor) tablet 10 mg, 10 mg, oral, Nightly, PEARL Lara-CNP, 10 mg at 05/03/25 2225    bisacodyl (Dulcolax) EC tablet 5 mg, 5 mg, oral, Nightly, Lillian Ahumada APRN-CNP, 5 mg at 05/03/25 2226    bisacodyl (Dulcolax) suppository 10 mg, 10 mg, rectal, Daily, Savita Mckeon, PEARL-CNP, 10 mg at 05/03/25 1125    busPIRone (Buspar) tablet 10 mg, 10 mg, oral, BID, PEARL Lara-CNP, 10 mg at 05/04/25 0938    dextrose 50 % injection 12.5 g, 12.5 g, intravenous, q15 min PRN, Lillian Ahumada APRN-CNP    dextrose 50 % injection 25 g, 25 g, intravenous, q15 min PRN, Lillian Ahumada APRN-CNP    [Held by provider] dicyclomine (Bentyl) capsule 20 mg, 20 mg, oral, Before meals & nightly, PEARL Lara-CNP    [Held by provider] empagliflozin (Jardiance) tablet 25 mg, 25 mg, oral, Daily, Lillian Ahumada APRN-CNP    enoxaparin (Lovenox) syringe 40 mg, 40 mg, subcutaneous, q24h, ANGELINA Lara, 40 mg at 05/04/25 0939    [START ON 5/5/2025] fluconazole (Diflucan) 400 mg in sodium chloride (iso)  mL, 400 mg, intravenous, q24h, Pawan Amezcua MD, MS    [Held by provider] glimepiride (Amaryl) tablet 4 mg, 4 mg, oral, BID, ANGELINA Lara, 4 mg at 05/03/25 1842    glucagon (Glucagen) injection 1 mg, 1 mg, intramuscular, q15 min PRN, ANGELINA Lara    glucagon (Glucagen) injection 1 mg, 1 mg, intramuscular, q15 min PRN, ANGELINA Lara     glycerin (Adult) 2 g suppository 1 suppository, 1 suppository, rectal, Daily PRN, Lillian Ahumada APRN-CNP    [Held by provider] insulin glargine (Lantus) injection 10 Units, 10 Units, subcutaneous, Daily, Lillianleila Ahumada, APRN-CNP, 10 Units at 05/03/25 0925    insulin lispro injection 0-5 Units, 0-5 Units, subcutaneous, Before meals & nightly, Lillian Ahumada APRN-CNP, 2 Units at 05/03/25 2228    lactobacillus acidophilus tablet 1 tablet, 1 tablet, oral, BID, Lillian Ahumada APRN-CNP, 1 tablet at 05/04/25 0939    levothyroxine (Synthroid, Levoxyl) tablet 75 mcg, 75 mcg, oral, Daily, Lillian Ahumada, APRN-CNP, 75 mcg at 05/04/25 0556    [Held by provider] lisinopril tablet 20 mg, 20 mg, oral, Daily, Lillian Ahumada APRN-CNP, 20 mg at 05/03/25 0922    melatonin tablet 3 mg, 3 mg, oral, Nightly PRN, Lillian Ahumada, APRN-CNP, 3 mg at 05/03/25 2226    metoprolol succinate XL (Toprol-XL) 24 hr tablet 25 mg, 25 mg, oral, Daily, Lillian Ahumada, APRN-CNP, 25 mg at 05/03/25 0922    moisturizing mouth (Biotene Oral Dry Mouth) solution 1 spray, 1 spray, oral, Before meals & nightly, Lillian Ahumada, APRN-CNP, 1 spray at 05/04/25 1628    moisturizing mouth (Biotene Oral Dry Mouth) solution 1 spray, 1 spray, oral, PRN, Lillian Ahumada, APRN-CNP    nystatin (Mycostatin) 100,000 unit/gram powder 1 Application, 1 Application, Topical, BID, Lillian Ahumada, APRN-CNP, 1 Application at 05/04/25 0940    ondansetron ODT (Zofran-ODT) disintegrating tablet 4 mg, 4 mg, oral, q8h PRN **OR** ondansetron (Zofran) injection 4 mg, 4 mg, intravenous, q8h PRN, ANGELINA Lara    pantoprazole (Protonix) injection 40 mg, 40 mg, intravenous, Daily, ANGELINA Verduzco, 40 mg at 05/04/25 1630    piperacillin-tazobactam (Zosyn) 3.375 g in dextrose (iso) IV 50 mL, 3.375 g, intravenous, q8h, ANGELINA Lara, Last Rate: 0 mL/hr at 05/04/25 1508, 3.375 g at 05/04/25 1508    polyethylene glycol  (Glycolax, Miralax) packet 17 g, 17 g, oral, Daily, ANGELINA Lara, 17 g at 05/03/25 0921    pregabalin (Lyrica) capsule 50 mg, 50 mg, oral, BID, ANGELINA Lara, 50 mg at 05/04/25 0939    sodium chloride 0.9% flush 10 mL, 10 mL, intravenous, q8h BAILEE, ANGELINA Lara, 10 mL at 05/04/25 1510    vancomycin (Vancocin) 1,000 mg in dextrose 5%  mL, 1,000 mg, intravenous, q12h, ANGELINA Verduzco, Stopped at 05/04/25 0937    vancomycin (Vancocin) pharmacy to dose - pharmacy monitoring, , miscellaneous, Daily PRN, ANGELINA Verduzco    venlafaxine (Effexor) tablet 50 mg, 50 mg, oral, Nightly, ANGELINA Lara, 50 mg at 05/03/25 2226    white petrolatum (Aquaphor) ointment, , Topical, q1h PRN, ANGELINA Lara

## 2025-05-04 NOTE — PROGRESS NOTES
Speech-Language Pathology    SLP Adult Inpatient Speech-Language Pathology Clinical Swallow Evaluation    Patient Name: Lisa Vu  MRN: [id number]  Today's Date: 5/4/2025   Time Calculation  Start Time: 0912  Stop Time: 0926  Time Calculation (min): 14 min         Current Problem:   1. Severe sepsis with acute organ dysfunction (Multi)        2. Altered mental status, unspecified altered mental status type        3. Urinary tract infection in female            Recommendations:  Risk for Aspiration: Given established risk factors (DM, dependency for care, current infection/increased WBC), patient appears to be at a low risk for developing aspiration related complications.  Solid Diet Recommendations : Regular (IDDSI Level 7)  Liquid Diet Recommendations: Thin (IDDSI Level 0)  Medication Administration Recommendations:  (Per patient preference and tolerance)  Follow up treatments:  (No needs)    Assessment:  Assessment Results: Patient is able to pass the Abernathy Swallow Protocol leading to lower suspicion for aspiration. No overt s/s of cardiopulmonary distress/decline with any PO. Recommend Regular Diet with Thin Liquids once medically cleared by medical team (per notes, concerns for possible ?ileus and currently on clear liquids). No skilled SLP services indicated.  Prognosis: Good  Medical Staff Made Aware: Yes    Plan:  Inpatient/Swing Bed or Outpatient: Inpatient  SLP Plan: No skilled SLP  No Skilled SLP: At baseline function  SLP Discharge Recommendations: D/C as patient is functional for this level of care  Diet Recommendations: Solid, Liquid  Solid Consistency: Regular (IDDSI Level 7)  Liquid Consistency: Thin (IDDSI Level 0)  Next Treatment Priority: No SLP needs  Discussed POC: Patient, Nursing  Discussed Risks/Benefits: Yes, Patient, Nursing  SLP - OK to Discharge: Yes      Subjective   Per H&P-Lisa Vu is a 64 y.o. with a history of type 2 diabetes, CKD, GERD, hypothyroidism, hyperlipidemia  presenting today with concern for altered mental status, not following commands.  On arrival, patient was following commands, was able to speak, was unable to move bilateral lower extremities.  Was otherwise difficult to obtain history from given her mental status.       Patient reports no difficulties swallowing solids, liquids, or pills. Patient reports difficulty swallowing when increased mucous and hoarse vocal quality (though voice appeared WFL throughout session).    General Visit Information:  Patient Class: Inpatient  Living Environment: Nursing home (skilled/long-term)  Ordering Physician: ANGELNIA Lara  Reason for Referral: Swallow Evaluation  Past Medical History Relevant to Rehab: dementia, CKD3, T2DM, frequent falls, UTI  Prior Level of Function: Decreased function  Patient Seen During This Visit: Yes  Prior to Session Communication: Bedside nurse  Reviewed Procedures and Risks: Yes  Date of Onset: 05/02/25  Date of Order: 05/02/25  [employer] Diet: Regular Diet with Thin Liquids  Current Diet : Clear Liquids (thin)  Dysphagia Diagnosis: Within functional limits    Vital Signs:  O2 Requirement: Room air  RR: 19  SpO2: 98%  Temperature: 96.6    CXR 5/3/25  IMPRESSION:  1.  No radiographic evidence of acute cardiopulmonary disease. No  focal consolidation or alveolar edema.    Objective   Baseline Assessment:  Respiratory Status: Room air  Behavior/Cognition: Alert, Cooperative, Pleasant mood  Vision: Functional for self-feeding  Patient Positioning: Upright in Bed  Baseline Vocal Quality: Normal  Volitional Swallow: Within Functional Limits    Pain:  Pain Assessment  Pain Assessment: 0-10  0-10 (Numeric) Pain Score: 0 - No pain    Oral/Motor Assessment:  Oral Hygiene: WFL, moist and pink  Dentition: Adequate/Natural  Oral Motor: Within Functional Limits    Consistencies Trialed:  Consistencies Trialed: Yes  Consistencies Trialed: Thin (IDDSI Level 0) - Straw, Regular (IDDSI Level  7)    Clinical Observations:  Patient Positioning: Upright in Bed  Management of Oral Secretions: Adequate  Was The 3 oz Swallow Protocol Completed: Yes (Passed- low suspicion for aspiration)  Clinical Observation Comment: Patient able to feed self with some assistance. WFL bilabial seal, no anterior spillage, timely and complete mastication, and no oral residue. No overt s/s of cardiopulmonary distress/decline with any PO trialed.    Education: SLP educated patient and RN on results/recommendations of evaluation.    Consultations/Referrals/Coordination of Services: N/A    Hillary Covarrubias MA, CCC-SLP  Speech Language Pathologist

## 2025-05-05 ENCOUNTER — APPOINTMENT (OUTPATIENT)
Dept: RADIOLOGY | Facility: HOSPITAL | Age: 65
DRG: 871 | End: 2025-05-05
Payer: COMMERCIAL

## 2025-05-05 VITALS
SYSTOLIC BLOOD PRESSURE: 166 MMHG | TEMPERATURE: 96.6 F | RESPIRATION RATE: 14 BRPM | WEIGHT: 212.3 LBS | BODY MASS INDEX: 32.18 KG/M2 | HEIGHT: 68 IN | HEART RATE: 54 BPM | DIASTOLIC BLOOD PRESSURE: 72 MMHG | OXYGEN SATURATION: 100 %

## 2025-05-05 LAB
ANION GAP SERPL CALC-SCNC: 11 MMOL/L (ref 10–20)
BUN SERPL-MCNC: 13 MG/DL (ref 6–23)
CALCIUM SERPL-MCNC: 8.1 MG/DL (ref 8.6–10.3)
CHLORIDE SERPL-SCNC: 106 MMOL/L (ref 98–107)
CO2 SERPL-SCNC: 26 MMOL/L (ref 21–32)
CREAT SERPL-MCNC: 0.84 MG/DL (ref 0.5–1.05)
EGFRCR SERPLBLD CKD-EPI 2021: 77 ML/MIN/1.73M*2
ERYTHROCYTE [DISTWIDTH] IN BLOOD BY AUTOMATED COUNT: 16.3 % (ref 11.5–14.5)
GLUCOSE BLD MANUAL STRIP-MCNC: 178 MG/DL (ref 74–99)
GLUCOSE BLD MANUAL STRIP-MCNC: 201 MG/DL (ref 74–99)
GLUCOSE BLD MANUAL STRIP-MCNC: 202 MG/DL (ref 74–99)
GLUCOSE BLD MANUAL STRIP-MCNC: 255 MG/DL (ref 74–99)
GLUCOSE SERPL-MCNC: 211 MG/DL (ref 74–99)
HCT VFR BLD AUTO: 33.9 % (ref 36–46)
HGB BLD-MCNC: 10.4 G/DL (ref 12–16)
HOLD SPECIMEN: 293
LEGIONELLA AG UR QL: NEGATIVE
MAGNESIUM SERPL-MCNC: 1.93 MG/DL (ref 1.6–2.4)
MCH RBC QN AUTO: 27.6 PG (ref 26–34)
MCHC RBC AUTO-ENTMCNC: 30.7 G/DL (ref 32–36)
MCV RBC AUTO: 90 FL (ref 80–100)
NRBC BLD-RTO: 0 /100 WBCS (ref 0–0)
PLATELET # BLD AUTO: 207 X10*3/UL (ref 150–450)
POTASSIUM SERPL-SCNC: 4 MMOL/L (ref 3.5–5.3)
RBC # BLD AUTO: 3.77 X10*6/UL (ref 4–5.2)
S PNEUM AG UR QL: NEGATIVE
SODIUM SERPL-SCNC: 139 MMOL/L (ref 136–145)
WBC # BLD AUTO: 6.9 X10*3/UL (ref 4.4–11.3)

## 2025-05-05 PROCEDURE — 2500000002 HC RX 250 W HCPCS SELF ADMINISTERED DRUGS (ALT 637 FOR MEDICARE OP, ALT 636 FOR OP/ED): Performed by: NURSE PRACTITIONER

## 2025-05-05 PROCEDURE — 2500000005 HC RX 250 GENERAL PHARMACY W/O HCPCS: Performed by: NURSE PRACTITIONER

## 2025-05-05 PROCEDURE — 83735 ASSAY OF MAGNESIUM: CPT | Performed by: NURSE PRACTITIONER

## 2025-05-05 PROCEDURE — 2500000004 HC RX 250 GENERAL PHARMACY W/ HCPCS (ALT 636 FOR OP/ED): Mod: JZ | Performed by: NURSE PRACTITIONER

## 2025-05-05 PROCEDURE — 85027 COMPLETE CBC AUTOMATED: CPT | Performed by: NURSE PRACTITIONER

## 2025-05-05 PROCEDURE — 36415 COLL VENOUS BLD VENIPUNCTURE: CPT | Performed by: NURSE PRACTITIONER

## 2025-05-05 PROCEDURE — 80048 BASIC METABOLIC PNL TOTAL CA: CPT | Performed by: NURSE PRACTITIONER

## 2025-05-05 PROCEDURE — 76770 US EXAM ABDO BACK WALL COMP: CPT

## 2025-05-05 PROCEDURE — 2500000001 HC RX 250 WO HCPCS SELF ADMINISTERED DRUGS (ALT 637 FOR MEDICARE OP): Performed by: NURSE PRACTITIONER

## 2025-05-05 PROCEDURE — 2060000001 HC INTERMEDIATE ICU ROOM DAILY

## 2025-05-05 PROCEDURE — 2500000004 HC RX 250 GENERAL PHARMACY W/ HCPCS (ALT 636 FOR OP/ED): Mod: JZ | Performed by: INTERNAL MEDICINE

## 2025-05-05 PROCEDURE — 82947 ASSAY GLUCOSE BLOOD QUANT: CPT

## 2025-05-05 RX ORDER — PANTOPRAZOLE SODIUM 40 MG/1
40 TABLET, DELAYED RELEASE ORAL
Status: DISCONTINUED | OUTPATIENT
Start: 2025-05-06 | End: 2025-05-09 | Stop reason: HOSPADM

## 2025-05-05 RX ADMIN — Medication 1 SPRAY: at 20:59

## 2025-05-05 RX ADMIN — LEVOTHYROXINE SODIUM 75 MCG: 0.07 TABLET ORAL at 05:37

## 2025-05-05 RX ADMIN — BUSPIRONE HYDROCHLORIDE 10 MG: 10 TABLET ORAL at 09:34

## 2025-05-05 RX ADMIN — AMITRIPTYLINE HYDROCHLORIDE 10 MG: 10 TABLET, FILM COATED ORAL at 20:57

## 2025-05-05 RX ADMIN — Medication 10 ML: at 05:23

## 2025-05-05 RX ADMIN — FLUCONAZOLE 400 MG: 2 INJECTION, SOLUTION INTRAVENOUS at 13:02

## 2025-05-05 RX ADMIN — MELATONIN TAB 3 MG 3 MG: 3 TAB at 20:57

## 2025-05-05 RX ADMIN — NYSTATIN 1 APPLICATION: 100000 POWDER TOPICAL at 09:26

## 2025-05-05 RX ADMIN — PREGABALIN 50 MG: 50 CAPSULE ORAL at 20:56

## 2025-05-05 RX ADMIN — PIPERACILLIN SODIUM AND TAZOBACTAM SODIUM 3.38 G: 3; .375 INJECTION, SOLUTION INTRAVENOUS at 06:41

## 2025-05-05 RX ADMIN — Medication 1 SPRAY: at 09:26

## 2025-05-05 RX ADMIN — Medication 1 TABLET: at 09:34

## 2025-05-05 RX ADMIN — ENOXAPARIN SODIUM 40 MG: 40 INJECTION SUBCUTANEOUS at 09:29

## 2025-05-05 RX ADMIN — Medication 1 TABLET: at 20:56

## 2025-05-05 RX ADMIN — Medication 1 SPRAY: at 05:23

## 2025-05-05 RX ADMIN — Medication 10 ML: at 21:00

## 2025-05-05 RX ADMIN — Medication 10 ML: at 14:00

## 2025-05-05 RX ADMIN — Medication: at 09:26

## 2025-05-05 RX ADMIN — INSULIN GLARGINE 10 UNITS: 100 INJECTION, SOLUTION SUBCUTANEOUS at 09:27

## 2025-05-05 RX ADMIN — METOPROLOL SUCCINATE 25 MG: 25 TABLET, EXTENDED RELEASE ORAL at 09:35

## 2025-05-05 RX ADMIN — VENLAFAXINE 50 MG: 25 TABLET ORAL at 20:57

## 2025-05-05 RX ADMIN — ATORVASTATIN CALCIUM 10 MG: 10 TABLET, FILM COATED ORAL at 20:57

## 2025-05-05 RX ADMIN — Medication 1 SPRAY: at 13:02

## 2025-05-05 RX ADMIN — BUSPIRONE HYDROCHLORIDE 10 MG: 10 TABLET ORAL at 20:57

## 2025-05-05 RX ADMIN — INSULIN LISPRO 1 UNITS: 100 INJECTION, SOLUTION INTRAVENOUS; SUBCUTANEOUS at 09:28

## 2025-05-05 RX ADMIN — INSULIN LISPRO 1 UNITS: 100 INJECTION, SOLUTION INTRAVENOUS; SUBCUTANEOUS at 18:21

## 2025-05-05 RX ADMIN — INSULIN LISPRO 3 UNITS: 100 INJECTION, SOLUTION INTRAVENOUS; SUBCUTANEOUS at 13:03

## 2025-05-05 RX ADMIN — PREGABALIN 50 MG: 50 CAPSULE ORAL at 09:34

## 2025-05-05 RX ADMIN — VANCOMYCIN HYDROCHLORIDE 1000 MG: 1 INJECTION, SOLUTION INTRAVENOUS at 05:22

## 2025-05-05 RX ADMIN — Medication: at 20:59

## 2025-05-05 RX ADMIN — ACETAMINOPHEN 650 MG: 325 TABLET ORAL at 18:20

## 2025-05-05 RX ADMIN — NYSTATIN 1 APPLICATION: 100000 POWDER TOPICAL at 20:59

## 2025-05-05 RX ADMIN — PANTOPRAZOLE SODIUM 40 MG: 40 INJECTION, POWDER, FOR SOLUTION INTRAVENOUS at 09:29

## 2025-05-05 RX ADMIN — ACETAMINOPHEN 650 MG: 325 TABLET ORAL at 09:50

## 2025-05-05 ASSESSMENT — PAIN DESCRIPTION - ORIENTATION
ORIENTATION: RIGHT
ORIENTATION: RIGHT

## 2025-05-05 ASSESSMENT — PAIN SCALES - GENERAL
PAINLEVEL_OUTOF10: 2
PAINLEVEL_OUTOF10: 0 - NO PAIN
PAINLEVEL_OUTOF10: 0 - NO PAIN
PAINLEVEL_OUTOF10: 9
PAINLEVEL_OUTOF10: 8
PAINLEVEL_OUTOF10: 8
PAINLEVEL_OUTOF10: 0 - NO PAIN
PAINLEVEL_OUTOF10: 2

## 2025-05-05 ASSESSMENT — PAIN DESCRIPTION - DESCRIPTORS: DESCRIPTORS: ACHING

## 2025-05-05 ASSESSMENT — COGNITIVE AND FUNCTIONAL STATUS - GENERAL
WALKING IN HOSPITAL ROOM: TOTAL
PERSONAL GROOMING: A LOT
CLIMB 3 TO 5 STEPS WITH RAILING: TOTAL
MOVING TO AND FROM BED TO CHAIR: TOTAL
MOBILITY SCORE: 8
WALKING IN HOSPITAL ROOM: TOTAL
TOILETING: A LOT
CLIMB 3 TO 5 STEPS WITH RAILING: TOTAL
MOVING FROM LYING ON BACK TO SITTING ON SIDE OF FLAT BED WITH BEDRAILS: A LOT
DRESSING REGULAR LOWER BODY CLOTHING: A LOT
EATING MEALS: A LITTLE
MOBILITY SCORE: 8
HELP NEEDED FOR BATHING: A LOT
MOVING TO AND FROM BED TO CHAIR: TOTAL
DAILY ACTIVITIY SCORE: 13
STANDING UP FROM CHAIR USING ARMS: TOTAL
STANDING UP FROM CHAIR USING ARMS: TOTAL
DRESSING REGULAR UPPER BODY CLOTHING: A LOT
TURNING FROM BACK TO SIDE WHILE IN FLAT BAD: A LOT
PERSONAL GROOMING: A LOT
TURNING FROM BACK TO SIDE WHILE IN FLAT BAD: A LOT
HELP NEEDED FOR BATHING: A LOT
MOVING FROM LYING ON BACK TO SITTING ON SIDE OF FLAT BED WITH BEDRAILS: A LOT
EATING MEALS: A LITTLE
TOILETING: A LOT
DRESSING REGULAR UPPER BODY CLOTHING: A LOT
DRESSING REGULAR LOWER BODY CLOTHING: A LOT
DAILY ACTIVITIY SCORE: 13

## 2025-05-05 ASSESSMENT — PAIN - FUNCTIONAL ASSESSMENT
PAIN_FUNCTIONAL_ASSESSMENT: 0-10

## 2025-05-05 ASSESSMENT — PAIN DESCRIPTION - LOCATION
LOCATION: HIP
LOCATION: GENERALIZED

## 2025-05-05 NOTE — PROGRESS NOTES
" INPATIENT PROGRESS NOTES    PATIENT NAME: Lisa Vu    MRN: [id number]  SERVICE DATE:  5/5/2025   SERVICE TIME:  2:54 PM    SIGNATURE: Clive López MD      ASSESSMENT :   -Fungemia,   -Right pyelonephritis  -Right hydronephrosis  -Acute encephalopathy  -Positive blood culture for staph capitis likely contamination  -Leukocytosis  -69 y/o female with CKD 2, dementia T2DM, recurrent UTI.     PLAN:  - Continue fluconazole for 14 days  - Discontinue vancomycin and Zosyn  - Follow-up repeat blood culture  - Closely monitor for antibiotics side effects including rash, Diarrhea/CDI, thrombocytopenia, NAYELY, etc.        SUBJECTIVE  Afebrile  No events overnight       OBJECTIVE  PHYSICAL EXAM:   Patient Vitals for the past 24 hrs:   BP Temp Temp src Pulse Resp SpO2 Weight   05/05/25 1200 -- -- -- 60 -- -- --   05/05/25 0815 155/73 36.8 °C (98.2 °F) Temporal 62 20 98 % --   05/05/25 0800 -- -- -- 58 17 -- --   05/05/25 0430 153/65 36.5 °C (97.7 °F) Temporal 55 14 98 % 96.4 kg (212 lb 8.4 oz)   05/05/25 0400 -- -- -- 56 18 -- --   05/05/25 0030 160/72 35.9 °C (96.6 °F) Temporal 54 14 100 % --   05/05/25 0000 -- -- -- 54 14 -- --   05/04/25 2120 166/72 35.9 °C (96.6 °F) Temporal 50 21 100 % --   05/04/25 2000 170/76 36 °C (96.8 °F) Temporal 50 15 99 % --   05/04/25 1621 111/59 36 °C (96.8 °F) Temporal (!) 48 14 97 % --   05/04/25 1600 -- -- -- (!) 48 14 -- --         Gen: NAD  Neck: symmetric, no mass  Cardiovascular: RRR  Respiratory: No distress       Labs:  Lab Results   Component Value Date    WBC 6.9 05/05/2025    HGB 10.4 (L) 05/05/2025    HCT 33.9 (L) 05/05/2025    MCV 90 05/05/2025     05/05/2025     Lab Results   Component Value Date    GLUCOSE 211 (H) 05/05/2025    CALCIUM 8.1 (L) 05/05/2025     05/05/2025    K 4.0 05/05/2025    CO2 26 05/05/2025     05/05/2025    BUN 13 05/05/2025    CREATININE 0.84 05/05/2025   ESR: --No results found for: \"SEDRATE\"No results found for: \"CRP\"  Lab Results " "  Component Value Date    ALT 9 05/02/2025    AST 13 05/02/2025    ALKPHOS 70 05/02/2025    BILITOT 0.7 05/02/2025         DATA:   Diagnostic tests reviewed for today's visit:    Labs this admission reviewed  Imagings this admission reviewed  Cultures: Reviewed        Clive López MD.   Infectious Disease Attending            This note was partially created using voice recognition software and is inherently subject to errors including those of syntax and \"sound-alike\" substitutions which may escape proofreading. In such instances, original meaning may be extrapolated by contextual derivation       "

## 2025-05-05 NOTE — PROGRESS NOTES
Physical Therapy                       Therapy Communication Note    Patient Name: Lisa Vu  MRN: [id number]  Today's Date: 5/5/2025     Discipline: Physical Therapy    Missed Visit Reason: PT order received, chart reviewed. Spoke with pt. Pt states she has not been working with therapy at [facility] and is using a deysi lift for mobility. No skilled PT needs at this time; will sign off.     Missed Time: Attempt

## 2025-05-05 NOTE — PROGRESS NOTES
"Lisa Vu is a 64 y.o. female on day 3 of admission presenting with Toxic metabolic encephalopathy, noted to have fungemia, is slowly stabilized    Subjective   No CP       Objective   Lying in bed, no acute    Current Medications[1]       Physical Exam  Constitutional:       Comments: No acute distress   HENT:      Head: Normocephalic.   Eyes:      Conjunctiva/sclera: Conjunctivae normal.   Cardiovascular:      Rate and Rhythm: Regular rhythm.      Heart sounds: Normal heart sounds.   Pulmonary:      Breath sounds: Normal breath sounds.   Abdominal:      General: Bowel sounds are normal.      Palpations: Abdomen is soft.   Musculoskeletal:      Comments: No edema   Skin:     General: Skin is warm.   Neurological:      Mental Status: Mental status is at baseline.           Last Recorded Vitals  Blood pressure 155/73, pulse 62, temperature 36.8 °C (98.2 °F), temperature source Temporal, resp. rate 20, height 1.727 m (5' 8\"), weight 96.4 kg (212 lb 8.4 oz), SpO2 98%.  Intake/Output last 3 Shifts:  I/O last 3 completed shifts:  In: 5640 (58.5 mL/kg) [P.O.:4130; I.V.:10 (0.1 mL/kg); IV Piggyback:1500]  Out: 4800 (49.8 mL/kg) [Urine:4800 (1.4 mL/kg/hr)]  Weight: 96.4 kg           Labs:       Results for orders placed or performed during the hospital encounter of 05/02/25 (from the past 24 hours)   POCT GLUCOSE   Result Value Ref Range    POCT Glucose 120 (H) 74 - 99 mg/dL   POCT GLUCOSE   Result Value Ref Range    POCT Glucose 135 (H) 74 - 99 mg/dL   POCT GLUCOSE   Result Value Ref Range    POCT Glucose 130 (H) 74 - 99 mg/dL   Urinalysis with Reflex Culture and Microscopic   Result Value Ref Range    Color, Urine Yellow Light-Yellow, Yellow, Dark-Yellow    Appearance, Urine Turbid (N) Clear    Specific Gravity, Urine 1.010 1.005 - 1.035    pH, Urine 5.5 5.0, 5.5, 6.0, 6.5, 7.0, 7.5, 8.0    Protein, Urine NEGATIVE NEGATIVE, 10 (TRACE), 20 (TRACE) mg/dL    Glucose, Urine 1000 (4+) (A) NEGATIVE mg/dL    Blood, Urine " 0.06 (1+) (A) NEGATIVE mg/dL    Ketones, Urine NEGATIVE NEGATIVE mg/dL    Bilirubin, Urine NEGATIVE NEGATIVE mg/dL    Urobilinogen, Urine 2 (1+) (A) Normal mg/dL    Nitrite, Urine NEGATIVE NEGATIVE    Leukocyte Esterase, Urine 500 Robert/uL (A) NEGATIVE   Extra Urine Gray Tube   Result Value Ref Range    Extra Tube 293    Microscopic Only, Urine   Result Value Ref Range    WBC, Urine >50 (A) 1-5, NONE /HPF    WBC Clumps, Urine MODERATE Reference range not established. /HPF    RBC, Urine >20 (A) NONE, 1-2, 3-5 /HPF    Budding Yeast, Urine PRESENT (A) NONE /HPF    Mucus, Urine FEW Reference range not established. /LPF   CBC   Result Value Ref Range    WBC 6.9 4.4 - 11.3 x10*3/uL    nRBC 0.0 0.0 - 0.0 /100 WBCs    RBC 3.77 (L) 4.00 - 5.20 x10*6/uL    Hemoglobin 10.4 (L) 12.0 - 16.0 g/dL    Hematocrit 33.9 (L) 36.0 - 46.0 %    MCV 90 80 - 100 fL    MCH 27.6 26.0 - 34.0 pg    MCHC 30.7 (L) 32.0 - 36.0 g/dL    RDW 16.3 (H) 11.5 - 14.5 %    Platelets 207 150 - 450 x10*3/uL   Basic metabolic panel   Result Value Ref Range    Glucose 211 (H) 74 - 99 mg/dL    Sodium 139 136 - 145 mmol/L    Potassium 4.0 3.5 - 5.3 mmol/L    Chloride 106 98 - 107 mmol/L    Bicarbonate 26 21 - 32 mmol/L    Anion Gap 11 10 - 20 mmol/L    Urea Nitrogen 13 6 - 23 mg/dL    Creatinine 0.84 0.50 - 1.05 mg/dL    eGFR 77 >60 mL/min/1.73m*2    Calcium 8.1 (L) 8.6 - 10.3 mg/dL   Magnesium   Result Value Ref Range    Magnesium 1.93 1.60 - 2.40 mg/dL   POCT GLUCOSE   Result Value Ref Range    POCT Glucose 202 (H) 74 - 99 mg/dL        Radiology  CT abdomen pelvis w IV contrast  Result Date: 5/4/2025  1.  Mild right-sided hydroureteronephrosis with diffuse ureteral wall thickening and periureteral fat stranding throughout its course to the level of vesicoureteral junction. These findings raise concern for infectious/inflammatory ureteritis. No obvious distal obstructing ureteral calculus is noted on the CT examination. There is also diffuse posterior wall  thickening of the urinary bladder extending to level of bilateral vesicoureteral junctions. Constellation of these findings raise concern for cystitis with ascending ureteritis/pyelitis. No significant left-sided ureteral or pelvic dilatation/ureteral inflammation is noted at this point in time. Recommend clinical and laboratory correlation for cystitis. This asymmetric posterior wall thickening of the urinary bladder is most likely favored to be related to cystitis, however other possibilities including neoplastic process can not be completely excluded. Recommend urologic consultation and further evaluation as clinically warranted. 2. Findings suggestive of prior bariatric surgery. There is intraluminal hyperdensity in the distal esophagus extending to the gastric pouch with slow trickle of this intraluminal hyperdensity into the rest of the stomach which connects with the duodenum. These findings could be related to prior surgery (although the type of surgery is unclear). Recommend correlation with type of surgery and patient's symptomatology associated with these findings. 3. Mild diffuse circumferential thickening of the distal thoracic esophagus with suggestion of small hiatal hernia. These findings are concerning for reflux. Recommend correlation with patient's symptomatology. Gastroenterology consultation and further evaluation with upper GI endoscopy can be performed for further evaluation as clinically warranted. 4. Small bilateral pleural effusions. Mild subpleural ground-glass opacities in the dependent region of bilateral lower lobes are likely favored to represent dependent atelectasis, however developing pneumonia can also be considered in the differential in appropriate clinical setting. 5. Nodular thickening of the left adrenal gland with the prominent nodule measuring up to 1.1 cm as described above. Recommend further evaluation with a dedicated adrenal mass protocol.   MACRO: Critical Finding:  See  findings. Notification was initiated on 5/4/2025 at 12:56 pm by Dr. Mercedes Jones.  (**-YCF-**) Instructions:   Signed by: Mercedes Jones 5/4/2025 6:04 PM Dictation workstation:   [id number]    XR abdomen 1 view  Result Date: 5/4/2025  Gaseous distention of small-bowel loops again seen, most likely due to ileus. No evidence of bowel obstruction. Follow-up as needed.   Signed by: Frank Armendariz 5/4/2025 12:41 PM Dictation workstation:   [id number]    XR chest 1 view  Result Date: 5/3/2025  1.  No radiographic evidence of acute cardiopulmonary disease. No focal consolidation or alveolar edema.       MACRO: None   Signed by: Herrera Nelson 5/3/2025 10:07 PM Dictation workstation:   [id number]    XR abdomen 1 view  Result Date: 5/3/2025  Persistent gaseous distention of small-bowel loops, most likely due to ileus. Follow-up as needed.   Signed by: Frank Armendariz 5/3/2025 10:18 AM Dictation workstation:   [id number]    XR abdomen 1 view  Result Date: 5/2/2025  Dilated small bowel loop in the left abdomen measuring 3.7 cm in diameter may be secondary to ileus, however if there is persistent concern for acute abdominal pathology, CT is recommended for further evaluation.   MACRO: None   Signed by: Erik Morton 5/2/2025 8:45 PM Dictation workstation:   [id number]    CT brain attack head wo IV contrast  Result Date: 5/2/2025  No evidence of acute cortical infarct or intracranial hemorrhage. Volume loss. Degree does appear to be advanced for age. If persistent concern, consider MRI   MACRO: Anirudh Gabriel message by epic leti [employer] on 5/2/2025 at 6:09 pm.  (**-RCF-**) Findings:  See findings.     Signed by: Anirudh Gabriel 5/2/2025 6:10 PM Dictation workstation:   [id number]    CT brain attack angio head and neck W and WO IV contrast  Result Date: 5/2/2025  1. CTA of Neck arteries demonstrates no significant flow-limiting stenosis or dissection. 2. CTA of Intracranial arteries demonstrates no evidence for  large vessel occlusion or other acute findings. 3. Multifocal mild atherosclerosis, without evidence for hemodynamically significant stenosis.   MACRO: None   Signed by: Ted Vera 5/2/2025 6:08 PM Dictation workstation:   [id number]        Assessment/Plan   Assessment & Plan  Toxic metabolic encephalopathy     Cystitis with hematuria     Hypovolemia dehydration     Severe sepsis with acute organ dysfunction (Multi)     Fungemia        Hydroureteronephrosis    Hydronephrosis      Right pyelonephritis      AMS, due to metabolic encephalopathy      PLAN: Patient was started on IV fluconazole, urology consult was obtained, remains slightly confused possibly due to above, med list and labs reviewed, discussed with nursing and CNP, for now continue the supportive care, appreciate input of ID and neurology.  Follow closely.            Sushil Orozco MD           [1]   Current Facility-Administered Medications:     acetaminophen (Tylenol) tablet 650 mg, 650 mg, oral, q4h PRN, 650 mg at 05/05/25 0950 **OR** acetaminophen (Tylenol) oral liquid 650 mg, 650 mg, oral, q4h PRN **OR** acetaminophen (Tylenol) suppository 650 mg, 650 mg, rectal, q4h PRN, Lillian Ahumada, APRN-CNP    amitriptyline (Elavil) tablet 10 mg, 10 mg, oral, Nightly, Lillian Ahumada, APRN-CNP, 10 mg at 05/04/25 2145    ammonium lactate (Lac-Hydrin) 12 % lotion, , Topical, BID, Lillian Ahumada, APRN-CNP, Given at 05/05/25 0926    atorvastatin (Lipitor) tablet 10 mg, 10 mg, oral, Nightly, Lillian Ahumada, APRN-CNP, 10 mg at 05/04/25 2146    bisacodyl (Dulcolax) EC tablet 5 mg, 5 mg, oral, Nightly, Lillian Ahumada, APRN-CNP, 5 mg at 05/03/25 2226    bisacodyl (Dulcolax) suppository 10 mg, 10 mg, rectal, Daily, Savita Mckeon, APRN-CNP, 10 mg at 05/03/25 1125    busPIRone (Buspar) tablet 10 mg, 10 mg, oral, BID, Lillian Ahumada APRN-CNP, 10 mg at 05/05/25 0934    dextrose 50 % injection 12.5 g, 12.5 g, intravenous, q15 min PRN, Lillian L  Orleman, APRN-CNP    dextrose 50 % injection 25 g, 25 g, intravenous, q15 min PRN, ANGELINA Lara    [Held by provider] dicyclomine (Bentyl) capsule 20 mg, 20 mg, oral, Before meals & nightly, ANGELINA Lara    [Held by provider] empagliflozin (Jardiance) tablet 25 mg, 25 mg, oral, Daily, ANGELINA Lara    enoxaparin (Lovenox) syringe 40 mg, 40 mg, subcutaneous, q24h, ANGELINA Lara, 40 mg at 05/05/25 0929    fluconazole (Diflucan) 400 mg in sodium chloride (iso)  mL, 400 mg, intravenous, q24h, Pawan Amezcua MD, MS    [Held by provider] glimepiride (Amaryl) tablet 4 mg, 4 mg, oral, BID, ANGELINA Laar, 4 mg at 05/03/25 1842    glucagon (Glucagen) injection 1 mg, 1 mg, intramuscular, q15 min PRN, ANGELINA Lara    glucagon (Glucagen) injection 1 mg, 1 mg, intramuscular, q15 min PRN, ANGELINA Lara    glycerin (Adult) 2 g suppository 1 suppository, 1 suppository, rectal, Daily PRN, ANGELINA Lara    insulin glargine (Lantus) injection 10 Units, 10 Units, subcutaneous, Daily, PEARL Lara-CNP, 10 Units at 05/05/25 0927    insulin lispro injection 0-5 Units, 0-5 Units, subcutaneous, TID AC, PEARL Lara-CNP, 1 Units at 05/05/25 0928    lactobacillus acidophilus tablet 1 tablet, 1 tablet, oral, BID, PEARL Lara-CNP, 1 tablet at 05/05/25 0934    levothyroxine (Synthroid, Levoxyl) tablet 75 mcg, 75 mcg, oral, Daily, PEARL Lara-CNP, 75 mcg at 05/05/25 0537    [Held by provider] lisinopril tablet 20 mg, 20 mg, oral, Daily, ANGELINA Lara, 20 mg at 05/03/25 0922    melatonin tablet 3 mg, 3 mg, oral, Nightly PRN, ANGELINA Lara, 3 mg at 05/03/25 2226    metoprolol succinate XL (Toprol-XL) 24 hr tablet 25 mg, 25 mg, oral, Daily, ANGELINA Lara, 25 mg at 05/05/25 0935    moisturizing mouth (Biotene Oral Dry Mouth) solution 1 spray, 1  spray, oral, Before meals & nightly, PEARL Lara-CNP, 1 spray at 05/05/25 0926    moisturizing mouth (Biotene Oral Dry Mouth) solution 1 spray, 1 spray, oral, PRN, PEARL Lara-CNP, 1 spray at 05/05/25 0523    nystatin (Mycostatin) 100,000 unit/gram powder 1 Application, 1 Application, Topical, BID, PEARL Lara-CNP, 1 Application at 05/05/25 0926    ondansetron ODT (Zofran-ODT) disintegrating tablet 4 mg, 4 mg, oral, q8h PRN **OR** ondansetron (Zofran) injection 4 mg, 4 mg, intravenous, q8h PRN, Lillian Ahumada APRN-CNP    pantoprazole (Protonix) injection 40 mg, 40 mg, intravenous, Daily, PEARL Verduzco-CNP, 40 mg at 05/05/25 0929    piperacillin-tazobactam (Zosyn) 3.375 g in dextrose (iso) IV 50 mL, 3.375 g, intravenous, q8h, PEARL Lara-CNP, Last Rate: 0 mL/hr at 05/05/25 0145, 3.375 g at 05/05/25 0641    polyethylene glycol (Glycolax, Miralax) packet 17 g, 17 g, oral, Daily, PEARL Lara-CNP, 17 g at 05/03/25 0921    pregabalin (Lyrica) capsule 50 mg, 50 mg, oral, BID, Lillian Ahumada APRN-CNP, 50 mg at 05/05/25 0934    sodium chloride 0.9% flush 10 mL, 10 mL, intravenous, q8h BAILEE, Lillian Ahumada APRN-CNP, 10 mL at 05/05/25 0523    vancomycin (Vancocin) 1,000 mg in dextrose 5%  mL, 1,000 mg, intravenous, q12h, PEARL Verduzco-CNP, Stopped at 05/05/25 0620    vancomycin (Vancocin) pharmacy to dose - pharmacy monitoring, , miscellaneous, Daily PRN, Savita Mckeon, APRN-CNP    venlafaxine (Effexor) tablet 50 mg, 50 mg, oral, Nightly, ANGELINA Lara, 50 mg at 05/04/25 2146    white petrolatum (Aquaphor) ointment, , Topical, q1h PRN, PEARL Lara-CNP

## 2025-05-05 NOTE — CARE PLAN
Problem: Pain - Adult  Goal: Verbalizes/displays adequate comfort level or baseline comfort level  Outcome: Progressing     Problem: Safety - Adult  Goal: Free from fall injury  Outcome: Progressing     Problem: Discharge Planning  Goal: Discharge to home or other facility with appropriate resources  Outcome: Progressing     Problem: Chronic Conditions and Co-morbidities  Goal: Patient's chronic conditions and co-morbidity symptoms are monitored and maintained or improved  Outcome: Progressing     Problem: Nutrition  Goal: Nutrient intake appropriate for maintaining nutritional needs  Outcome: Progressing     Problem: Fall/Injury  Goal: Not fall by end of shift  Outcome: Progressing  Goal: Be free from injury by end of the shift  Outcome: Progressing  Goal: Verbalize understanding of personal risk factors for fall in the hospital  Outcome: Progressing  Goal: Verbalize understanding of risk factor reduction measures to prevent injury from fall in the home  Outcome: Progressing  Goal: Pace activities to prevent fatigue by end of the shift  Outcome: Progressing     Problem: Skin  Goal: Decreased wound size/increased tissue granulation at next dressing change  Outcome: Progressing  Goal: Participates in plan/prevention/treatment measures  Outcome: Progressing  Goal: Prevent/manage excess moisture  Outcome: Progressing  Goal: Prevent/minimize sheer/friction injuries  Outcome: Progressing  Goal: Promote/optimize nutrition  Outcome: Progressing  Goal: Promote skin healing  Outcome: Progressing     Problem: Infection prevention/bleeding  Goal: Infection s/sx managed  Outcome: Progressing  Goal: No further progression of infection  Outcome: Progressing  Goal: No signs of bleeding  Outcome: Progressing  Goal: Normal coagulation studies  Outcome: Progressing     Problem: Diabetes  Goal: Maintain glucose levels >70mg/dl to <250mg/dl throughout shift  Outcome: Progressing     Problem: Perfusion/Cardiac  Goal: Adequate  perfusion to organs/extremities  Outcome: Progressing  Goal: Hemodynamically stable  Outcome: Progressing  Goal: No cardiac arrhythmias  Outcome: Progressing     Problem: Respiratory/Oxygenation  Goal: No signs of respiratory compromise  Outcome: Progressing  Goal: Tolerates activity without increased O2 demands  Outcome: Progressing     Problem: Neuro/Coping  Goal: Minimal anxiety; utilize coping mechanisms  Outcome: Progressing  Goal: No signs of neurological compromise  Outcome: Progressing  Goal: Increase self care/family involvement  Outcome: Progressing     Problem: Fluid/Electrolyte/Nutrition  Goal: Fluid balance within 1 liter of normovolemia  Outcome: Progressing  Goal: No signs of renal failure  Outcome: Progressing  Goal: Normal electrolyte levels  Outcome: Progressing  Goal: Normal glucose levels  Outcome: Progressing  Goal: Tolerates nutritional intake  Outcome: Progressing   The patient's goals for the shift include      The clinical goals for the shift include spo2 will remain 92% or greater t/o shift

## 2025-05-05 NOTE — PROGRESS NOTES
Vancomycin Dosing by Pharmacy- Cessation of Therapy    Consult to pharmacy for vancomycin dosing has been discontinued by the prescriber, pharmacy will sign off at this time.    Please call pharmacy if there are further questions or re-enter a consult if vancomycin is resumed.     Gianni Wilson, PharmD

## 2025-05-05 NOTE — NURSING NOTE
THE PT IS ALERT AND ORIENTED X2-3; SOMETIMES DEFICIENT ON TIME OF DAY. VERY ALERT UPON CONTACT.  CONVERSATION IS MORE APPROPRIATE TODAY. THE PT IS MORE INTERACTIVE. FOLLOWS COMMANDS. VOICING HUNGER.  BS  TONITE. APPARENTLY HAD ENORMOUS  AMTS OF STOOL INCONTINENCE TODAY  FROM 5PM TO 7PM . HER PERINEAL RASH IS BRIGHT RED AND TENDER TO PERFORM HYGENIC MEASURES. POWDERS AND CREAMS ARE FREQ BEING RE- APPLIED. THE PTS PURWICK CATHETER IS DRAINING WELL BUT SHE HAS ALSO HAD LG INCONTINENCES OF URINE IN ADDITION. PO INTAKE OF LQ TAKEN WELL. WITHOUT NAUSEA OR ABD PAIN VOICED. CONSULTED WITH MED HOUSE. BS +. A DIET ORDER WAS PLACED. SHE ATE A SANDWICH AND SOUP WITHOUT DIFFICULTY. NO SIGNS OF COUGHING OR CHOKING WERE OBSERVED. ASPIR PREC WERE  MAINTAINED. THE PT IS MOVING BETTER TONITE AS WELL. ABLE TO TURN X1 ASSIST. CONTINUES TO HAVE PAIN IN THAT RT HIP WITH MOVEMENT BUT SUBSIDES WITH REST. THE PT STATED SHE  PREV. HAD FALLEN  AT THE FACILITY ON HER RT HIP. SHE HAD DIFFICULTY BENDING HER KNEES. HAS A VERY WEAK PUSH/PULL IN BILAT FEET. VOICES NEUROPATHY X4 EXT BUT SENSES TOUCH EASILY. SHE IS PURPOSEFUL IN BILAT ARMS. THE PTS RESPS HAVE BEEN EVEN AND UNLABORED ON RA. O2 SATS HAVE BEEN > 97% ON RA. THE PTS VSS. TEMP AFEBRILE. EKG- SB IN SLEEP. THE LOWEST HR NOTED WAS 48-55 BTS/MIN WHEN ASLEEP; 60-70 BTS/MIN WHEN AWAKE. THE PT DENIES ANY CP, FEELING SOB OR DIZZINESS. APPEARS IN GOOD SPIRITS., ABLE TO VOICE NEEDS EASILY. PT SAFETY MAINTAINED.  0500- THE PT HAS SLEPT WELL TONITE EXCEPT FOR BEING CHANGED DUE TO URINARY INCONTINENCE. HOWEVER AT THIS TIME WOKE UP REFUSING LAB FOR AM LABWORK. ALSO REFUSED HER SCDS AND DEMANDED THEM OFF. THE PT STATED SHE DIDN'T CARE ABOUT ANY EXPLAINATIONS FOR RATIONALES.  STATED SHE JUST WANTED TO BE LEFT ALONE TO SLEEP. MED HOUSE WAS MADE AWARE.  0700- THE PTS BS  THIS AM. I WAS CALLED INTO THE ROOM. THE PT APOLOGIZED FOR HER EARLIER BEHAVIOR. STATED SHE WAS SOUND ASLEEP AND WASN'T  AWARE OF WHAT SHE WAS DOING OR SAYING.  GAVE PERMISSION TO GET HER AM LABS DRAWN AND LAB WAS CALLED. THE PT IS ALSO ORIENTED X3 AT THIS TIME AND COOPERATIVE IN TAKING HER MEDICATIONS. ANXIOUS FOR BREAKFAST.  APPEARS W/O SIGNS OF DISTRESS.

## 2025-05-05 NOTE — CARE PLAN
The patient's goals for the shift include  GET SOME FOOD, IM HUNGRY    The clinical goals for the shift include THE PT WILL BE HDS AND HAVE NO ORGAN FAILURE FROM  DOCUMENTED SEPSIS.

## 2025-05-05 NOTE — PROGRESS NOTES
Occupational Therapy                 Therapy Communication Note    Patient Name: Lisa Vu  MRN: [id number]  Department: [facility]  Room: 2105/2105-A  Today's Date: 5/5/2025     Discipline: Occupational Therapy       Missed Time: Attempt    Comment:  OT orders received. Chart reviewed.  Patient reports being a deysi lift transfer at facility.  Has assist for all self care.  No skilled therapy needs. Will sign off.

## 2025-05-06 LAB
ANION GAP SERPL CALC-SCNC: 12 MMOL/L (ref 10–20)
BACTERIA UR CULT: NO GROWTH
BUN SERPL-MCNC: 11 MG/DL (ref 6–23)
CALCIUM SERPL-MCNC: 8.4 MG/DL (ref 8.6–10.3)
CHLORIDE SERPL-SCNC: 105 MMOL/L (ref 98–107)
CO2 SERPL-SCNC: 26 MMOL/L (ref 21–32)
CREAT SERPL-MCNC: 0.67 MG/DL (ref 0.5–1.05)
EGFRCR SERPLBLD CKD-EPI 2021: >90 ML/MIN/1.73M*2
ERYTHROCYTE [DISTWIDTH] IN BLOOD BY AUTOMATED COUNT: 16 % (ref 11.5–14.5)
GLUCOSE BLD MANUAL STRIP-MCNC: 103 MG/DL (ref 74–99)
GLUCOSE BLD MANUAL STRIP-MCNC: 185 MG/DL (ref 74–99)
GLUCOSE BLD MANUAL STRIP-MCNC: 193 MG/DL (ref 74–99)
GLUCOSE BLD MANUAL STRIP-MCNC: 201 MG/DL (ref 74–99)
GLUCOSE SERPL-MCNC: 104 MG/DL (ref 74–99)
HCT VFR BLD AUTO: 34.6 % (ref 36–46)
HGB BLD-MCNC: 10.8 G/DL (ref 12–16)
MAGNESIUM SERPL-MCNC: 1.8 MG/DL (ref 1.6–2.4)
MCH RBC QN AUTO: 26.9 PG (ref 26–34)
MCHC RBC AUTO-ENTMCNC: 31.2 G/DL (ref 32–36)
MCV RBC AUTO: 86 FL (ref 80–100)
NRBC BLD-RTO: 0 /100 WBCS (ref 0–0)
PLATELET # BLD AUTO: 237 X10*3/UL (ref 150–450)
POTASSIUM SERPL-SCNC: 3.6 MMOL/L (ref 3.5–5.3)
RBC # BLD AUTO: 4.01 X10*6/UL (ref 4–5.2)
SODIUM SERPL-SCNC: 139 MMOL/L (ref 136–145)
WBC # BLD AUTO: 7.6 X10*3/UL (ref 4.4–11.3)

## 2025-05-06 PROCEDURE — 2500000004 HC RX 250 GENERAL PHARMACY W/ HCPCS (ALT 636 FOR OP/ED): Mod: JZ | Performed by: NURSE PRACTITIONER

## 2025-05-06 PROCEDURE — 85027 COMPLETE CBC AUTOMATED: CPT | Performed by: NURSE PRACTITIONER

## 2025-05-06 PROCEDURE — 2500000002 HC RX 250 W HCPCS SELF ADMINISTERED DRUGS (ALT 637 FOR MEDICARE OP, ALT 636 FOR OP/ED): Performed by: NURSE PRACTITIONER

## 2025-05-06 PROCEDURE — 2500000001 HC RX 250 WO HCPCS SELF ADMINISTERED DRUGS (ALT 637 FOR MEDICARE OP): Performed by: NURSE PRACTITIONER

## 2025-05-06 PROCEDURE — 2500000001 HC RX 250 WO HCPCS SELF ADMINISTERED DRUGS (ALT 637 FOR MEDICARE OP): Performed by: PHYSICIAN ASSISTANT

## 2025-05-06 PROCEDURE — 2500000004 HC RX 250 GENERAL PHARMACY W/ HCPCS (ALT 636 FOR OP/ED): Mod: JZ | Performed by: INTERNAL MEDICINE

## 2025-05-06 PROCEDURE — 80048 BASIC METABOLIC PNL TOTAL CA: CPT | Performed by: NURSE PRACTITIONER

## 2025-05-06 PROCEDURE — 82947 ASSAY GLUCOSE BLOOD QUANT: CPT

## 2025-05-06 PROCEDURE — 1200000002 HC GENERAL ROOM WITH TELEMETRY DAILY

## 2025-05-06 PROCEDURE — 83735 ASSAY OF MAGNESIUM: CPT | Performed by: NURSE PRACTITIONER

## 2025-05-06 PROCEDURE — 36415 COLL VENOUS BLD VENIPUNCTURE: CPT | Performed by: NURSE PRACTITIONER

## 2025-05-06 RX ORDER — POTASSIUM CHLORIDE 20 MEQ/1
20 TABLET, EXTENDED RELEASE ORAL ONCE
Status: COMPLETED | OUTPATIENT
Start: 2025-05-06 | End: 2025-05-06

## 2025-05-06 RX ORDER — FLUCONAZOLE 2 MG/ML
800 INJECTION, SOLUTION INTRAVENOUS EVERY 24 HOURS
Status: DISCONTINUED | OUTPATIENT
Start: 2025-05-06 | End: 2025-05-07

## 2025-05-06 RX ADMIN — AMITRIPTYLINE HYDROCHLORIDE 10 MG: 10 TABLET, FILM COATED ORAL at 22:16

## 2025-05-06 RX ADMIN — Medication 10 ML: at 13:19

## 2025-05-06 RX ADMIN — PREGABALIN 50 MG: 50 CAPSULE ORAL at 22:16

## 2025-05-06 RX ADMIN — BUSPIRONE HYDROCHLORIDE 10 MG: 10 TABLET ORAL at 08:49

## 2025-05-06 RX ADMIN — BISACODYL 5 MG: 5 TABLET, COATED ORAL at 22:17

## 2025-05-06 RX ADMIN — ACETAMINOPHEN 650 MG: 325 TABLET ORAL at 18:38

## 2025-05-06 RX ADMIN — Medication 1 TABLET: at 08:49

## 2025-05-06 RX ADMIN — PREGABALIN 50 MG: 50 CAPSULE ORAL at 08:50

## 2025-05-06 RX ADMIN — INSULIN GLARGINE 10 UNITS: 100 INJECTION, SOLUTION SUBCUTANEOUS at 08:51

## 2025-05-06 RX ADMIN — POTASSIUM CHLORIDE 20 MEQ: 1500 TABLET, EXTENDED RELEASE ORAL at 11:42

## 2025-05-06 RX ADMIN — ACETAMINOPHEN 650 MG: 325 TABLET ORAL at 22:33

## 2025-05-06 RX ADMIN — FLUCONAZOLE 400 MG: 2 INJECTION, SOLUTION INTRAVENOUS at 15:16

## 2025-05-06 RX ADMIN — Medication 1 SPRAY: at 11:42

## 2025-05-06 RX ADMIN — ACETAMINOPHEN 650 MG: 325 TABLET ORAL at 09:02

## 2025-05-06 RX ADMIN — NYSTATIN 1 APPLICATION: 100000 POWDER TOPICAL at 22:17

## 2025-05-06 RX ADMIN — Medication: at 08:51

## 2025-05-06 RX ADMIN — POLYETHYLENE GLYCOL 3350 17 G: 17 POWDER, FOR SOLUTION ORAL at 08:50

## 2025-05-06 RX ADMIN — INSULIN LISPRO 1 UNITS: 100 INJECTION, SOLUTION INTRAVENOUS; SUBCUTANEOUS at 13:39

## 2025-05-06 RX ADMIN — Medication 1 SPRAY: at 08:50

## 2025-05-06 RX ADMIN — Medication 1 TABLET: at 22:16

## 2025-05-06 RX ADMIN — Medication 1 SPRAY: at 16:34

## 2025-05-06 RX ADMIN — ENOXAPARIN SODIUM 40 MG: 40 INJECTION SUBCUTANEOUS at 08:49

## 2025-05-06 RX ADMIN — PANTOPRAZOLE SODIUM 40 MG: 40 TABLET, DELAYED RELEASE ORAL at 06:04

## 2025-05-06 RX ADMIN — Medication 10 ML: at 22:20

## 2025-05-06 RX ADMIN — LEVOTHYROXINE SODIUM 75 MCG: 0.07 TABLET ORAL at 06:04

## 2025-05-06 RX ADMIN — INSULIN LISPRO 2 UNITS: 100 INJECTION, SOLUTION INTRAVENOUS; SUBCUTANEOUS at 16:33

## 2025-05-06 RX ADMIN — Medication 10 ML: at 06:07

## 2025-05-06 RX ADMIN — ACETAMINOPHEN 650 MG: 325 TABLET ORAL at 03:26

## 2025-05-06 RX ADMIN — Medication: at 22:32

## 2025-05-06 RX ADMIN — NYSTATIN 1 APPLICATION: 100000 POWDER TOPICAL at 08:50

## 2025-05-06 RX ADMIN — BUSPIRONE HYDROCHLORIDE 10 MG: 10 TABLET ORAL at 22:16

## 2025-05-06 RX ADMIN — ATORVASTATIN CALCIUM 10 MG: 10 TABLET, FILM COATED ORAL at 22:17

## 2025-05-06 RX ADMIN — FLUCONAZOLE 400 MG: 2 INJECTION, SOLUTION INTRAVENOUS at 13:39

## 2025-05-06 RX ADMIN — Medication 1 SPRAY: at 22:16

## 2025-05-06 RX ADMIN — VENLAFAXINE 50 MG: 25 TABLET ORAL at 22:16

## 2025-05-06 RX ADMIN — LISINOPRIL 20 MG: 20 TABLET ORAL at 11:43

## 2025-05-06 ASSESSMENT — PAIN - FUNCTIONAL ASSESSMENT
PAIN_FUNCTIONAL_ASSESSMENT: 0-10

## 2025-05-06 ASSESSMENT — COGNITIVE AND FUNCTIONAL STATUS - GENERAL
EATING MEALS: A LITTLE
WALKING IN HOSPITAL ROOM: TOTAL
DRESSING REGULAR UPPER BODY CLOTHING: TOTAL
TOILETING: TOTAL
MOVING TO AND FROM BED TO CHAIR: TOTAL
DRESSING REGULAR LOWER BODY CLOTHING: TOTAL
PERSONAL GROOMING: A LOT
MOBILITY SCORE: 8
MOVING FROM LYING ON BACK TO SITTING ON SIDE OF FLAT BED WITH BEDRAILS: A LOT
STANDING UP FROM CHAIR USING ARMS: TOTAL
DRESSING REGULAR UPPER BODY CLOTHING: TOTAL
CLIMB 3 TO 5 STEPS WITH RAILING: TOTAL
TURNING FROM BACK TO SIDE WHILE IN FLAT BAD: A LOT
MOBILITY SCORE: 8
EATING MEALS: A LITTLE
DRESSING REGULAR LOWER BODY CLOTHING: TOTAL
STANDING UP FROM CHAIR USING ARMS: TOTAL
MOVING FROM LYING ON BACK TO SITTING ON SIDE OF FLAT BED WITH BEDRAILS: A LOT
TOILETING: TOTAL
HELP NEEDED FOR BATHING: TOTAL
PERSONAL GROOMING: A LOT
DAILY ACTIVITIY SCORE: 9
DAILY ACTIVITIY SCORE: 9
HELP NEEDED FOR BATHING: TOTAL
TURNING FROM BACK TO SIDE WHILE IN FLAT BAD: A LOT
CLIMB 3 TO 5 STEPS WITH RAILING: TOTAL
WALKING IN HOSPITAL ROOM: TOTAL
MOVING TO AND FROM BED TO CHAIR: TOTAL

## 2025-05-06 ASSESSMENT — PAIN SCALES - GENERAL
PAINLEVEL_OUTOF10: 0 - NO PAIN
PAINLEVEL_OUTOF10: 9
PAINLEVEL_OUTOF10: 0 - NO PAIN
PAINLEVEL_OUTOF10: 4
PAINLEVEL_OUTOF10: 0 - NO PAIN
PAINLEVEL_OUTOF10: 0 - NO PAIN
PAINLEVEL_OUTOF10: 7
PAINLEVEL_OUTOF10: 7
PAINLEVEL_OUTOF10: 9
PAINLEVEL_OUTOF10: 7

## 2025-05-06 ASSESSMENT — PAIN DESCRIPTION - LOCATION
LOCATION: LEG
LOCATION: HIP

## 2025-05-06 ASSESSMENT — PAIN DESCRIPTION - ORIENTATION
ORIENTATION: RIGHT
ORIENTATION: LEFT

## 2025-05-06 NOTE — CARE PLAN
The patient's goals for the shift include      The clinical goals for the shift include Pt will be HDS throughout this shift      Problem: Pain - Adult  Goal: Verbalizes/displays adequate comfort level or baseline comfort level  Outcome: Progressing     Problem: Safety - Adult  Goal: Free from fall injury  Outcome: Progressing     Problem: Discharge Planning  Goal: Discharge to home or other facility with appropriate resources  Outcome: Progressing     Problem: Chronic Conditions and Co-morbidities  Goal: Patient's chronic conditions and co-morbidity symptoms are monitored and maintained or improved  Outcome: Progressing     Problem: Nutrition  Goal: Nutrient intake appropriate for maintaining nutritional needs  Outcome: Progressing     Problem: Fall/Injury  Goal: Not fall by end of shift  Outcome: Progressing  Goal: Be free from injury by end of the shift  Outcome: Progressing  Goal: Verbalize understanding of personal risk factors for fall in the hospital  Outcome: Progressing  Goal: Verbalize understanding of risk factor reduction measures to prevent injury from fall in the home  Outcome: Progressing  Goal: Pace activities to prevent fatigue by end of the shift  Outcome: Progressing     Problem: Skin  Goal: Decreased wound size/increased tissue granulation at next dressing change  Outcome: Progressing  Goal: Participates in plan/prevention/treatment measures  Outcome: Progressing  Goal: Prevent/manage excess moisture  Outcome: Progressing  Goal: Prevent/minimize sheer/friction injuries  Outcome: Progressing  Goal: Promote/optimize nutrition  Outcome: Progressing  Goal: Promote skin healing  Outcome: Progressing     Problem: Infection prevention/bleeding  Goal: Infection s/sx managed  Outcome: Progressing  Goal: No further progression of infection  Outcome: Progressing  Goal: No signs of bleeding  Outcome: Progressing  Goal: Normal coagulation studies  Outcome: Progressing     Problem: Diabetes  Goal: Maintain  glucose levels >70mg/dl to <250mg/dl throughout shift  Outcome: Progressing     Problem: Perfusion/Cardiac  Goal: Adequate perfusion to organs/extremities  Outcome: Progressing  Goal: Hemodynamically stable  Outcome: Progressing  Goal: No cardiac arrhythmias  Outcome: Progressing     Problem: Respiratory/Oxygenation  Goal: No signs of respiratory compromise  Outcome: Progressing  Goal: Tolerates activity without increased O2 demands  Outcome: Progressing     Problem: Neuro/Coping  Goal: Minimal anxiety; utilize coping mechanisms  Outcome: Progressing  Goal: No signs of neurological compromise  Outcome: Progressing  Goal: Increase self care/family involvement  Outcome: Progressing     Problem: Fluid/Electrolyte/Nutrition  Goal: Fluid balance within 1 liter of normovolemia  Outcome: Progressing  Goal: No signs of renal failure  Outcome: Progressing  Goal: Normal electrolyte levels  Outcome: Progressing  Goal: Normal glucose levels  Outcome: Progressing  Goal: Tolerates nutritional intake  Outcome: Progressing

## 2025-05-06 NOTE — CARE PLAN
The patient's goals for the shift include      The clinical goals for the shift include Pt will be HDS throughout this shift      Problem: Pain - Adult  Goal: Verbalizes/displays adequate comfort level or baseline comfort level  Outcome: Progressing     Problem: Safety - Adult  Goal: Free from fall injury  Outcome: Progressing     Problem: Discharge Planning  Goal: Discharge to home or other facility with appropriate resources  Outcome: Progressing     Problem: Chronic Conditions and Co-morbidities  Goal: Patient's chronic conditions and co-morbidity symptoms are monitored and maintained or improved  Outcome: Progressing     Problem: Nutrition  Goal: Nutrient intake appropriate for maintaining nutritional needs  Outcome: Progressing     Problem: Fall/Injury  Goal: Not fall by end of shift  Outcome: Progressing  Goal: Be free from injury by end of the shift  Outcome: Progressing  Goal: Verbalize understanding of personal risk factors for fall in the hospital  Outcome: Progressing  Goal: Verbalize understanding of risk factor reduction measures to prevent injury from fall in the home  Outcome: Progressing  Goal: Pace activities to prevent fatigue by end of the shift  Outcome: Progressing     Problem: Skin  Goal: Decreased wound size/increased tissue granulation at next dressing change  Outcome: Progressing  Goal: Participates in plan/prevention/treatment measures  Outcome: Progressing  Goal: Prevent/manage excess moisture  Outcome: Progressing  Flowsheets (Taken 5/6/2025 0444)  Prevent/manage excess moisture:   Cleanse incontinence/protect with barrier cream   Moisturize dry skin  Goal: Prevent/minimize sheer/friction injuries  Outcome: Progressing  Goal: Promote/optimize nutrition  Outcome: Progressing  Goal: Promote skin healing  Outcome: Progressing     Problem: Infection prevention/bleeding  Goal: Infection s/sx managed  Outcome: Progressing  Goal: No further progression of infection  Outcome: Progressing  Goal:  No signs of bleeding  Outcome: Progressing  Goal: Normal coagulation studies  Outcome: Progressing     Problem: Diabetes  Goal: Maintain glucose levels >70mg/dl to <250mg/dl throughout shift  Outcome: Progressing     Problem: Perfusion/Cardiac  Goal: Adequate perfusion to organs/extremities  Outcome: Progressing  Goal: Hemodynamically stable  Outcome: Progressing  Goal: No cardiac arrhythmias  Outcome: Progressing     Problem: Respiratory/Oxygenation  Goal: No signs of respiratory compromise  Outcome: Progressing  Goal: Tolerates activity without increased O2 demands  Outcome: Progressing     Problem: Neuro/Coping  Goal: Minimal anxiety; utilize coping mechanisms  Outcome: Progressing  Goal: No signs of neurological compromise  Outcome: Progressing  Goal: Increase self care/family involvement  Outcome: Progressing     Problem: Fluid/Electrolyte/Nutrition  Goal: Fluid balance within 1 liter of normovolemia  Outcome: Progressing  Goal: No signs of renal failure  Outcome: Progressing  Goal: Normal electrolyte levels  Outcome: Progressing  Goal: Normal glucose levels  Outcome: Progressing  Goal: Tolerates nutritional intake  Outcome: Progressing

## 2025-05-06 NOTE — PROGRESS NOTES
"Lisa Vu is a 64 y.o. female on day 4 of admission presenting with Toxic metabolic encephalopathy.    Subjective   Notes/labs reviewed  Seems more awake today       Objective     Physical Exam    Last Recorded Vitals  Blood pressure 179/73, pulse 54, temperature 36 °C (96.8 °F), temperature source Temporal, resp. rate 18, height 1.727 m (5' 8\"), weight 98.3 kg (216 lb 11.4 oz), SpO2 96%.  Intake/Output last 3 Shifts:  I/O last 3 completed shifts:  In: 1910 (19.4 mL/kg) [P.O.:1400; I.V.:10 (0.1 mL/kg); IV Piggyback:500]  Out: 3300 (33.6 mL/kg) [Urine:3300 (0.9 mL/kg/hr)]  Weight: 98.3 kg     Relevant Results  Scheduled medications  Scheduled Medications[1]  Continuous medications  Continuous Medications[2]  PRN medications  PRN Medications[3]    Results for orders placed or performed during the hospital encounter of 05/02/25 (from the past 24 hours)   POCT GLUCOSE   Result Value Ref Range    POCT Glucose 178 (H) 74 - 99 mg/dL   POCT GLUCOSE   Result Value Ref Range    POCT Glucose 201 (H) 74 - 99 mg/dL   CBC   Result Value Ref Range    WBC 7.6 4.4 - 11.3 x10*3/uL    nRBC 0.0 0.0 - 0.0 /100 WBCs    RBC 4.01 4.00 - 5.20 x10*6/uL    Hemoglobin 10.8 (L) 12.0 - 16.0 g/dL    Hematocrit 34.6 (L) 36.0 - 46.0 %    MCV 86 80 - 100 fL    MCH 26.9 26.0 - 34.0 pg    MCHC 31.2 (L) 32.0 - 36.0 g/dL    RDW 16.0 (H) 11.5 - 14.5 %    Platelets 237 150 - 450 x10*3/uL   Basic metabolic panel   Result Value Ref Range    Glucose 104 (H) 74 - 99 mg/dL    Sodium 139 136 - 145 mmol/L    Potassium 3.6 3.5 - 5.3 mmol/L    Chloride 105 98 - 107 mmol/L    Bicarbonate 26 21 - 32 mmol/L    Anion Gap 12 10 - 20 mmol/L    Urea Nitrogen 11 6 - 23 mg/dL    Creatinine 0.67 0.50 - 1.05 mg/dL    eGFR >90 >60 mL/min/1.73m*2    Calcium 8.4 (L) 8.6 - 10.3 mg/dL   Magnesium   Result Value Ref Range    Magnesium 1.80 1.60 - 2.40 mg/dL   POCT GLUCOSE   Result Value Ref Range    POCT Glucose 103 (H) 74 - 99 mg/dL   POCT GLUCOSE   Result Value Ref Range "    POCT Glucose 185 (H) 74 - 99 mg/dL       Assessment & Plan  Toxic metabolic encephalopathy    Cystitis with hematuria    Hypovolemia dehydration    Severe sepsis with acute organ dysfunction (Multi)    Fungemia    Hydroureteronephrosis    Hydronephrosis        ID notes reivewed---labs reasonable  Growing candida glabrata from blood cx; if doesn't improve, may consider changing antifungals but ID on board and increased flucanzole dose.    No need for right jj stent at this time      Juancarlos Becerra MD           [1] amitriptyline, 10 mg, oral, Nightly  ammonium lactate, , Topical, BID  atorvastatin, 10 mg, oral, Nightly  bisacodyl, 5 mg, oral, Nightly  bisacodyl, 10 mg, rectal, Daily  busPIRone, 10 mg, oral, BID  [Held by provider] dicyclomine, 20 mg, oral, Before meals & nightly  [Held by provider] empagliflozin, 25 mg, oral, Daily  enoxaparin, 40 mg, subcutaneous, q24h  fluconazole, 800 mg, intravenous, q24h  [Held by provider] glimepiride, 4 mg, oral, BID  insulin glargine, 10 Units, subcutaneous, Daily  insulin lispro, 0-5 Units, subcutaneous, TID AC  lactobacillus acidophilus, 1 tablet, oral, BID  levothyroxine, 75 mcg, oral, Daily  lisinopril, 20 mg, oral, Daily  metoprolol succinate XL, 25 mg, oral, Daily  moisturizing mouth, 1 spray, oral, Before meals & nightly  nystatin, 1 Application, Topical, BID  pantoprazole, 40 mg, oral, Daily before breakfast  polyethylene glycol, 17 g, oral, Daily  pregabalin, 50 mg, oral, BID  sodium chloride 0.9%, 10 mL, intravenous, q8h BAILEE  venlafaxine, 50 mg, oral, Nightly    [2]    [3] PRN medications: acetaminophen **OR** acetaminophen **OR** acetaminophen, dextrose, dextrose, glucagon, glucagon, glycerin, melatonin, moisturizing mouth, ondansetron ODT **OR** ondansetron, white petrolatum

## 2025-05-06 NOTE — PROGRESS NOTES
INPATIENT PROGRESS NOTES    PATIENT NAME: Lisa Vu    MRN: [id number]  SERVICE DATE:  5/6/2025   SERVICE TIME:  12:12 PM    SIGNATURE: Clive López MD      ASSESSMENT :   -C.glabrata Fungemia. S-Fluc DD  -Right pyelonephritis  -Right hydronephrosis  -Acute encephalopathy  -Positive blood culture for staph capitis likely contamination  -Leukocytosis  -69 y/o female with CKD 2, dementia T2DM, recurrent UTI.     PLAN:  - Increase fluconazole to 800 mg daily. Day# 4/14 days  - Repeat blood culture NGTD  - Closely monitor for ATB side effects including rash, Diarrhea/CDI, thrombocytopenia, NAYELY, etc.  - Will need urology evaluation       SUBJECTIVE  Afebrile  No events overnight       OBJECTIVE  PHYSICAL EXAM:   Patient Vitals for the past 24 hrs:   BP Temp Temp src Pulse Resp SpO2 Weight   05/06/25 1200 -- -- -- 54 -- -- --   05/06/25 1129 179/73 -- -- 52 18 96 % --   05/06/25 0800 -- -- -- 52 -- -- --   05/06/25 0745 170/75 36.8 °C (98.2 °F) Temporal 52 16 96 % --   05/06/25 0600 -- -- -- -- -- -- 98.3 kg (216 lb 11.4 oz)   05/06/25 0415 155/68 36 °C (96.8 °F) Temporal 54 18 97 % --   05/06/25 0400 -- -- -- 54 -- -- --   05/06/25 0010 163/70 35.9 °C (96.6 °F) Temporal 52 16 97 % --   05/06/25 0000 -- -- -- 50 -- -- --   05/05/25 2000 -- -- -- 50 -- -- --   05/05/25 1938 165/72 35.9 °C (96.6 °F) Temporal 50 18 97 % --   05/05/25 1815 170/75 36.3 °C (97.3 °F) Temporal 56 18 96 % --   05/05/25 1600 -- -- -- 54 -- -- --   05/05/25 1233 166/76 36.2 °C (97.2 °F) Temporal 72 18 97 % --         Gen: NAD  Neck: symmetric, no mass  Cardiovascular: RRR  Respiratory: No distress       Labs:  Lab Results   Component Value Date    WBC 7.6 05/06/2025    HGB 10.8 (L) 05/06/2025    HCT 34.6 (L) 05/06/2025    MCV 86 05/06/2025     05/06/2025     Lab Results   Component Value Date    GLUCOSE 104 (H) 05/06/2025    CALCIUM 8.4 (L) 05/06/2025     05/06/2025    K 3.6 05/06/2025    CO2 26 05/06/2025     05/06/2025  "   BUN 11 05/06/2025    CREATININE 0.67 05/06/2025   ESR: --No results found for: \"SEDRATE\"No results found for: \"CRP\"  Lab Results   Component Value Date    ALT 9 05/02/2025    AST 13 05/02/2025    ALKPHOS 70 05/02/2025    BILITOT 0.7 05/02/2025         DATA:   Diagnostic tests reviewed for today's visit:    Labs this admission reviewed  Imagings this admission reviewed  Cultures: Reviewed        Clive López MD.   Infectious Disease Attending            This note was partially created using voice recognition software and is inherently subject to errors including those of syntax and \"sound-alike\" substitutions which may escape proofreading. In such instances, original meaning may be extrapolated by contextual derivation       "

## 2025-05-06 NOTE — PROGRESS NOTES
Spiritual Care Visit  Spiritual Care Request    Reason for Visit:  Routine Visit: Introduction  Continue Visiting: Yes     Request Received From:       Focus of Care:  Visited With: Patient         Refer to :          Spiritual Care Assessment    Spiritual Assessment:                      Care Provided:  Intended Effects: Aligning care plan with patient's values, Build relationship of care and support, Convey a calming presence, Demonstrate caring and concern, Lessen someone's feelings of isolation, Lessen anxiety, Helping someone feel comforted, Mera affirmation, Establish rapport and connectedness, Meaning-making, Preserve dignity and respect, Promote sense of peace    Sense of Community and or Sabianism Affiliation:  Shinto         Addressed Needs/Concerns and/or Alina Through:          Outcome:        Advance Directives:         Spiritual Care Annotation    Annotation:  Really nice visit with patient - she spoke of her pride in her son who is living in [place] and doing very well - she is also a  and  and we spoke about her experience with these things.

## 2025-05-06 NOTE — PROGRESS NOTES
"Lisa Vu is a 64 y.o. female on day 4 of admission presenting with Toxic metabolic encephalopathy, right hydronephrosis, pyelonephritis, AMS due to encephalopathy, noted to have Staph capitis on the blood culture likely contamination, also noted to have fungemia, was evaluated by urology and ID, was started on IV fluconazole.     Subjective   No CP or SOB       Objective   Lying in bed, no acute distress    Current Medications[1]       Physical Exam  Constitutional:       Comments: Awake and alert   HENT:      Head: Normocephalic.   Eyes:      Conjunctiva/sclera: Conjunctivae normal.   Cardiovascular:      Rate and Rhythm: Normal rate and regular rhythm.   Pulmonary:      Breath sounds: Normal breath sounds.   Abdominal:      General: Bowel sounds are normal.      Palpations: Abdomen is soft.   Musculoskeletal:      Comments: No edema   Skin:     General: Skin is warm and dry.   Neurological:      General: No focal deficit present.           Last Recorded Vitals  Blood pressure 170/75, pulse 52, temperature 36.8 °C (98.2 °F), temperature source Temporal, resp. rate 16, height 1.727 m (5' 8\"), weight 98.3 kg (216 lb 11.4 oz), SpO2 96%.  Intake/Output last 3 Shifts:  I/O last 3 completed shifts:  In: 1910 (19.4 mL/kg) [P.O.:1400; I.V.:10 (0.1 mL/kg); IV Piggyback:500]  Out: 3300 (33.6 mL/kg) [Urine:3300 (0.9 mL/kg/hr)]  Weight: 98.3 kg           Labs:       Results for orders placed or performed during the hospital encounter of 05/02/25 (from the past 24 hours)   POCT GLUCOSE   Result Value Ref Range    POCT Glucose 255 (H) 74 - 99 mg/dL   POCT GLUCOSE   Result Value Ref Range    POCT Glucose 178 (H) 74 - 99 mg/dL   POCT GLUCOSE   Result Value Ref Range    POCT Glucose 201 (H) 74 - 99 mg/dL   CBC   Result Value Ref Range    WBC 7.6 4.4 - 11.3 x10*3/uL    nRBC 0.0 0.0 - 0.0 /100 WBCs    RBC 4.01 4.00 - 5.20 x10*6/uL    Hemoglobin 10.8 (L) 12.0 - 16.0 g/dL    Hematocrit 34.6 (L) 36.0 - 46.0 %    MCV 86 80 - 100 " fL    MCH 26.9 26.0 - 34.0 pg    MCHC 31.2 (L) 32.0 - 36.0 g/dL    RDW 16.0 (H) 11.5 - 14.5 %    Platelets 237 150 - 450 x10*3/uL   Basic metabolic panel   Result Value Ref Range    Glucose 104 (H) 74 - 99 mg/dL    Sodium 139 136 - 145 mmol/L    Potassium 3.6 3.5 - 5.3 mmol/L    Chloride 105 98 - 107 mmol/L    Bicarbonate 26 21 - 32 mmol/L    Anion Gap 12 10 - 20 mmol/L    Urea Nitrogen 11 6 - 23 mg/dL    Creatinine 0.67 0.50 - 1.05 mg/dL    eGFR >90 >60 mL/min/1.73m*2    Calcium 8.4 (L) 8.6 - 10.3 mg/dL   Magnesium   Result Value Ref Range    Magnesium 1.80 1.60 - 2.40 mg/dL   POCT GLUCOSE   Result Value Ref Range    POCT Glucose 103 (H) 74 - 99 mg/dL        Radiology  US renal complete  Result Date: 5/5/2025  Hydronephrosis has resolved. No hydronephrosis is noted bilaterally. There is posterior wall thickening verse layering debris along the right UVJ within the urinary bladder. Recommend correlation with patient's urinalysis. Consider cystoscopy.   MACRO: None   Signed by: Shaheen Pinon 5/5/2025 5:22 PM Dictation workstation:   [id number]    CT abdomen pelvis w IV contrast  Result Date: 5/4/2025  1.  Mild right-sided hydroureteronephrosis with diffuse ureteral wall thickening and periureteral fat stranding throughout its course to the level of vesicoureteral junction. These findings raise concern for infectious/inflammatory ureteritis. No obvious distal obstructing ureteral calculus is noted on the CT examination. There is also diffuse posterior wall thickening of the urinary bladder extending to level of bilateral vesicoureteral junctions. Constellation of these findings raise concern for cystitis with ascending ureteritis/pyelitis. No significant left-sided ureteral or pelvic dilatation/ureteral inflammation is noted at this point in time. Recommend clinical and laboratory correlation for cystitis. This asymmetric posterior wall thickening of the urinary bladder is most likely favored to be related to  cystitis, however other possibilities including neoplastic process can not be completely excluded. Recommend urologic consultation and further evaluation as clinically warranted. 2. Findings suggestive of prior bariatric surgery. There is intraluminal hyperdensity in the distal esophagus extending to the gastric pouch with slow trickle of this intraluminal hyperdensity into the rest of the stomach which connects with the duodenum. These findings could be related to prior surgery (although the type of surgery is unclear). Recommend correlation with type of surgery and patient's symptomatology associated with these findings. 3. Mild diffuse circumferential thickening of the distal thoracic esophagus with suggestion of small hiatal hernia. These findings are concerning for reflux. Recommend correlation with patient's symptomatology. Gastroenterology consultation and further evaluation with upper GI endoscopy can be performed for further evaluation as clinically warranted. 4. Small bilateral pleural effusions. Mild subpleural ground-glass opacities in the dependent region of bilateral lower lobes are likely favored to represent dependent atelectasis, however developing pneumonia can also be considered in the differential in appropriate clinical setting. 5. Nodular thickening of the left adrenal gland with the prominent nodule measuring up to 1.1 cm as described above. Recommend further evaluation with a dedicated adrenal mass protocol.   MACRO: Critical Finding:  See findings. Notification was initiated on 5/4/2025 at 12:56 pm by Dr. Mercedes Jones.  (**-YCF-**) Instructions:   Signed by: Mercedes Jones 5/4/2025 6:04 PM Dictation workstation:   [id number]    XR abdomen 1 view  Result Date: 5/4/2025  Gaseous distention of small-bowel loops again seen, most likely due to ileus. No evidence of bowel obstruction. Follow-up as needed.   Signed by: Frank Armendariz 5/4/2025 12:41 PM Dictation workstation:    [id number]    XR chest 1 view  Result Date: 5/3/2025  1.  No radiographic evidence of acute cardiopulmonary disease. No focal consolidation or alveolar edema.       MACRO: None   Signed by: Herrera Nelson 5/3/2025 10:07 PM Dictation workstation:   [id number]    XR abdomen 1 view  Result Date: 5/3/2025  Persistent gaseous distention of small-bowel loops, most likely due to ileus. Follow-up as needed.   Signed by: Frank Armendariz 5/3/2025 10:18 AM Dictation workstation:   [id number]    XR abdomen 1 view  Result Date: 5/2/2025  Dilated small bowel loop in the left abdomen measuring 3.7 cm in diameter may be secondary to ileus, however if there is persistent concern for acute abdominal pathology, CT is recommended for further evaluation.   MACRO: None   Signed by: Erik Morton 5/2/2025 8:45 PM Dictation workstation:   [id number]    CT brain attack head wo IV contrast  Result Date: 5/2/2025  No evidence of acute cortical infarct or intracranial hemorrhage. Volume loss. Degree does appear to be advanced for age. If persistent concern, consider MRI   MACRO: Anirudh Gabriel message by epic chat  TRISTIN [employer] on 5/2/2025 at 6:09 pm.  (**-RCF-**) Findings:  See findings.     Signed by: Anirudh Gabriel 5/2/2025 6:10 PM Dictation workstation:   [id number]    CT brain attack angio head and neck W and WO IV contrast  Result Date: 5/2/2025  1. CTA of Neck arteries demonstrates no significant flow-limiting stenosis or dissection. 2. CTA of Intracranial arteries demonstrates no evidence for large vessel occlusion or other acute findings. 3. Multifocal mild atherosclerosis, without evidence for hemodynamically significant stenosis.   MACRO: None   Signed by: Ted Vera 5/2/2025 6:08 PM Dictation workstation:   [id number]        Assessment/Plan   Assessment & Plan  Toxic metabolic encephalopathy     Cystitis with hematuria     Hypovolemia dehydration     Severe sepsis with acute organ dysfunction (Multi)      Fungemia        Hydroureteronephrosis    Hydronephrosis      Right pyelonephritis      AMS, due to metabolic encephalopathy      PLAN: Patient is slowly improving, c/w IV fluconazole, Zosyn and vancomycin was DC'd per ID, appreciate input of urology and ID, monitor labs, med list, radiology and labs reviewed, discussed with nursing, for now continue with current Rx, follow closely.               Sushil Orozco MD           [1]   Current Facility-Administered Medications:     acetaminophen (Tylenol) tablet 650 mg, 650 mg, oral, q4h PRN, 650 mg at 05/06/25 0902 **OR** acetaminophen (Tylenol) oral liquid 650 mg, 650 mg, oral, q4h PRN **OR** acetaminophen (Tylenol) suppository 650 mg, 650 mg, rectal, q4h PRN, Lillian Ahumada APRN-CNP    amitriptyline (Elavil) tablet 10 mg, 10 mg, oral, Nightly, PEARL Lara-CNP, 10 mg at 05/05/25 2057    ammonium lactate (Lac-Hydrin) 12 % lotion, , Topical, BID, PEARL Lara-CNP, Given at 05/06/25 0851    atorvastatin (Lipitor) tablet 10 mg, 10 mg, oral, Nightly, Lillian Ahumada APRN-CNP, 10 mg at 05/05/25 2057    bisacodyl (Dulcolax) EC tablet 5 mg, 5 mg, oral, Nightly, Lillian Ahumada APRN-CNP, 5 mg at 05/03/25 2226    bisacodyl (Dulcolax) suppository 10 mg, 10 mg, rectal, Daily, Savita Mckeon, APRN-CNP, 10 mg at 05/03/25 1125    busPIRone (Buspar) tablet 10 mg, 10 mg, oral, BID, PEARL Lara-CNP, 10 mg at 05/06/25 0849    dextrose 50 % injection 12.5 g, 12.5 g, intravenous, q15 min PRN, Lillian Ahumada APRN-CNP    dextrose 50 % injection 25 g, 25 g, intravenous, q15 min PRN, ANGELINA Lara    [Held by provider] dicyclomine (Bentyl) capsule 20 mg, 20 mg, oral, Before meals & nightly, ANGELINA Lara    [Held by provider] empagliflozin (Jardiance) tablet 25 mg, 25 mg, oral, Daily, ANGELINA Lara    enoxaparin (Lovenox) syringe 40 mg, 40 mg, subcutaneous, q24h, ANGELINA Lara, 40 mg  at 05/06/25 0849    fluconazole (Diflucan) 800 mg in sodium chloride (iso)  mL, 800 mg, intravenous, q24h, Clive López MD    [Held by provider] glimepiride (Amaryl) tablet 4 mg, 4 mg, oral, BID, PEARL Lara-CNP, 4 mg at 05/03/25 1842    glucagon (Glucagen) injection 1 mg, 1 mg, intramuscular, q15 min PRN, PEARL Lara-CNP    glucagon (Glucagen) injection 1 mg, 1 mg, intramuscular, q15 min PRN, Lillian Ahumada APRN-CNP    glycerin (Adult) 2 g suppository 1 suppository, 1 suppository, rectal, Daily PRN, PEALR Lara-CNP    insulin glargine (Lantus) injection 10 Units, 10 Units, subcutaneous, Daily, PEARL Lara-CNP, 10 Units at 05/06/25 0851    insulin lispro injection 0-5 Units, 0-5 Units, subcutaneous, TID AC, Lillian Ahumada APRN-CNP, 1 Units at 05/05/25 1821    lactobacillus acidophilus tablet 1 tablet, 1 tablet, oral, BID, PEARL Lara-CNP, 1 tablet at 05/06/25 0849    levothyroxine (Synthroid, Levoxyl) tablet 75 mcg, 75 mcg, oral, Daily, PEARL Lara-CNP, 75 mcg at 05/06/25 0604    lisinopril tablet 20 mg, 20 mg, oral, Daily, PEARL Donovan-CNP, 20 mg at 05/03/25 0922    melatonin tablet 3 mg, 3 mg, oral, Nightly PRN, PEARL Lara-CNP, 3 mg at 05/05/25 2057    metoprolol succinate XL (Toprol-XL) 24 hr tablet 25 mg, 25 mg, oral, Daily, PEARL Lara-CNP, 25 mg at 05/05/25 0935    moisturizing mouth (Biotene Oral Dry Mouth) solution 1 spray, 1 spray, oral, Before meals & nightly, ANGELINA Lara, 1 spray at 05/06/25 0850    moisturizing mouth (Biotene Oral Dry Mouth) solution 1 spray, 1 spray, oral, PRN, ANGELINA Lara, 1 spray at 05/05/25 0523    nystatin (Mycostatin) 100,000 unit/gram powder 1 Application, 1 Application, Topical, BID, ANGELINA Lara, 1 Application at 05/06/25 0850    ondansetron ODT (Zofran-ODT) disintegrating tablet 4 mg, 4 mg, oral, q8h PRN  **OR** ondansetron (Zofran) injection 4 mg, 4 mg, intravenous, q8h PRN, ANGELINA Lara    pantoprazole (ProtoNix) EC tablet 40 mg, 40 mg, oral, Daily before breakfast, Norbert Rai PA-C, 40 mg at 05/06/25 0604    polyethylene glycol (Glycolax, Miralax) packet 17 g, 17 g, oral, Daily, ANGELINA Lara, 17 g at 05/06/25 0850    potassium chloride CR (Klor-Con M20) ER tablet 20 mEq, 20 mEq, oral, Once, ANGELINA Donovan    pregabalin (Lyrica) capsule 50 mg, 50 mg, oral, BID, ANGELINA Lara, 50 mg at 05/06/25 0850    sodium chloride 0.9% flush 10 mL, 10 mL, intravenous, q8h BAILEE, ANGELINA Lara, 10 mL at 05/06/25 0607    venlafaxine (Effexor) tablet 50 mg, 50 mg, oral, Nightly, ANGELINA Lara, 50 mg at 05/05/25 2057    white petrolatum (Aquaphor) ointment, , Topical, q1h PRN, ANGELINA Lara

## 2025-05-06 NOTE — DOCUMENTATION CLARIFICATION NOTE
"    PATIENT:               JILLIAN RODRIGUEZ  ACCT #:                  [id number]  MRN:                       [id number]  :                       1960  ADMIT DATE:       2025 5:42 PM  DISCH DATE:  RESPONDING PROVIDER #:        [id number]          PROVIDER RESPONSE TEXT:    Sepsis with circulatory organ dysfunction of Lactic Acidosis    CDI QUERY TEXT:    Clarification    Instruction:    Based on your assessment of the patient and the clinical information, please provide the requested documentation by clicking on the appropriate radio button and enter any additional information if prompted.    Question: Please further clarify if a relationship exists between the Sepsis and acute organ dysfunction    When answering this query, please exercise your independent professional judgment. The fact that a question is being asked, does not imply that any particular answer is desired or expected.    The patient's clinical indicators include:  Clinical Information:  Admitted for Pyonephrosis, Toxic-Metabolic Encephalopathy.    Clinical Indicators:  VS : T 36.3-37.4, HR , RR 16-31 RA w/pox 93-97, BP 83//78.  Lowest MAP 55.    Labs : WBC 18.3.  Lactate 2.5, 2.7, 1.9.  BC positive for yeast.    UA : cloudy, 500 leukocyte esterase, WBC >50.    IM note on  0715 PM states \"She meets severe sepsis criteria with tachycardia, tachypnea, leukocytosis, elevated lactate, and urinary tract infection.\"    HP by IM 5/3 0526 PM states \"Severe sepsis with acute organ dysfunction\", \"Toxic metabolic encephalopathy\", \"Cystitis with hematuria\".    Treatment: UA w/culture.  Blood cultures.  Lactate trend.  ID, Neurology, Urology consults.  VS w/MAP.  Albumin 25 g IV on .  Diflucan 400 mg IV Q2 hrs on .  Diflucan 400 mg IV Q24 hrs on .  LR IVF 1000 mL bolus x3 on , 5/3.  LR IVF continuous on 5/3.  NS IVF bolus 1000 mL on .  Zosyn 3.375 g IV Q8 hrs 5/3-.  Zosyn 4.5 g IV x1 on .  Vancomycin 1000 mg IV " Q12 hrs 5/3-5/5.  Vancomycin 1500 mg IV x2 on 5/2.    Risk Factors: Elderly female w/hx of recurrent UTI.  Options provided:  -- Sepsis with circulatory organ dysfunction of Lactic Acidosis  -- Sepsis with neurological organ dysfunction of Toxic-Metabolic Encephalopathy  -- Sepsis with circulatory organ dysfunction of Hypotension  -- Sepsis with multi-system organ dysfunction of Lactic Acidosis, Toxic-Metabolic Encephalopathy, Hypotension  -- Sepsis with other organ dysfunction, Please specify sepsis associated organ dysfunction below  -- Other - I will add my own diagnosis  -- Refer to Clinical Documentation Reviewer    Query created by: Gloria Jackson on 5/6/2025 11:41 AM      Electronically signed by:  LUPE TATE MD 5/6/2025 11:43 AM

## 2025-05-06 NOTE — NURSING NOTE
Report given to Ted GOLDBERG.  Pt will transfer to 3007 shortly.   She is now established with endocrine Dr Freeman so she should contact her to relay her blood sugar readings. Dr Freeman will adjust meds if needed...

## 2025-05-07 LAB
ANION GAP SERPL CALC-SCNC: 11 MMOL/L (ref 10–20)
BUN SERPL-MCNC: 12 MG/DL (ref 6–23)
CALCIUM SERPL-MCNC: 8.4 MG/DL (ref 8.6–10.3)
CHLORIDE SERPL-SCNC: 108 MMOL/L (ref 98–107)
CO2 SERPL-SCNC: 27 MMOL/L (ref 21–32)
CREAT SERPL-MCNC: 0.65 MG/DL (ref 0.5–1.05)
EGFRCR SERPLBLD CKD-EPI 2021: >90 ML/MIN/1.73M*2
ERYTHROCYTE [DISTWIDTH] IN BLOOD BY AUTOMATED COUNT: 16.2 % (ref 11.5–14.5)
GLUCOSE BLD MANUAL STRIP-MCNC: 167 MG/DL (ref 74–99)
GLUCOSE BLD MANUAL STRIP-MCNC: 228 MG/DL (ref 74–99)
GLUCOSE BLD MANUAL STRIP-MCNC: 247 MG/DL (ref 74–99)
GLUCOSE BLD MANUAL STRIP-MCNC: 303 MG/DL (ref 74–99)
GLUCOSE SERPL-MCNC: 150 MG/DL (ref 74–99)
HCT VFR BLD AUTO: 34.6 % (ref 36–46)
HGB BLD-MCNC: 10.7 G/DL (ref 12–16)
MAGNESIUM SERPL-MCNC: 1.7 MG/DL (ref 1.6–2.4)
MCH RBC QN AUTO: 26.9 PG (ref 26–34)
MCHC RBC AUTO-ENTMCNC: 30.9 G/DL (ref 32–36)
MCV RBC AUTO: 87 FL (ref 80–100)
NRBC BLD-RTO: 0 /100 WBCS (ref 0–0)
PLATELET # BLD AUTO: 266 X10*3/UL (ref 150–450)
POTASSIUM SERPL-SCNC: 3.5 MMOL/L (ref 3.5–5.3)
RBC # BLD AUTO: 3.98 X10*6/UL (ref 4–5.2)
SODIUM SERPL-SCNC: 142 MMOL/L (ref 136–145)
WBC # BLD AUTO: 7.1 X10*3/UL (ref 4.4–11.3)

## 2025-05-07 PROCEDURE — 83735 ASSAY OF MAGNESIUM: CPT | Performed by: NURSE PRACTITIONER

## 2025-05-07 PROCEDURE — 36415 COLL VENOUS BLD VENIPUNCTURE: CPT | Performed by: NURSE PRACTITIONER

## 2025-05-07 PROCEDURE — 2500000002 HC RX 250 W HCPCS SELF ADMINISTERED DRUGS (ALT 637 FOR MEDICARE OP, ALT 636 FOR OP/ED): Performed by: NURSE PRACTITIONER

## 2025-05-07 PROCEDURE — 2500000004 HC RX 250 GENERAL PHARMACY W/ HCPCS (ALT 636 FOR OP/ED): Mod: JZ | Performed by: INTERNAL MEDICINE

## 2025-05-07 PROCEDURE — 2500000004 HC RX 250 GENERAL PHARMACY W/ HCPCS (ALT 636 FOR OP/ED): Mod: JZ | Performed by: NURSE PRACTITIONER

## 2025-05-07 PROCEDURE — 80048 BASIC METABOLIC PNL TOTAL CA: CPT | Performed by: NURSE PRACTITIONER

## 2025-05-07 PROCEDURE — 2500000001 HC RX 250 WO HCPCS SELF ADMINISTERED DRUGS (ALT 637 FOR MEDICARE OP): Performed by: PHYSICIAN ASSISTANT

## 2025-05-07 PROCEDURE — 85027 COMPLETE CBC AUTOMATED: CPT | Performed by: NURSE PRACTITIONER

## 2025-05-07 PROCEDURE — 2500000004 HC RX 250 GENERAL PHARMACY W/ HCPCS (ALT 636 FOR OP/ED): Performed by: NURSE PRACTITIONER

## 2025-05-07 PROCEDURE — 1200000002 HC GENERAL ROOM WITH TELEMETRY DAILY

## 2025-05-07 PROCEDURE — 82947 ASSAY GLUCOSE BLOOD QUANT: CPT

## 2025-05-07 PROCEDURE — 2500000004 HC RX 250 GENERAL PHARMACY W/ HCPCS (ALT 636 FOR OP/ED): Mod: JZ

## 2025-05-07 PROCEDURE — 2500000001 HC RX 250 WO HCPCS SELF ADMINISTERED DRUGS (ALT 637 FOR MEDICARE OP): Performed by: NURSE PRACTITIONER

## 2025-05-07 RX ORDER — FLUCONAZOLE 200 MG/1
800 TABLET ORAL EVERY 24 HOURS
Status: DISCONTINUED | OUTPATIENT
Start: 2025-05-08 | End: 2025-05-09 | Stop reason: HOSPADM

## 2025-05-07 RX ORDER — LANOLIN ALCOHOL/MO/W.PET/CERES
400 CREAM (GRAM) TOPICAL ONCE
Status: COMPLETED | OUTPATIENT
Start: 2025-05-07 | End: 2025-05-07

## 2025-05-07 RX ORDER — LISINOPRIL 5 MG/1
5 TABLET ORAL ONCE
Status: COMPLETED | OUTPATIENT
Start: 2025-05-07 | End: 2025-05-07

## 2025-05-07 RX ORDER — KETOROLAC TROMETHAMINE 15 MG/ML
15 INJECTION, SOLUTION INTRAMUSCULAR; INTRAVENOUS ONCE
Status: COMPLETED | OUTPATIENT
Start: 2025-05-07 | End: 2025-05-07

## 2025-05-07 RX ORDER — POTASSIUM CHLORIDE 20 MEQ/1
20 TABLET, EXTENDED RELEASE ORAL ONCE
Status: COMPLETED | OUTPATIENT
Start: 2025-05-07 | End: 2025-05-07

## 2025-05-07 RX ORDER — FLUCONAZOLE 2 MG/ML
800 INJECTION, SOLUTION INTRAVENOUS EVERY 24 HOURS
Status: DISCONTINUED | OUTPATIENT
Start: 2025-05-08 | End: 2025-05-07

## 2025-05-07 RX ADMIN — POTASSIUM CHLORIDE 20 MEQ: 1500 TABLET, EXTENDED RELEASE ORAL at 11:24

## 2025-05-07 RX ADMIN — PREGABALIN 50 MG: 50 CAPSULE ORAL at 09:44

## 2025-05-07 RX ADMIN — Medication 1 SPRAY: at 11:23

## 2025-05-07 RX ADMIN — AMITRIPTYLINE HYDROCHLORIDE 10 MG: 10 TABLET, FILM COATED ORAL at 20:58

## 2025-05-07 RX ADMIN — ACETAMINOPHEN 650 MG: 325 TABLET ORAL at 17:44

## 2025-05-07 RX ADMIN — ONDANSETRON 4 MG: 2 INJECTION INTRAMUSCULAR; INTRAVENOUS at 12:55

## 2025-05-07 RX ADMIN — Medication: at 21:07

## 2025-05-07 RX ADMIN — FLUCONAZOLE 400 MG: 2 INJECTION, SOLUTION INTRAVENOUS at 12:55

## 2025-05-07 RX ADMIN — ATORVASTATIN CALCIUM 10 MG: 10 TABLET, FILM COATED ORAL at 20:58

## 2025-05-07 RX ADMIN — Medication: at 09:45

## 2025-05-07 RX ADMIN — Medication 1 SPRAY: at 21:07

## 2025-05-07 RX ADMIN — Medication 10 ML: at 15:49

## 2025-05-07 RX ADMIN — BUSPIRONE HYDROCHLORIDE 10 MG: 10 TABLET ORAL at 09:42

## 2025-05-07 RX ADMIN — LEVOTHYROXINE SODIUM 75 MCG: 0.07 TABLET ORAL at 05:01

## 2025-05-07 RX ADMIN — Medication 10 ML: at 21:08

## 2025-05-07 RX ADMIN — VENLAFAXINE 50 MG: 25 TABLET ORAL at 20:58

## 2025-05-07 RX ADMIN — INSULIN LISPRO 4 UNITS: 100 INJECTION, SOLUTION INTRAVENOUS; SUBCUTANEOUS at 12:55

## 2025-05-07 RX ADMIN — NYSTATIN 1 APPLICATION: 100000 POWDER TOPICAL at 21:07

## 2025-05-07 RX ADMIN — Medication 1 TABLET: at 20:58

## 2025-05-07 RX ADMIN — INSULIN GLARGINE 10 UNITS: 100 INJECTION, SOLUTION SUBCUTANEOUS at 09:47

## 2025-05-07 RX ADMIN — BUSPIRONE HYDROCHLORIDE 10 MG: 10 TABLET ORAL at 20:58

## 2025-05-07 RX ADMIN — Medication 10 ML: at 05:03

## 2025-05-07 RX ADMIN — Medication 1 TABLET: at 09:44

## 2025-05-07 RX ADMIN — INSULIN LISPRO 2 UNITS: 100 INJECTION, SOLUTION INTRAVENOUS; SUBCUTANEOUS at 17:33

## 2025-05-07 RX ADMIN — Medication 400 MG: at 11:24

## 2025-05-07 RX ADMIN — ACETAMINOPHEN 650 MG: 325 TABLET ORAL at 07:29

## 2025-05-07 RX ADMIN — LISINOPRIL 20 MG: 20 TABLET ORAL at 09:42

## 2025-05-07 RX ADMIN — NYSTATIN 1 APPLICATION: 100000 POWDER TOPICAL at 09:45

## 2025-05-07 RX ADMIN — ENOXAPARIN SODIUM 40 MG: 40 INJECTION SUBCUTANEOUS at 09:42

## 2025-05-07 RX ADMIN — INSULIN LISPRO 1 UNITS: 100 INJECTION, SOLUTION INTRAVENOUS; SUBCUTANEOUS at 07:30

## 2025-05-07 RX ADMIN — KETOROLAC TROMETHAMINE 15 MG: 15 INJECTION, SOLUTION INTRAMUSCULAR; INTRAVENOUS at 04:40

## 2025-05-07 RX ADMIN — Medication 1 SPRAY: at 15:57

## 2025-05-07 RX ADMIN — Medication 1 SPRAY: at 06:30

## 2025-05-07 RX ADMIN — PREGABALIN 50 MG: 50 CAPSULE ORAL at 20:58

## 2025-05-07 RX ADMIN — LISINOPRIL 5 MG: 5 TABLET ORAL at 15:56

## 2025-05-07 RX ADMIN — PANTOPRAZOLE SODIUM 40 MG: 40 TABLET, DELAYED RELEASE ORAL at 06:30

## 2025-05-07 ASSESSMENT — PAIN SCALES - GENERAL
PAINLEVEL_OUTOF10: 9
PAINLEVEL_OUTOF10: 7
PAINLEVEL_OUTOF10: 9
PAINLEVEL_OUTOF10: 5 - MODERATE PAIN
PAINLEVEL_OUTOF10: 8
PAINLEVEL_OUTOF10: 0 - NO PAIN
PAINLEVEL_OUTOF10: 8

## 2025-05-07 ASSESSMENT — COGNITIVE AND FUNCTIONAL STATUS - GENERAL
WALKING IN HOSPITAL ROOM: TOTAL
MOVING FROM LYING ON BACK TO SITTING ON SIDE OF FLAT BED WITH BEDRAILS: A LOT
STANDING UP FROM CHAIR USING ARMS: TOTAL
EATING MEALS: A LITTLE
CLIMB 3 TO 5 STEPS WITH RAILING: TOTAL
MOBILITY SCORE: 8
HELP NEEDED FOR BATHING: TOTAL
STANDING UP FROM CHAIR USING ARMS: TOTAL
HELP NEEDED FOR BATHING: TOTAL
PERSONAL GROOMING: A LITTLE
MOVING TO AND FROM BED TO CHAIR: TOTAL
TOILETING: TOTAL
MOVING FROM LYING ON BACK TO SITTING ON SIDE OF FLAT BED WITH BEDRAILS: A LOT
WALKING IN HOSPITAL ROOM: TOTAL
MOBILITY SCORE: 8
TURNING FROM BACK TO SIDE WHILE IN FLAT BAD: A LOT
DRESSING REGULAR UPPER BODY CLOTHING: TOTAL
DRESSING REGULAR UPPER BODY CLOTHING: TOTAL
DAILY ACTIVITIY SCORE: 10
MOVING TO AND FROM BED TO CHAIR: TOTAL
TOILETING: TOTAL
DAILY ACTIVITIY SCORE: 10
EATING MEALS: A LITTLE
CLIMB 3 TO 5 STEPS WITH RAILING: TOTAL
PERSONAL GROOMING: A LITTLE
TURNING FROM BACK TO SIDE WHILE IN FLAT BAD: A LOT
DRESSING REGULAR LOWER BODY CLOTHING: TOTAL
DRESSING REGULAR LOWER BODY CLOTHING: TOTAL

## 2025-05-07 ASSESSMENT — PAIN DESCRIPTION - LOCATION
LOCATION: HIP
LOCATION: HIP
LOCATION: BACK

## 2025-05-07 ASSESSMENT — PAIN DESCRIPTION - ORIENTATION: ORIENTATION: RIGHT

## 2025-05-07 ASSESSMENT — PAIN - FUNCTIONAL ASSESSMENT
PAIN_FUNCTIONAL_ASSESSMENT: 0-10

## 2025-05-07 NOTE — CARE PLAN
EOS note: Pt oriented x3 and pleasant. Turned 2 hours as patient tolerated. Continued with iv antibiotics. Sugar checks ACHS with insulin coverage as needed. Patient ate all meals, encouraged hydration. VSS. Medicated with tylenol for chronic hip/back pain, reported relief. Had BM today, purewick in place. Continue with antifungal via IV, switched to oral at this time    Four Eye Skin Check completed by Riana  No acute findings noted.      Pt resting at this time. Bed is in lowest position and locked with alarm on. Call light and personal possesions are within reach.

## 2025-05-07 NOTE — CARE PLAN
The patient's goals for the shift include    Problem: Pain - Adult  Goal: Verbalizes/displays adequate comfort level or baseline comfort level  Outcome: Progressing     Problem: Safety - Adult  Goal: Free from fall injury  Outcome: Progressing     Problem: Discharge Planning  Goal: Discharge to home or other facility with appropriate resources  Outcome: Progressing     Problem: Chronic Conditions and Co-morbidities  Goal: Patient's chronic conditions and co-morbidity symptoms are monitored and maintained or improved  Outcome: Progressing     Problem: Nutrition  Goal: Nutrient intake appropriate for maintaining nutritional needs  Outcome: Progressing     Problem: Fall/Injury  Goal: Not fall by end of shift  Outcome: Progressing  Goal: Be free from injury by end of the shift  Outcome: Progressing  Goal: Verbalize understanding of personal risk factors for fall in the hospital  Outcome: Progressing  Goal: Verbalize understanding of risk factor reduction measures to prevent injury from fall in the home  Outcome: Progressing  Goal: Pace activities to prevent fatigue by end of the shift  Outcome: Progressing     Problem: Skin  Goal: Decreased wound size/increased tissue granulation at next dressing change  Outcome: Progressing  Flowsheets (Taken 5/7/2025 0009)  Decreased wound size/increased tissue granulation at next dressing change: Promote sleep for wound healing  Goal: Participates in plan/prevention/treatment measures  Outcome: Progressing  Flowsheets (Taken 5/7/2025 0009)  Participates in plan/prevention/treatment measures: Elevate heels  Goal: Prevent/manage excess moisture  Outcome: Progressing  Flowsheets (Taken 5/7/2025 0009)  Prevent/manage excess moisture: Moisturize dry skin  Goal: Prevent/minimize sheer/friction injuries  Outcome: Progressing  Flowsheets (Taken 5/7/2025 0009)  Prevent/minimize sheer/friction injuries: HOB 30 degrees or less  Goal: Promote/optimize nutrition  Outcome: Progressing  Flowsheets  (Taken 5/7/2025 0009)  Promote/optimize nutrition: Monitor/record intake including meals  Goal: Promote skin healing  Outcome: Progressing  Flowsheets (Taken 5/7/2025 0009)  Promote skin healing: Turn/reposition every 2 hours/use positioning/transfer devices     Problem: Infection prevention/bleeding  Goal: Infection s/sx managed  Outcome: Progressing  Goal: No further progression of infection  Outcome: Progressing  Goal: No signs of bleeding  Outcome: Progressing  Goal: Normal coagulation studies  Outcome: Progressing     Problem: Diabetes  Goal: Maintain glucose levels >70mg/dl to <250mg/dl throughout shift  Outcome: Progressing     Problem: Perfusion/Cardiac  Goal: Adequate perfusion to organs/extremities  Outcome: Progressing  Goal: Hemodynamically stable  Outcome: Progressing  Goal: No cardiac arrhythmias  Outcome: Progressing     Problem: Respiratory/Oxygenation  Goal: No signs of respiratory compromise  Outcome: Progressing  Goal: Tolerates activity without increased O2 demands  Outcome: Progressing     Problem: Neuro/Coping  Goal: Minimal anxiety; utilize coping mechanisms  Outcome: Progressing  Goal: No signs of neurological compromise  Outcome: Progressing  Goal: Increase self care/family involvement  Outcome: Progressing     Problem: Fluid/Electrolyte/Nutrition  Goal: Fluid balance within 1 liter of normovolemia  Outcome: Progressing  Goal: No signs of renal failure  Outcome: Progressing  Goal: Normal electrolyte levels  Outcome: Progressing  Goal: Normal glucose levels  Outcome: Progressing  Goal: Tolerates nutritional intake  Outcome: Progressing       The clinical goals for the shift include see poc.    Eos note:   0409- pt complained of pain 9/10 in R abdomen. Alicia, CNP notified. One time dose of Toradol ordered and administered.     Pt remains A&Ox4; forgetful at times. On RA, Sinus tramaine on tele, on a carb consistent diet. No other complaints at this time. Pt resting comfortably in bed. Safety  maintained.

## 2025-05-07 NOTE — PROGRESS NOTES
"Lisa Vu is a 64 y.o. female on day 5 of admission presenting with Toxic metabolic encephalopathy, noted to have fungemia, was started on IV Diflucan, is slowly improving.    Subjective   No cp       Objective   Lying in bed, no acute distress    Current Medications[1]       Physical Exam  Constitutional:       Comments: Awake and alert   HENT:      Head: Normocephalic.   Eyes:      Conjunctiva/sclera: Conjunctivae normal.   Cardiovascular:      Rate and Rhythm: Regular rhythm.      Heart sounds: Normal heart sounds.   Pulmonary:      Effort: Pulmonary effort is normal.      Breath sounds: Normal breath sounds.   Abdominal:      General: Bowel sounds are normal.      Palpations: Abdomen is soft.   Musculoskeletal:      Comments: No pedal edema   Skin:     General: Skin is warm and dry.   Neurological:      Mental Status: Mental status is at baseline.   Psychiatric:         Mood and Affect: Mood normal.           Last Recorded Vitals  Blood pressure 177/86, pulse 57, temperature 36 °C (96.8 °F), temperature source Rectal, resp. rate 20, height 1.727 m (5' 8\"), weight 101 kg (222 lb 8 oz), SpO2 99%.  Intake/Output last 3 Shifts:  I/O last 3 completed shifts:  In: - (0 mL/kg)   Out: 2900 (28.7 mL/kg) [Urine:2900 (0.8 mL/kg/hr)]  Weight: 100.9 kg           Labs:       Results for orders placed or performed during the hospital encounter of 05/02/25 (from the past 24 hours)   POCT GLUCOSE   Result Value Ref Range    POCT Glucose 185 (H) 74 - 99 mg/dL   POCT GLUCOSE   Result Value Ref Range    POCT Glucose 201 (H) 74 - 99 mg/dL   POCT GLUCOSE   Result Value Ref Range    POCT Glucose 193 (H) 74 - 99 mg/dL   CBC   Result Value Ref Range    WBC 7.1 4.4 - 11.3 x10*3/uL    nRBC 0.0 0.0 - 0.0 /100 WBCs    RBC 3.98 (L) 4.00 - 5.20 x10*6/uL    Hemoglobin 10.7 (L) 12.0 - 16.0 g/dL    Hematocrit 34.6 (L) 36.0 - 46.0 %    MCV 87 80 - 100 fL    MCH 26.9 26.0 - 34.0 pg    MCHC 30.9 (L) 32.0 - 36.0 g/dL    RDW 16.2 (H) 11.5 - " 14.5 %    Platelets 266 150 - 450 x10*3/uL   Basic metabolic panel   Result Value Ref Range    Glucose 150 (H) 74 - 99 mg/dL    Sodium 142 136 - 145 mmol/L    Potassium 3.5 3.5 - 5.3 mmol/L    Chloride 108 (H) 98 - 107 mmol/L    Bicarbonate 27 21 - 32 mmol/L    Anion Gap 11 10 - 20 mmol/L    Urea Nitrogen 12 6 - 23 mg/dL    Creatinine 0.65 0.50 - 1.05 mg/dL    eGFR >90 >60 mL/min/1.73m*2    Calcium 8.4 (L) 8.6 - 10.3 mg/dL   Magnesium   Result Value Ref Range    Magnesium 1.70 1.60 - 2.40 mg/dL   POCT GLUCOSE   Result Value Ref Range    POCT Glucose 167 (H) 74 - 99 mg/dL        Radiology  US renal complete  Result Date: 5/5/2025  Hydronephrosis has resolved. No hydronephrosis is noted bilaterally. There is posterior wall thickening verse layering debris along the right UVJ within the urinary bladder. Recommend correlation with patient's urinalysis. Consider cystoscopy.   MACRO: None   Signed by: Shaheen Pinon 5/5/2025 5:22 PM Dictation workstation:   [id number]    CT abdomen pelvis w IV contrast  Result Date: 5/4/2025  1.  Mild right-sided hydroureteronephrosis with diffuse ureteral wall thickening and periureteral fat stranding throughout its course to the level of vesicoureteral junction. These findings raise concern for infectious/inflammatory ureteritis. No obvious distal obstructing ureteral calculus is noted on the CT examination. There is also diffuse posterior wall thickening of the urinary bladder extending to level of bilateral vesicoureteral junctions. Constellation of these findings raise concern for cystitis with ascending ureteritis/pyelitis. No significant left-sided ureteral or pelvic dilatation/ureteral inflammation is noted at this point in time. Recommend clinical and laboratory correlation for cystitis. This asymmetric posterior wall thickening of the urinary bladder is most likely favored to be related to cystitis, however other possibilities including neoplastic process can not be  completely excluded. Recommend urologic consultation and further evaluation as clinically warranted. 2. Findings suggestive of prior bariatric surgery. There is intraluminal hyperdensity in the distal esophagus extending to the gastric pouch with slow trickle of this intraluminal hyperdensity into the rest of the stomach which connects with the duodenum. These findings could be related to prior surgery (although the type of surgery is unclear). Recommend correlation with type of surgery and patient's symptomatology associated with these findings. 3. Mild diffuse circumferential thickening of the distal thoracic esophagus with suggestion of small hiatal hernia. These findings are concerning for reflux. Recommend correlation with patient's symptomatology. Gastroenterology consultation and further evaluation with upper GI endoscopy can be performed for further evaluation as clinically warranted. 4. Small bilateral pleural effusions. Mild subpleural ground-glass opacities in the dependent region of bilateral lower lobes are likely favored to represent dependent atelectasis, however developing pneumonia can also be considered in the differential in appropriate clinical setting. 5. Nodular thickening of the left adrenal gland with the prominent nodule measuring up to 1.1 cm as described above. Recommend further evaluation with a dedicated adrenal mass protocol.   MACRO: Critical Finding:  See findings. Notification was initiated on 5/4/2025 at 12:56 pm by Dr. Mercedes Jones.  (**-YCF-**) Instructions:   Signed by: Mercedes Jones 5/4/2025 6:04 PM Dictation workstation:   [id number]    XR abdomen 1 view  Result Date: 5/4/2025  Gaseous distention of small-bowel loops again seen, most likely due to ileus. No evidence of bowel obstruction. Follow-up as needed.   Signed by: Frank Armendariz 5/4/2025 12:41 PM Dictation workstation:   [id number]    XR chest 1 view  Result Date: 5/3/2025  1.  No radiographic evidence of acute  cardiopulmonary disease. No focal consolidation or alveolar edema.       MACRO: None   Signed by: Herrera Nelson 5/3/2025 10:07 PM Dictation workstation:   [id number]    XR abdomen 1 view  Result Date: 5/3/2025  Persistent gaseous distention of small-bowel loops, most likely due to ileus. Follow-up as needed.   Signed by: Frank Armendariz 5/3/2025 10:18 AM Dictation workstation:   [id number]    XR abdomen 1 view  Result Date: 5/2/2025  Dilated small bowel loop in the left abdomen measuring 3.7 cm in diameter may be secondary to ileus, however if there is persistent concern for acute abdominal pathology, CT is recommended for further evaluation.   MACRO: None   Signed by: Erik Morton 5/2/2025 8:45 PM Dictation workstation:   [id number]    CT brain attack head wo IV contrast  Result Date: 5/2/2025  No evidence of acute cortical infarct or intracranial hemorrhage. Volume loss. Degree does appear to be advanced for age. If persistent concern, consider MRI   MACRO: Anirudh Gabriel message by epic leti JALLOH on 5/2/2025 at 6:09 pm.  (**-RCF-**) Findings:  See findings.     Signed by: Anirudh Gabriel 5/2/2025 6:10 PM Dictation workstation:   [id number]    CT brain attack angio head and neck W and WO IV contrast  Result Date: 5/2/2025  1. CTA of Neck arteries demonstrates no significant flow-limiting stenosis or dissection. 2. CTA of Intracranial arteries demonstrates no evidence for large vessel occlusion or other acute findings. 3. Multifocal mild atherosclerosis, without evidence for hemodynamically significant stenosis.   MACRO: None   Signed by: Ted Vera 5/2/2025 6:08 PM Dictation workstation:   [id number]        Assessment/Plan   Assessment & Plan  Toxic metabolic encephalopathy     Cystitis with hematuria     Hypovolemia dehydration     Severe sepsis with acute organ dysfunction (Multi)     Fungemia        Hydroureteronephrosis    Hydronephrosis      Right pyelonephritis      AMS, due to metabolic  encephalopathy      PLAN: Patient remains on IV Diflucan, per ID can be switched to oral on discharge for total of 14 days treatment for repeat blood culture no growth so far, med list, radiology and labs reviewed, d/w nursing, for now c/w current Rx, DVT prophylaxis, follow closely.                Sushil Orozco MD           [1]   Current Facility-Administered Medications:     acetaminophen (Tylenol) tablet 650 mg, 650 mg, oral, q4h PRN, 650 mg at 05/07/25 0729 **OR** acetaminophen (Tylenol) oral liquid 650 mg, 650 mg, oral, q4h PRN **OR** acetaminophen (Tylenol) suppository 650 mg, 650 mg, rectal, q4h PRN, PEARL Lara-CNP    amitriptyline (Elavil) tablet 10 mg, 10 mg, oral, Nightly, PEARL Lara-CNP, 10 mg at 05/06/25 2216    ammonium lactate (Lac-Hydrin) 12 % lotion, , Topical, BID, PEARL Lara-CNP, Given at 05/07/25 0945    atorvastatin (Lipitor) tablet 10 mg, 10 mg, oral, Nightly, PEARL Lara-CNP, 10 mg at 05/06/25 2217    bisacodyl (Dulcolax) EC tablet 5 mg, 5 mg, oral, Nightly, PEARL Lara-CNP, 5 mg at 05/06/25 2217    bisacodyl (Dulcolax) suppository 10 mg, 10 mg, rectal, Daily, PEARL Verduzco-CNP, 10 mg at 05/03/25 1125    busPIRone (Buspar) tablet 10 mg, 10 mg, oral, BID, PEARL Lara-CNP, 10 mg at 05/07/25 0942    dextrose 50 % injection 12.5 g, 12.5 g, intravenous, q15 min PRN, PEARL Lara-CNP    dextrose 50 % injection 25 g, 25 g, intravenous, q15 min PRN, PEARL Lara-CNP    [Held by provider] dicyclomine (Bentyl) tablet 20 mg, 20 mg, oral, Before meals & nightly, ANGELINA Lara    [Held by provider] empagliflozin (Jardiance) tablet 25 mg, 25 mg, oral, Daily, ANGELINA Lara    enoxaparin (Lovenox) syringe 40 mg, 40 mg, subcutaneous, q24h, ANGELINA Lara, 40 mg at 05/07/25 0942    fluconazole (Diflucan) 800 mg in sodium chloride (iso)  mL, 800 mg,  intravenous, q24h, Clive López MD, Last Rate: 200 mL/hr at 05/06/25 1516, 400 mg at 05/06/25 1339    [Held by provider] glimepiride (Amaryl) tablet 4 mg, 4 mg, oral, BID, PEARL Lara-CNP, 4 mg at 05/03/25 1842    glucagon (Glucagen) injection 1 mg, 1 mg, intramuscular, q15 min PRN, PEARL Lara-CNP    glucagon (Glucagen) injection 1 mg, 1 mg, intramuscular, q15 min PRN, PEARL Lara-CNP    glycerin (Adult) 2 g suppository 1 suppository, 1 suppository, rectal, Daily PRN, ANGELINA Lara    insulin glargine (Lantus) injection 10 Units, 10 Units, subcutaneous, Daily, ANGELINA Lara, 10 Units at 05/07/25 0947    insulin lispro injection 0-5 Units, 0-5 Units, subcutaneous, TID AC, PEARL Lara-CNP, 1 Units at 05/07/25 0730    lactobacillus acidophilus tablet 1 tablet, 1 tablet, oral, BID, PEARL Lara-CNP, 1 tablet at 05/07/25 0944    levothyroxine (Synthroid, Levoxyl) tablet 75 mcg, 75 mcg, oral, Daily, PEARL Lara-CNP, 75 mcg at 05/07/25 0501    lisinopril tablet 20 mg, 20 mg, oral, Daily, ANGELINA Donovan, 20 mg at 05/07/25 0942    magnesium oxide (Mag-Ox) tablet 400 mg, 400 mg, oral, Once, ANGELINA Donovan    melatonin tablet 3 mg, 3 mg, oral, Nightly PRN, PEARL Lara-CNP, 3 mg at 05/05/25 2057    metoprolol succinate XL (Toprol-XL) 24 hr tablet 25 mg, 25 mg, oral, Daily, ANGELINA Lara, 25 mg at 05/05/25 0935    moisturizing mouth (Biotene Oral Dry Mouth) solution 1 spray, 1 spray, oral, Before meals & nightly, PEARL Lara-CNP, 1 spray at 05/07/25 0630    moisturizing mouth (Biotene Oral Dry Mouth) solution 1 spray, 1 spray, oral, PRN, PEARL Lara-CNP, 1 spray at 05/05/25 0523    nystatin (Mycostatin) 100,000 unit/gram powder 1 Application, 1 Application, Topical, BID, PEARL Lara-CNP, 1 Application at 05/07/25 0945    ondansetron ODT  (Zofran-ODT) disintegrating tablet 4 mg, 4 mg, oral, q8h PRN **OR** ondansetron (Zofran) injection 4 mg, 4 mg, intravenous, q8h PRN, ANGELINA Lara    pantoprazole (ProtoNix) EC tablet 40 mg, 40 mg, oral, Daily before breakfast, Norbert Rai PA-C, 40 mg at 05/07/25 0630    polyethylene glycol (Glycolax, Miralax) packet 17 g, 17 g, oral, Daily, ANGELINA Lara, 17 g at 05/06/25 0850    potassium chloride CR (Klor-Con M20) ER tablet 20 mEq, 20 mEq, oral, Once, ANGELINA Donovan    pregabalin (Lyrica) capsule 50 mg, 50 mg, oral, BID, ANGELINA Lara, 50 mg at 05/07/25 0944    sodium chloride 0.9% flush 10 mL, 10 mL, intravenous, q8h BAILEE, ANGELINA Lara, 10 mL at 05/07/25 0503    venlafaxine (Effexor) tablet 50 mg, 50 mg, oral, Nightly, ANGELINA Lara, 50 mg at 05/06/25 2216    white petrolatum (Aquaphor) ointment, , Topical, q1h PRN, ANGELINA Lara

## 2025-05-07 NOTE — PROGRESS NOTES
INPATIENT PROGRESS NOTES    PATIENT NAME: Lisa Vu    MRN: [id number]  SERVICE DATE:  5/7/2025   SERVICE TIME:  2:20 PM    SIGNATURE: Clive López MD      ASSESSMENT :   -C.glabrata Fungemia. S-Fluc DD  -Right pyelonephritis  -Right hydronephrosis  -Acute encephalopathy  -Positive blood culture for staph capitis likely contamination  -Leukocytosis  -69 y/o female with CKD 2, dementia T2DM, recurrent UTI.     PLAN:  - Continue high-dose fluconazole, can be switched to p.o.    - Day# 5/14 of antifungal treatment  - Repeat blood culture NGTD  - Closely monitor for ATB side effects including rash, Diarrhea/CDI, thrombocytopenia, NAYELY, etc.  - Urology team is following      SUBJECTIVE  Afebrile  No events overnight       OBJECTIVE  PHYSICAL EXAM:   Patient Vitals for the past 24 hrs:   BP Temp Temp src Pulse Resp SpO2 Weight   05/07/25 1200 (!) 190/85 36 °C (96.8 °F) Temporal 57 16 98 % --   05/07/25 0800 177/86 36 °C (96.8 °F) Rectal 57 20 99 % --   05/07/25 0600 -- -- -- -- -- -- 101 kg (222 lb 8 oz)   05/07/25 0400 (!) 185/83 35.4 °C (95.7 °F) Temporal 54 20 98 % --   05/07/25 0000 148/71 35.8 °C (96.4 °F) Temporal 54 16 98 % --   05/06/25 2000 (!) 184/94 35.5 °C (95.9 °F) Temporal 53 -- 99 % --   05/06/25 1820 169/82 36.1 °C (97 °F) -- 84 18 95 % --   05/06/25 1600 -- -- -- 56 -- -- --   05/06/25 1450 165/72 -- -- 60 -- 98 % --   05/06/25 1445 172/74 36.8 °C (98.2 °F) Temporal 60 18 97 % --         Gen: NAD  Neck: symmetric, no mass  Cardiovascular: RRR  Respiratory: No distress       Labs:  Lab Results   Component Value Date    WBC 7.1 05/07/2025    HGB 10.7 (L) 05/07/2025    HCT 34.6 (L) 05/07/2025    MCV 87 05/07/2025     05/07/2025     Lab Results   Component Value Date    GLUCOSE 150 (H) 05/07/2025    CALCIUM 8.4 (L) 05/07/2025     05/07/2025    K 3.5 05/07/2025    CO2 27 05/07/2025     (H) 05/07/2025    BUN 12 05/07/2025    CREATININE 0.65 05/07/2025   ESR: --No results found for:  "\"SEDRATE\"No results found for: \"CRP\"  Lab Results   Component Value Date    ALT 9 05/02/2025    AST 13 05/02/2025    ALKPHOS 70 05/02/2025    BILITOT 0.7 05/02/2025         DATA:   Diagnostic tests reviewed for today's visit:    Labs this admission reviewed  Imagings this admission reviewed  Cultures: Reviewed        Clive López MD.   Infectious Disease Attending            This note was partially created using voice recognition software and is inherently subject to errors including those of syntax and \"sound-alike\" substitutions which may escape proofreading. In such instances, original meaning may be extrapolated by contextual derivation       "

## 2025-05-07 NOTE — PROGRESS NOTES
"Nutrition Follow Up Assessment:   Nutrition Assessment       Patient is a 64 y.o. female presenting with concern for altered mental status, not following commands.     5/7/25: follow up note. Urology and ID on consult. Pt continues on antibiotics. Per meal documentation: 5/7 100%B, 5/4 100%D, 50%B, 75%L. Pt reports good appetite and interested in an ONS at dinner in case she's hungry overnight.     History of type 2 diabetes, CKD, GERD, hypothyroidism, hyperlipidemia     Nutrition History:  Energy Intake: Fair 50-75 %  Pain affecting nutrition status: Yes  If yes, Informed Nursing: Pt states everything she eats gets \"stuck\" in her upper stomach area...and this has been ongoing for like 3 years. She also complains of the entire right side of her body (head to toe) is in pain.  Food and Nutrient History: Pt reports that she has been at [facility] only for 1 month. (Prior to this she was at [facility] for ~4 months where apparently she was moving around with her walker.) [facility] and the nurse confirmed that she has not been there long and is now primarily bedbound. Pt explains \"with neuropathy you never know when that will hit & now she cannot get out of bed, but she is hoping to order an electric w/c\". Pt explained she has been on Ozempic and has been losing weight (as a result of not having any appetite). She reported she lost from 250 to 181 lbs all in the matter of 8 months. Nurse was not sure about this \"weight loss\" and reported her last weight on 4/08 was 204.4 lbs. Admit weight at [place] 5/02 was 212 lb. Both patient and nurse confirmed that pt prefers to remain in her bed and will take all meals in her bed. She is on a Consistent Carb diet and accepts what is served most days. Pt states she caba snot snack much, but has \"tiny\" pretzels that she will nibble on in the evening. She is aware she has diabetes and does seem to eat accordingly. For bfast, she eats a few bites of fruit and maybe " "yogurt. She likes beltre, but only eats 1/2 sausage if this is served. For Lunch & Dinner, she avoids bread but will eat the meat and potatoes. She also \"allows\" herself pudding or ice cream and loves to drink V-8.  Food Intolerance:  (Pt refuses to drink orange juice and avoids eating bread. She also avoids grapes because they have too much sugar. She will not eat \"solid/heavy\" meats either.)       Anthropometrics:  Height: 172.7 cm (5' 8\")   Weight: 101 kg (222 lb 8 oz)   BMI (Calculated): 33.84  IBW/kg (Dietitian Calculated): 63.5 kg  Percent of IBW: 149.91 %        Weight History:   [facility] reports last weight was 204.4 lbs (4/08/25)   Per electronic chart: 1/24/25 = 198 lb; 12/02/24 = 200 lb; 6/21/23 = 205 lb  Weight Change %:  Weight History / % Weight Change: [facility] reports no significant weight changes that they are aware of.  Significant Weight Loss: No    Nutrition Focused Physical Exam Findings:    Subcutaneous Fat Loss:   Orbital Fat Pads: Well nourished (slightly bulging fat pads)  Buccal Fat Pads: Well nourished (full, rounded cheeks)  Triceps: Well nourished (ample fat tissue)  Ribs: Well nourished (chest is full, ribs do not show, slight to no protrusion of the iliac crest)  Muscle Wasting:  Temporalis: Well nourished (well-defined muscle)  Pectoralis (Clavicular Region): Well nourished (clavicle not visible)  Deltoid/Trapezius: Well nourished (rounded appearance at arm, shoulder, neck)  Interosseous: Well nourished (muscle bulges)  Edema:  Edema: +1 trace  Edema Location: 1+ RLE, nonpitting generalized and LLE and BUE  Physical Findings:  Skin: Positive (pale, warm, dry , bruising and ecchymosis.)    Nutrition Significant Labs:  POCT glucose 156-222CBC Trend:   Results from last 7 days   Lab Units 05/07/25  0559 05/06/25  0509 05/05/25  0648 05/04/25  0532   WBC AUTO x10*3/uL 7.1 7.6 6.9 8.8   RBC AUTO x10*6/uL 3.98* 4.01 3.77* 4.03   HEMOGLOBIN g/dL 10.7* 10.8* 10.4* 11.0*   HEMATOCRIT % 34.6* 34.6* 33.9* " 36.0   MCV fL 87 86 90 89   PLATELETS AUTO x10*3/uL 266 237 207 217    , BMP Trend:   Results from last 7 days   Lab Units 05/07/25  0559 05/06/25  0509 05/05/25  0648 05/04/25  0532   GLUCOSE mg/dL 150* 104* 211* 54*   CALCIUM mg/dL 8.4* 8.4* 8.1* 8.2*   SODIUM mmol/L 142 139 139 139   POTASSIUM mmol/L 3.5 3.6 4.0 3.3*   CO2 mmol/L 27 26 26 27   CHLORIDE mmol/L 108* 105 106 105   BUN mg/dL 12 11 13 18   CREATININE mg/dL 0.65 0.67 0.84 0.86    , A1C:  Lab Results   Component Value Date    HGBA1C 12.9 (H) 01/26/2023   , BG POCT trend:   Results from last 7 days   Lab Units 05/07/25  1126 05/07/25  0719 05/06/25  1921 05/06/25  1537 05/06/25  1203   POCT GLUCOSE mg/dL 303* 167* 193* 201* 185*        I/O:   Last BM Date: 05/06/25; Stool Appearance: Formed (05/04/25 0938)    Dietary Orders (From admission, onward)       Start     Ordered    05/07/25 1527  Oral nutritional supplements  Until discontinued        Question Answer Comment   Deliver with Dinner    Select supplement: Ensure High Protein        05/07/25 1526    05/04/25 2226  Adult diet Consistent Carb; CCD 75 gm/meal  Diet effective now        Question Answer Comment   Diet type Consistent Carb    Carb diet selection: CCD 75 gm/meal        05/04/25 2225    05/02/25 2221  May Participate in Room Service  ( ROOM SERVICE MAY PARTICIPATE)  Once        Question:  .  Answer:  Yes    05/02/25 2220                   Estimated Needs:   Total Energy Estimated Needs in 24 hours (kCal): 1867 kCal  Method for Estimating Needs: MSJ (1556) x 1.2 x 1.0  Total Protein Estimated Needs in 24 Hours (g):  (64-76)  Method for Estimating 24 Hour Protein Needs: 1.0-1.2g/kg IBW  Total Fluid Estimated Needs in 24 Hours (mL): 1900 mL  Method for Estimating 24 Hour Fluid Needs: 1 mL/kcal      Nutrition Diagnosis   Malnutrition Diagnosis  Patient has Malnutrition Diagnosis: No    Nutrition Diagnosis  Patient has Nutrition Diagnosis: Yes  Diagnosis Status (1): Active  Nutrition Diagnosis  1: Obesity class I  Related to (1): sedentary lifestyle  As Evidenced by (1): BMI = 32.25 kg/m2; pt prefers to remain in bed.  Additional Nutrition Diagnosis: Diagnosis 2  Diagnosis Status (2): Active  Nutrition Diagnosis 2: Altered nutrition related to laboratory values  Related to (2): glycemic control  As Evidenced by (2): 156-222 mg/dL       Nutrition Interventions/Recommendations   Nutrition prescription for oral nutrition    Nutrition Recommendations:  Individualized Nutrition Prescription Provided for : Diet: continue with oral diet as ordered - adult diet consistent carb CCD 75gm/meal as ordered + will add Ensure HP at dinner.    Nutrition Interventions/Goals:   Meals and Snacks: Carbohydrate-modified diet  Goal: will consume 75% of meals.  Medical Food Supplement: Commercial beverage medical food supplement therapy  Goal: will provide Ensure HP at dinner (160kcal, 16g pro per 8oz)  Additional Interventions: n/a           Nutrition Monitoring and Evaluation   Food/Nutrient Related History Monitoring  Monitoring and Evaluation Plan: Estimated Energy Intake  Estimated Energy Intake: Energy intake greater or equal to 75% of estimated energy needs    Anthropometric Measurements  Monitoring and Evaluation Plan: Body weight  Body Weight: Body weight - Maintain stable weight    Biochemical Data, Medical Tests and Procedures  Monitoring and Evaluation Plan: Glucose/endocrine profile, Electrolyte/renal panel  Electrolyte and Renal Panel: Electrolytes within normal limits  Glucose/Endocrine Profile: Glucose within normal limits ( mg/dL)    Physical Exam Findings  Monitoring and Evaluation Plan: Digestive System  Digestive System Finding: Abdominal pain  Criteria: will resolve    Goal Status: Some progress toward goal(s)    Time Spent (min): 30 minutes  Follow up 5-7 days  Last RD note 5/7/25

## 2025-05-08 LAB
ANION GAP SERPL CALC-SCNC: 12 MMOL/L (ref 10–20)
ATRIAL RATE: 109 BPM
BUN SERPL-MCNC: 13 MG/DL (ref 6–23)
CALCIUM SERPL-MCNC: 8.4 MG/DL (ref 8.6–10.3)
CHLORIDE SERPL-SCNC: 107 MMOL/L (ref 98–107)
CO2 SERPL-SCNC: 25 MMOL/L (ref 21–32)
CREAT SERPL-MCNC: 0.63 MG/DL (ref 0.5–1.05)
EGFRCR SERPLBLD CKD-EPI 2021: >90 ML/MIN/1.73M*2
ERYTHROCYTE [DISTWIDTH] IN BLOOD BY AUTOMATED COUNT: 16.3 % (ref 11.5–14.5)
GLUCOSE BLD MANUAL STRIP-MCNC: 148 MG/DL (ref 74–99)
GLUCOSE BLD MANUAL STRIP-MCNC: 168 MG/DL (ref 74–99)
GLUCOSE BLD MANUAL STRIP-MCNC: 192 MG/DL (ref 74–99)
GLUCOSE BLD MANUAL STRIP-MCNC: 240 MG/DL (ref 74–99)
GLUCOSE SERPL-MCNC: 172 MG/DL (ref 74–99)
HCT VFR BLD AUTO: 37.7 % (ref 36–46)
HGB BLD-MCNC: 11.4 G/DL (ref 12–16)
MAGNESIUM SERPL-MCNC: 1.76 MG/DL (ref 1.6–2.4)
MCH RBC QN AUTO: 27.2 PG (ref 26–34)
MCHC RBC AUTO-ENTMCNC: 30.2 G/DL (ref 32–36)
MCV RBC AUTO: 90 FL (ref 80–100)
NRBC BLD-RTO: 0 /100 WBCS (ref 0–0)
P AXIS: 74 DEGREES
P OFFSET: 185 MS
P ONSET: 128 MS
PLATELET # BLD AUTO: 223 X10*3/UL (ref 150–450)
POTASSIUM SERPL-SCNC: 4.1 MMOL/L (ref 3.5–5.3)
PR INTERVAL: 164 MS
Q ONSET: 210 MS
QRS COUNT: 18 BEATS
QRS DURATION: 94 MS
QT INTERVAL: 326 MS
QTC CALCULATION(BAZETT): 439 MS
QTC FREDERICIA: 397 MS
R AXIS: -47 DEGREES
RBC # BLD AUTO: 4.19 X10*6/UL (ref 4–5.2)
SODIUM SERPL-SCNC: 140 MMOL/L (ref 136–145)
T AXIS: 80 DEGREES
T OFFSET: 373 MS
VENTRICULAR RATE: 109 BPM
WBC # BLD AUTO: 8.2 X10*3/UL (ref 4.4–11.3)

## 2025-05-08 PROCEDURE — 83735 ASSAY OF MAGNESIUM: CPT | Performed by: NURSE PRACTITIONER

## 2025-05-08 PROCEDURE — 2500000002 HC RX 250 W HCPCS SELF ADMINISTERED DRUGS (ALT 637 FOR MEDICARE OP, ALT 636 FOR OP/ED): Performed by: NURSE PRACTITIONER

## 2025-05-08 PROCEDURE — 36415 COLL VENOUS BLD VENIPUNCTURE: CPT | Performed by: NURSE PRACTITIONER

## 2025-05-08 PROCEDURE — 1200000002 HC GENERAL ROOM WITH TELEMETRY DAILY

## 2025-05-08 PROCEDURE — 2500000001 HC RX 250 WO HCPCS SELF ADMINISTERED DRUGS (ALT 637 FOR MEDICARE OP): Performed by: NURSE PRACTITIONER

## 2025-05-08 PROCEDURE — 2500000004 HC RX 250 GENERAL PHARMACY W/ HCPCS (ALT 636 FOR OP/ED): Performed by: NURSE PRACTITIONER

## 2025-05-08 PROCEDURE — 80048 BASIC METABOLIC PNL TOTAL CA: CPT | Performed by: NURSE PRACTITIONER

## 2025-05-08 PROCEDURE — 2500000001 HC RX 250 WO HCPCS SELF ADMINISTERED DRUGS (ALT 637 FOR MEDICARE OP): Performed by: PHARMACIST

## 2025-05-08 PROCEDURE — 85027 COMPLETE CBC AUTOMATED: CPT | Performed by: NURSE PRACTITIONER

## 2025-05-08 PROCEDURE — 2500000001 HC RX 250 WO HCPCS SELF ADMINISTERED DRUGS (ALT 637 FOR MEDICARE OP): Performed by: PHYSICIAN ASSISTANT

## 2025-05-08 PROCEDURE — 82947 ASSAY GLUCOSE BLOOD QUANT: CPT

## 2025-05-08 RX ORDER — AMLODIPINE BESYLATE 5 MG/1
5 TABLET ORAL DAILY
Status: DISCONTINUED | OUTPATIENT
Start: 2025-05-08 | End: 2025-05-09 | Stop reason: HOSPADM

## 2025-05-08 RX ORDER — LOSARTAN POTASSIUM 100 MG/1
100 TABLET ORAL DAILY
Start: 2025-05-09

## 2025-05-08 RX ORDER — AMLODIPINE BESYLATE 5 MG/1
5 TABLET ORAL DAILY
Start: 2025-05-09

## 2025-05-08 RX ORDER — BISACODYL 10 MG/1
10 SUPPOSITORY RECTAL DAILY PRN
Status: DISCONTINUED | OUTPATIENT
Start: 2025-05-08 | End: 2025-05-09 | Stop reason: HOSPADM

## 2025-05-08 RX ORDER — LOSARTAN POTASSIUM 100 MG/1
100 TABLET ORAL DAILY
Status: DISCONTINUED | OUTPATIENT
Start: 2025-05-08 | End: 2025-05-09 | Stop reason: HOSPADM

## 2025-05-08 RX ORDER — FLUCONAZOLE 200 MG/1
800 TABLET ORAL EVERY 24 HOURS
Qty: 32 TABLET | Refills: 0 | Status: SHIPPED | OUTPATIENT
Start: 2025-05-09 | End: 2025-05-17

## 2025-05-08 RX ORDER — AMMONIUM LACTATE 12 G/100G
LOTION TOPICAL 2 TIMES DAILY
Start: 2025-05-08

## 2025-05-08 RX ORDER — PANTOPRAZOLE SODIUM 40 MG/1
40 TABLET, DELAYED RELEASE ORAL
Start: 2025-05-09

## 2025-05-08 RX ADMIN — Medication: at 21:12

## 2025-05-08 RX ADMIN — Medication 10 ML: at 14:49

## 2025-05-08 RX ADMIN — VENLAFAXINE 50 MG: 25 TABLET ORAL at 21:14

## 2025-05-08 RX ADMIN — ACETAMINOPHEN, CAFFEINE 2 TABLET: 500; 65 TABLET, FILM COATED ORAL at 14:49

## 2025-05-08 RX ADMIN — Medication 1 SPRAY: at 06:00

## 2025-05-08 RX ADMIN — LEVOTHYROXINE SODIUM 75 MCG: 0.07 TABLET ORAL at 05:56

## 2025-05-08 RX ADMIN — LISINOPRIL 25 MG: 20 TABLET ORAL at 08:29

## 2025-05-08 RX ADMIN — Medication 1 TABLET: at 21:13

## 2025-05-08 RX ADMIN — NYSTATIN 1 APPLICATION: 100000 POWDER TOPICAL at 21:12

## 2025-05-08 RX ADMIN — BISACODYL 5 MG: 5 TABLET, COATED ORAL at 21:13

## 2025-05-08 RX ADMIN — Medication 10 ML: at 05:59

## 2025-05-08 RX ADMIN — Medication 1 SPRAY: at 17:22

## 2025-05-08 RX ADMIN — INSULIN LISPRO 2 UNITS: 100 INJECTION, SOLUTION INTRAVENOUS; SUBCUTANEOUS at 12:31

## 2025-05-08 RX ADMIN — WHITE PETROLATUM: 1.75 OINTMENT TOPICAL at 21:14

## 2025-05-08 RX ADMIN — INSULIN GLARGINE 10 UNITS: 100 INJECTION, SOLUTION SUBCUTANEOUS at 08:30

## 2025-05-08 RX ADMIN — ENOXAPARIN SODIUM 40 MG: 40 INJECTION SUBCUTANEOUS at 08:29

## 2025-05-08 RX ADMIN — ONDANSETRON 4 MG: 4 TABLET, ORALLY DISINTEGRATING ORAL at 19:07

## 2025-05-08 RX ADMIN — BUSPIRONE HYDROCHLORIDE 10 MG: 10 TABLET ORAL at 08:29

## 2025-05-08 RX ADMIN — ACETAMINOPHEN 650 MG: 325 TABLET ORAL at 17:48

## 2025-05-08 RX ADMIN — ACETAMINOPHEN 650 MG: 325 TABLET ORAL at 05:56

## 2025-05-08 RX ADMIN — ATORVASTATIN CALCIUM 10 MG: 10 TABLET, FILM COATED ORAL at 21:13

## 2025-05-08 RX ADMIN — AMITRIPTYLINE HYDROCHLORIDE 10 MG: 10 TABLET, FILM COATED ORAL at 21:14

## 2025-05-08 RX ADMIN — PREGABALIN 50 MG: 50 CAPSULE ORAL at 08:29

## 2025-05-08 RX ADMIN — PANTOPRAZOLE SODIUM 40 MG: 40 TABLET, DELAYED RELEASE ORAL at 06:00

## 2025-05-08 RX ADMIN — Medication 1 TABLET: at 08:29

## 2025-05-08 RX ADMIN — PREGABALIN 50 MG: 50 CAPSULE ORAL at 21:13

## 2025-05-08 RX ADMIN — LOSARTAN POTASSIUM 100 MG: 100 TABLET, FILM COATED ORAL at 12:31

## 2025-05-08 RX ADMIN — Medication 1 SPRAY: at 21:12

## 2025-05-08 RX ADMIN — NYSTATIN 1 APPLICATION: 100000 POWDER TOPICAL at 08:29

## 2025-05-08 RX ADMIN — AMLODIPINE BESYLATE 5 MG: 5 TABLET ORAL at 12:31

## 2025-05-08 RX ADMIN — BUSPIRONE HYDROCHLORIDE 10 MG: 10 TABLET ORAL at 21:13

## 2025-05-08 RX ADMIN — FLUCONAZOLE 800 MG: 200 TABLET ORAL at 12:31

## 2025-05-08 RX ADMIN — Medication: at 08:29

## 2025-05-08 RX ADMIN — INSULIN LISPRO 1 UNITS: 100 INJECTION, SOLUTION INTRAVENOUS; SUBCUTANEOUS at 17:22

## 2025-05-08 RX ADMIN — Medication 1 SPRAY: at 12:32

## 2025-05-08 ASSESSMENT — COGNITIVE AND FUNCTIONAL STATUS - GENERAL
WALKING IN HOSPITAL ROOM: TOTAL
TURNING FROM BACK TO SIDE WHILE IN FLAT BAD: A LOT
PERSONAL GROOMING: A LITTLE
HELP NEEDED FOR BATHING: TOTAL
MOBILITY SCORE: 8
DRESSING REGULAR LOWER BODY CLOTHING: TOTAL
CLIMB 3 TO 5 STEPS WITH RAILING: TOTAL
DRESSING REGULAR UPPER BODY CLOTHING: TOTAL
EATING MEALS: A LITTLE
DAILY ACTIVITIY SCORE: 10
STANDING UP FROM CHAIR USING ARMS: TOTAL
MOVING FROM LYING ON BACK TO SITTING ON SIDE OF FLAT BED WITH BEDRAILS: A LOT
TOILETING: TOTAL
MOVING TO AND FROM BED TO CHAIR: TOTAL

## 2025-05-08 ASSESSMENT — PAIN SCALES - GENERAL
PAINLEVEL_OUTOF10: 0 - NO PAIN
PAINLEVEL_OUTOF10: 9
PAINLEVEL_OUTOF10: 7
PAINLEVEL_OUTOF10: 9

## 2025-05-08 ASSESSMENT — PAIN - FUNCTIONAL ASSESSMENT
PAIN_FUNCTIONAL_ASSESSMENT: 0-10

## 2025-05-08 ASSESSMENT — PAIN DESCRIPTION - LOCATION: LOCATION: BACK

## 2025-05-08 ASSESSMENT — PAIN DESCRIPTION - ORIENTATION: ORIENTATION: UPPER

## 2025-05-08 NOTE — CARE PLAN
EOS note: Pt oriented x3 and pleasant. Forgetful at times. Continuing to give oral antifungal as ordered. Ate all meals, insulin given as needed with sliding scale coverage. Tylenol given for pain. One time dose of excedrin given for migraine earlier. PT turned q2h as tolerated. Purewick is in place. BP elevated, patient BP medication adjusted.    Pt resting at this time. Bed is in lowest position and locked with alarm on. Call light and personal possesions are within reach.

## 2025-05-08 NOTE — NURSING NOTE
Visited with pt. She tells me she is feeling better. On discharge , she will be transferred to a skilled facility ( she had been in assisted living ). Sepsis home going reviewed with pt.

## 2025-05-08 NOTE — PROGRESS NOTES
"Lisa Vu is a 64 y.o. female on day 6 of admission presenting with Toxic metabolic encephalopathy, noted to have fungemia, was started on Diflucan.      Subjective   No CP       Objective   Lying in bed, no acute distress    Current Medications[1]       Physical Exam  Constitutional:       Comments: Awake and alert   HENT:      Head: Normocephalic.   Eyes:      Conjunctiva/sclera: Conjunctivae normal.   Cardiovascular:      Rate and Rhythm: Normal rate and regular rhythm.   Pulmonary:      Breath sounds: Normal breath sounds.   Abdominal:      General: Bowel sounds are normal.      Palpations: Abdomen is soft.   Musculoskeletal:      Comments: No edema   Skin:     General: Skin is dry.   Neurological:      Mental Status: Mental status is at baseline.           Last Recorded Vitals  Blood pressure 162/83, pulse 58, temperature 36.5 °C (97.7 °F), temperature source Temporal, resp. rate 14, height 1.727 m (5' 8\"), weight 101 kg (221 lb 9.6 oz), SpO2 97%.  Intake/Output last 3 Shifts:  I/O last 3 completed shifts:  In: 1120 (11.1 mL/kg) [P.O.:720; IV Piggyback:400]  Out: 2100 (20.9 mL/kg) [Urine:2100 (0.6 mL/kg/hr)]  Weight: 100.5 kg           Labs:       Results for orders placed or performed during the hospital encounter of 05/02/25 (from the past 24 hours)   POCT GLUCOSE   Result Value Ref Range    POCT Glucose 303 (H) 74 - 99 mg/dL   POCT GLUCOSE   Result Value Ref Range    POCT Glucose 247 (H) 74 - 99 mg/dL   POCT GLUCOSE   Result Value Ref Range    POCT Glucose 228 (H) 74 - 99 mg/dL   CBC   Result Value Ref Range    WBC 8.2 4.4 - 11.3 x10*3/uL    nRBC 0.0 0.0 - 0.0 /100 WBCs    RBC 4.19 4.00 - 5.20 x10*6/uL    Hemoglobin 11.4 (L) 12.0 - 16.0 g/dL    Hematocrit 37.7 36.0 - 46.0 %    MCV 90 80 - 100 fL    MCH 27.2 26.0 - 34.0 pg    MCHC 30.2 (L) 32.0 - 36.0 g/dL    RDW 16.3 (H) 11.5 - 14.5 %    Platelets 223 150 - 450 x10*3/uL   Basic metabolic panel   Result Value Ref Range    Glucose 172 (H) 74 - 99 mg/dL "    Sodium 140 136 - 145 mmol/L    Potassium 4.1 3.5 - 5.3 mmol/L    Chloride 107 98 - 107 mmol/L    Bicarbonate 25 21 - 32 mmol/L    Anion Gap 12 10 - 20 mmol/L    Urea Nitrogen 13 6 - 23 mg/dL    Creatinine 0.63 0.50 - 1.05 mg/dL    eGFR >90 >60 mL/min/1.73m*2    Calcium 8.4 (L) 8.6 - 10.3 mg/dL   Magnesium   Result Value Ref Range    Magnesium 1.76 1.60 - 2.40 mg/dL   POCT GLUCOSE   Result Value Ref Range    POCT Glucose 148 (H) 74 - 99 mg/dL        Radiology  US renal complete  Result Date: 5/5/2025  Hydronephrosis has resolved. No hydronephrosis is noted bilaterally. There is posterior wall thickening verse layering debris along the right UVJ within the urinary bladder. Recommend correlation with patient's urinalysis. Consider cystoscopy.   MACRO: None   Signed by: Shaheen Pinon 5/5/2025 5:22 PM Dictation workstation:   [id number]    CT abdomen pelvis w IV contrast  Result Date: 5/4/2025  1.  Mild right-sided hydroureteronephrosis with diffuse ureteral wall thickening and periureteral fat stranding throughout its course to the level of vesicoureteral junction. These findings raise concern for infectious/inflammatory ureteritis. No obvious distal obstructing ureteral calculus is noted on the CT examination. There is also diffuse posterior wall thickening of the urinary bladder extending to level of bilateral vesicoureteral junctions. Constellation of these findings raise concern for cystitis with ascending ureteritis/pyelitis. No significant left-sided ureteral or pelvic dilatation/ureteral inflammation is noted at this point in time. Recommend clinical and laboratory correlation for cystitis. This asymmetric posterior wall thickening of the urinary bladder is most likely favored to be related to cystitis, however other possibilities including neoplastic process can not be completely excluded. Recommend urologic consultation and further evaluation as clinically warranted. 2. Findings suggestive of prior  bariatric surgery. There is intraluminal hyperdensity in the distal esophagus extending to the gastric pouch with slow trickle of this intraluminal hyperdensity into the rest of the stomach which connects with the duodenum. These findings could be related to prior surgery (although the type of surgery is unclear). Recommend correlation with type of surgery and patient's symptomatology associated with these findings. 3. Mild diffuse circumferential thickening of the distal thoracic esophagus with suggestion of small hiatal hernia. These findings are concerning for reflux. Recommend correlation with patient's symptomatology. Gastroenterology consultation and further evaluation with upper GI endoscopy can be performed for further evaluation as clinically warranted. 4. Small bilateral pleural effusions. Mild subpleural ground-glass opacities in the dependent region of bilateral lower lobes are likely favored to represent dependent atelectasis, however developing pneumonia can also be considered in the differential in appropriate clinical setting. 5. Nodular thickening of the left adrenal gland with the prominent nodule measuring up to 1.1 cm as described above. Recommend further evaluation with a dedicated adrenal mass protocol.   MACRO: Critical Finding:  See findings. Notification was initiated on 5/4/2025 at 12:56 pm by Dr. Mercedes Jones.  (**-YCF-**) Instructions:   Signed by: Mercedes Jones 5/4/2025 6:04 PM Dictation workstation:   [id number]    XR abdomen 1 view  Result Date: 5/4/2025  Gaseous distention of small-bowel loops again seen, most likely due to ileus. No evidence of bowel obstruction. Follow-up as needed.   Signed by: Frank Armendariz 5/4/2025 12:41 PM Dictation workstation:   [id number]    XR chest 1 view  Result Date: 5/3/2025  1.  No radiographic evidence of acute cardiopulmonary disease. No focal consolidation or alveolar edema.       MACRO: None   Signed by: Herrera Nelson 5/3/2025 10:07 PM  Dictation workstation:   [id number]    XR abdomen 1 view  Result Date: 5/3/2025  Persistent gaseous distention of small-bowel loops, most likely due to ileus. Follow-up as needed.   Signed by: Frank Armendariz 5/3/2025 10:18 AM Dictation workstation:   [id number]    XR abdomen 1 view  Result Date: 5/2/2025  Dilated small bowel loop in the left abdomen measuring 3.7 cm in diameter may be secondary to ileus, however if there is persistent concern for acute abdominal pathology, CT is recommended for further evaluation.   MACRO: None   Signed by: Erik Morton 5/2/2025 8:45 PM Dictation workstation:   [id number]    CT brain attack head wo IV contrast  Result Date: 5/2/2025  No evidence of acute cortical infarct or intracranial hemorrhage. Volume loss. Degree does appear to be advanced for age. If persistent concern, consider MRI   MACRO: Anirudh Gabriel message by epic leti SYLVESTERSuo Yi on 5/2/2025 at 6:09 pm.  (**-RCF-**) Findings:  See findings.     Signed by: Anirudh Gabriel 5/2/2025 6:10 PM Dictation workstation:   [id number]    CT brain attack angio head and neck W and WO IV contrast  Result Date: 5/2/2025  1. CTA of Neck arteries demonstrates no significant flow-limiting stenosis or dissection. 2. CTA of Intracranial arteries demonstrates no evidence for large vessel occlusion or other acute findings. 3. Multifocal mild atherosclerosis, without evidence for hemodynamically significant stenosis.   MACRO: None   Signed by: Ted Vera 5/2/2025 6:08 PM Dictation workstation:   [id number]        Assessment/Plan   Assessment & Plan  Toxic metabolic encephalopathy     Cystitis with hematuria     Hypovolemia dehydration     Severe sepsis with acute organ dysfunction (Multi)     Fungemia        Hydroureteronephrosis    Hydronephrosis      Right pyelonephritis      AMS, due to metabolic encephalopathy      PLAN: Patient is slowly improving, c/w Diflucan, med list and labs reviewed, discussed with nursing and CNP, SS  is working on a DC plan, okay to ND when bed available, follow closely.               Sushil Orozco MD           [1]   Current Facility-Administered Medications:     acetaminophen (Tylenol) tablet 650 mg, 650 mg, oral, q4h PRN, 650 mg at 05/08/25 0556 **OR** acetaminophen (Tylenol) oral liquid 650 mg, 650 mg, oral, q4h PRN **OR** acetaminophen (Tylenol) suppository 650 mg, 650 mg, rectal, q4h PRN, PEARL Lara-CNP    amitriptyline (Elavil) tablet 10 mg, 10 mg, oral, Nightly, PEARL Lara-CNP, 10 mg at 05/07/25 2058    ammonium lactate (Lac-Hydrin) 12 % lotion, , Topical, BID, PEARL Lara-CNP, Given at 05/08/25 0829    atorvastatin (Lipitor) tablet 10 mg, 10 mg, oral, Nightly, PEARL Lara-CNP, 10 mg at 05/07/25 2058    bisacodyl (Dulcolax) EC tablet 5 mg, 5 mg, oral, Nightly, PEARL Lara-CNP, 5 mg at 05/06/25 2217    bisacodyl (Dulcolax) suppository 10 mg, 10 mg, rectal, Daily, Savita Mckeon, APRN-CNP, 10 mg at 05/03/25 1125    busPIRone (Buspar) tablet 10 mg, 10 mg, oral, BID, PEARL Lara-CNP, 10 mg at 05/08/25 0829    dextrose 50 % injection 12.5 g, 12.5 g, intravenous, q15 min PRN, Lillian Ahumada APRN-CNP    dextrose 50 % injection 25 g, 25 g, intravenous, q15 min PRN, PEARL Lara-CNP    [Held by provider] dicyclomine (Bentyl) tablet 20 mg, 20 mg, oral, Before meals & nightly, Lillian Ahumada APRCHRISTIE-CNP    [Held by provider] empagliflozin (Jardiance) tablet 25 mg, 25 mg, oral, Daily, ANGELINA Lara    enoxaparin (Lovenox) syringe 40 mg, 40 mg, subcutaneous, q24h, ANGELINA Lara, 40 mg at 05/08/25 0829    fluconazole (Diflucan) tablet 800 mg, 800 mg, oral, q24h, Isa Carcamo, PharmD    [Held by provider] glimepiride (Amaryl) tablet 4 mg, 4 mg, oral, BID, ANGELINA Lara, 4 mg at 05/03/25 1842    glucagon (Glucagen) injection 1 mg, 1 mg, intramuscular, q15 min PRN, Lillian Ahumada,  APRN-CNP    glucagon (Glucagen) injection 1 mg, 1 mg, intramuscular, q15 min PRN, Lillian Ahumada APRN-CNP    glycerin (Adult) 2 g suppository 1 suppository, 1 suppository, rectal, Daily PRN, Lillian Ahumada APRN-CNP    insulin glargine (Lantus) injection 10 Units, 10 Units, subcutaneous, Daily, PEARL Lara-CNP, 10 Units at 05/08/25 0830    insulin lispro injection 0-5 Units, 0-5 Units, subcutaneous, TID AC, Lillian Ahumada APRN-CNP, 2 Units at 05/07/25 1733    lactobacillus acidophilus tablet 1 tablet, 1 tablet, oral, BID, Lillian Ahumada APRN-CNP, 1 tablet at 05/08/25 0829    levothyroxine (Synthroid, Levoxyl) tablet 75 mcg, 75 mcg, oral, Daily, PEARL Lara-CNP, 75 mcg at 05/08/25 0556    lisinopril tablet 25 mg, 25 mg, oral, Daily, Beulah Gimenez, APRN-CNP, 25 mg at 05/08/25 0829    melatonin tablet 3 mg, 3 mg, oral, Nightly PRN, Lillian Ahumada APRCHRISTIE-CNP, 3 mg at 05/05/25 2057    metoprolol succinate XL (Toprol-XL) 24 hr tablet 25 mg, 25 mg, oral, Daily, Lillian Ahumada APRN-CNP, 25 mg at 05/05/25 0935    moisturizing mouth (Biotene Oral Dry Mouth) solution 1 spray, 1 spray, oral, Before meals & nightly, Lillian Ahumada APRN-CNP, 1 spray at 05/08/25 0600    moisturizing mouth (Biotene Oral Dry Mouth) solution 1 spray, 1 spray, oral, PRN, Lillian hAumada APRN-CNP, 1 spray at 05/05/25 0523    nystatin (Mycostatin) 100,000 unit/gram powder 1 Application, 1 Application, Topical, BID, Lillian Ahumada APRN-CNP, 1 Application at 05/08/25 0829    ondansetron ODT (Zofran-ODT) disintegrating tablet 4 mg, 4 mg, oral, q8h PRN **OR** ondansetron (Zofran) injection 4 mg, 4 mg, intravenous, q8h PRN, PEARL Lara-CNP, 4 mg at 05/07/25 1255    pantoprazole (ProtoNix) EC tablet 40 mg, 40 mg, oral, Daily before breakfast, Norbert Rai PA-C, 40 mg at 05/08/25 0600    polyethylene glycol (Glycolax, Miralax) packet 17 g, 17 g, oral, Daily, Lillian L Orleman,  APRN-CNP, 17 g at 05/06/25 0850    pregabalin (Lyrica) capsule 50 mg, 50 mg, oral, BID, ANGELINA Lara, 50 mg at 05/08/25 0829    sodium chloride 0.9% flush 10 mL, 10 mL, intravenous, q8h BAILEE, ANGELINA Lara, 10 mL at 05/08/25 0559    venlafaxine (Effexor) tablet 50 mg, 50 mg, oral, Nightly, ANGELNIA Lara, 50 mg at 05/07/25 2058    white petrolatum (Aquaphor) ointment, , Topical, q1h PRN, ANGELINA Lara

## 2025-05-08 NOTE — CARE PLAN
The patient's goals for the shift include    Problem: Pain - Adult  Goal: Verbalizes/displays adequate comfort level or baseline comfort level  Outcome: Progressing     Problem: Safety - Adult  Goal: Free from fall injury  Outcome: Progressing     Problem: Discharge Planning  Goal: Discharge to home or other facility with appropriate resources  Outcome: Progressing     Problem: Chronic Conditions and Co-morbidities  Goal: Patient's chronic conditions and co-morbidity symptoms are monitored and maintained or improved  Outcome: Progressing     Problem: Nutrition  Goal: Nutrient intake appropriate for maintaining nutritional needs  Outcome: Progressing     Problem: Fall/Injury  Goal: Not fall by end of shift  Outcome: Progressing  Goal: Be free from injury by end of the shift  Outcome: Progressing  Goal: Verbalize understanding of personal risk factors for fall in the hospital  Outcome: Progressing  Goal: Verbalize understanding of risk factor reduction measures to prevent injury from fall in the home  Outcome: Progressing  Goal: Pace activities to prevent fatigue by end of the shift  Outcome: Progressing     Problem: Skin  Goal: Decreased wound size/increased tissue granulation at next dressing change  Outcome: Progressing  Goal: Participates in plan/prevention/treatment measures  Outcome: Progressing  Goal: Prevent/manage excess moisture  Outcome: Progressing  Goal: Prevent/minimize sheer/friction injuries  Outcome: Progressing  Goal: Promote/optimize nutrition  Outcome: Progressing  Goal: Promote skin healing  Outcome: Progressing     Problem: Infection prevention/bleeding  Goal: Infection s/sx managed  Outcome: Progressing  Goal: No further progression of infection  Outcome: Progressing  Goal: No signs of bleeding  Outcome: Progressing  Goal: Normal coagulation studies  Outcome: Progressing     Problem: Diabetes  Goal: Maintain glucose levels >70mg/dl to <250mg/dl throughout shift  Outcome: Progressing     Problem:  Perfusion/Cardiac  Goal: Adequate perfusion to organs/extremities  Outcome: Progressing  Goal: Hemodynamically stable  Outcome: Progressing  Goal: No cardiac arrhythmias  Outcome: Progressing     Problem: Respiratory/Oxygenation  Goal: No signs of respiratory compromise  Outcome: Progressing  Goal: Tolerates activity without increased O2 demands  Outcome: Progressing     Problem: Neuro/Coping  Goal: Minimal anxiety; utilize coping mechanisms  Outcome: Progressing  Goal: No signs of neurological compromise  Outcome: Progressing  Goal: Increase self care/family involvement  Outcome: Progressing     Problem: Fluid/Electrolyte/Nutrition  Goal: Fluid balance within 1 liter of normovolemia  Outcome: Progressing  Goal: No signs of renal failure  Outcome: Progressing  Goal: Normal electrolyte levels  Outcome: Progressing  Goal: Normal glucose levels  Outcome: Progressing  Goal: Tolerates nutritional intake  Outcome: Progressing       The clinical goals for the shift include see care plan.     EOS note: Pt remains A&Ox4; forgetful at times. On RA, Sinus tramaine  w/ first degree AVB on tele, on a carb consistent diet, purewick in place. Pt requested tylenol for pain 9/10 at 0550, administered at 0556. Pt resting comfortably in bed. Safety maintained.

## 2025-05-08 NOTE — PROGRESS NOTES
" INPATIENT PROGRESS NOTES    PATIENT NAME: Lisa Vu    MRN: [id number]  SERVICE DATE:  5/8/2025   SERVICE TIME:  11:00 AM    SIGNATURE: Clive López MD      ASSESSMENT :   -C.glabrata Fungemia. S-Fluc DD  -Right pyelonephritis  -Right hydronephrosis  -Acute encephalopathy  -Positive blood culture for staph capitis likely contamination  -Leukocytosis  -69 y/o female with CKD 2, dementia T2DM, recurrent UTI.     PLAN:  - Day# 6/14 of high-dose fluconazole  - Repeat blood culture NG  - Closely monitor for ATB side effects including rash, Diarrhea/CDI, thrombocytopenia, NAYELY, etc.  - Urology team is following    ID team will sign off please call back for any questions    SUBJECTIVE  Afebrile  No events overnight       OBJECTIVE  PHYSICAL EXAM:   Patient Vitals for the past 24 hrs:   BP Temp Temp src Pulse Resp SpO2 Weight   05/08/25 0800 162/83 36.5 °C (97.7 °F) Temporal 58 14 97 % --   05/08/25 0500 -- -- -- -- -- -- 101 kg (221 lb 9.6 oz)   05/08/25 0400 (!) 196/88 36.5 °C (97.7 °F) Temporal 56 16 99 % --   05/08/25 0000 159/73 36.1 °C (97 °F) Temporal 56 16 98 % --   05/07/25 2000 164/87 36.4 °C (97.5 °F) Temporal 51 16 94 % --   05/07/25 1600 -- 36.3 °C (97.3 °F) Temporal 60 18 98 % --   05/07/25 1529 (!) 186/74 -- -- -- -- -- --   05/07/25 1200 (!) 190/85 36 °C (96.8 °F) Temporal 57 16 98 % --         Gen: NAD  Neck: symmetric, no mass  Cardiovascular: RRR  Respiratory: No distress       Labs:  Lab Results   Component Value Date    WBC 8.2 05/08/2025    HGB 11.4 (L) 05/08/2025    HCT 37.7 05/08/2025    MCV 90 05/08/2025     05/08/2025     Lab Results   Component Value Date    GLUCOSE 172 (H) 05/08/2025    CALCIUM 8.4 (L) 05/08/2025     05/08/2025    K 4.1 05/08/2025    CO2 25 05/08/2025     05/08/2025    BUN 13 05/08/2025    CREATININE 0.63 05/08/2025   ESR: --No results found for: \"SEDRATE\"No results found for: \"CRP\"  Lab Results   Component Value Date    ALT 9 05/02/2025    AST 13 " "05/02/2025    ALKPHOS 70 05/02/2025    BILITOT 0.7 05/02/2025         DATA:   Diagnostic tests reviewed for today's visit:    Labs this admission reviewed  Imagings this admission reviewed  Cultures: Reviewed        Clive López MD.   Infectious Disease Attending            This note was partially created using voice recognition software and is inherently subject to errors including those of syntax and \"sound-alike\" substitutions which may escape proofreading. In such instances, original meaning may be extrapolated by contextual derivation       "

## 2025-05-08 NOTE — PROGRESS NOTES
05/08/25 1336   Discharge Planning   Home or Post Acute Services Post acute facilities (Rehab/SNF/etc)   Type of Post Acute Facility Services Long term care   Expected Discharge Disposition Inter  ([facility])     Plan remains for pt to return to [facility] at discharge. Requested updates were sent. SW to follow.

## 2025-05-09 VITALS
RESPIRATION RATE: 20 BRPM | HEIGHT: 68 IN | SYSTOLIC BLOOD PRESSURE: 128 MMHG | TEMPERATURE: 97.2 F | WEIGHT: 218.5 LBS | OXYGEN SATURATION: 99 % | DIASTOLIC BLOOD PRESSURE: 73 MMHG | HEART RATE: 64 BPM | BODY MASS INDEX: 33.12 KG/M2

## 2025-05-09 LAB
ANION GAP SERPL CALC-SCNC: 12 MMOL/L (ref 10–20)
BACTERIA BLD AEROBE CULT: ABNORMAL
BACTERIA BLD CULT: ABNORMAL
BACTERIA BLD CULT: NORMAL
BUN SERPL-MCNC: 12 MG/DL (ref 6–23)
CALCIUM SERPL-MCNC: 8.6 MG/DL (ref 8.6–10.3)
CHLORIDE SERPL-SCNC: 105 MMOL/L (ref 98–107)
CO2 SERPL-SCNC: 28 MMOL/L (ref 21–32)
CREAT SERPL-MCNC: 0.64 MG/DL (ref 0.5–1.05)
EGFRCR SERPLBLD CKD-EPI 2021: >90 ML/MIN/1.73M*2
ERYTHROCYTE [DISTWIDTH] IN BLOOD BY AUTOMATED COUNT: 16.4 % (ref 11.5–14.5)
GLUCOSE BLD MANUAL STRIP-MCNC: 156 MG/DL (ref 74–99)
GLUCOSE BLD MANUAL STRIP-MCNC: 219 MG/DL (ref 74–99)
GLUCOSE BLD MANUAL STRIP-MCNC: 259 MG/DL (ref 74–99)
GLUCOSE SERPL-MCNC: 184 MG/DL (ref 74–99)
GRAM STN SPEC: ABNORMAL
GRAM STN SPEC: ABNORMAL
HCT VFR BLD AUTO: 40 % (ref 36–46)
HGB BLD-MCNC: 11.8 G/DL (ref 12–16)
MAGNESIUM SERPL-MCNC: 1.62 MG/DL (ref 1.6–2.4)
MCH RBC QN AUTO: 27.1 PG (ref 26–34)
MCHC RBC AUTO-ENTMCNC: 29.5 G/DL (ref 32–36)
MCV RBC AUTO: 92 FL (ref 80–100)
NRBC BLD-RTO: 0 /100 WBCS (ref 0–0)
PLATELET # BLD AUTO: 310 X10*3/UL (ref 150–450)
POTASSIUM SERPL-SCNC: 3.9 MMOL/L (ref 3.5–5.3)
RBC # BLD AUTO: 4.35 X10*6/UL (ref 4–5.2)
SODIUM SERPL-SCNC: 141 MMOL/L (ref 136–145)
WBC # BLD AUTO: 9.7 X10*3/UL (ref 4.4–11.3)

## 2025-05-09 PROCEDURE — 2500000004 HC RX 250 GENERAL PHARMACY W/ HCPCS (ALT 636 FOR OP/ED): Performed by: NURSE PRACTITIONER

## 2025-05-09 PROCEDURE — 82947 ASSAY GLUCOSE BLOOD QUANT: CPT

## 2025-05-09 PROCEDURE — 83735 ASSAY OF MAGNESIUM: CPT | Performed by: NURSE PRACTITIONER

## 2025-05-09 PROCEDURE — 2500000004 HC RX 250 GENERAL PHARMACY W/ HCPCS (ALT 636 FOR OP/ED): Mod: JZ | Performed by: NURSE PRACTITIONER

## 2025-05-09 PROCEDURE — 87040 BLOOD CULTURE FOR BACTERIA: CPT | Mod: STJLAB

## 2025-05-09 PROCEDURE — 2500000002 HC RX 250 W HCPCS SELF ADMINISTERED DRUGS (ALT 637 FOR MEDICARE OP, ALT 636 FOR OP/ED): Performed by: NURSE PRACTITIONER

## 2025-05-09 PROCEDURE — 36415 COLL VENOUS BLD VENIPUNCTURE: CPT | Performed by: NURSE PRACTITIONER

## 2025-05-09 PROCEDURE — 36415 COLL VENOUS BLD VENIPUNCTURE: CPT

## 2025-05-09 PROCEDURE — 2500000001 HC RX 250 WO HCPCS SELF ADMINISTERED DRUGS (ALT 637 FOR MEDICARE OP): Performed by: NURSE PRACTITIONER

## 2025-05-09 PROCEDURE — 85027 COMPLETE CBC AUTOMATED: CPT | Performed by: NURSE PRACTITIONER

## 2025-05-09 PROCEDURE — 2500000001 HC RX 250 WO HCPCS SELF ADMINISTERED DRUGS (ALT 637 FOR MEDICARE OP): Performed by: PHARMACIST

## 2025-05-09 PROCEDURE — 2500000001 HC RX 250 WO HCPCS SELF ADMINISTERED DRUGS (ALT 637 FOR MEDICARE OP): Performed by: PHYSICIAN ASSISTANT

## 2025-05-09 PROCEDURE — 80048 BASIC METABOLIC PNL TOTAL CA: CPT | Performed by: NURSE PRACTITIONER

## 2025-05-09 RX ORDER — MAGNESIUM SULFATE HEPTAHYDRATE 40 MG/ML
2 INJECTION, SOLUTION INTRAVENOUS ONCE
Status: COMPLETED | OUTPATIENT
Start: 2025-05-09 | End: 2025-05-09

## 2025-05-09 RX ORDER — POTASSIUM CHLORIDE 20 MEQ/1
20 TABLET, EXTENDED RELEASE ORAL ONCE
Status: COMPLETED | OUTPATIENT
Start: 2025-05-09 | End: 2025-05-09

## 2025-05-09 RX ADMIN — BUSPIRONE HYDROCHLORIDE 10 MG: 10 TABLET ORAL at 20:34

## 2025-05-09 RX ADMIN — AMITRIPTYLINE HYDROCHLORIDE 10 MG: 10 TABLET, FILM COATED ORAL at 20:34

## 2025-05-09 RX ADMIN — ACETAMINOPHEN 650 MG: 325 TABLET ORAL at 08:16

## 2025-05-09 RX ADMIN — INSULIN LISPRO 2 UNITS: 100 INJECTION, SOLUTION INTRAVENOUS; SUBCUTANEOUS at 12:07

## 2025-05-09 RX ADMIN — Medication: at 08:25

## 2025-05-09 RX ADMIN — PREGABALIN 50 MG: 50 CAPSULE ORAL at 08:17

## 2025-05-09 RX ADMIN — LEVOTHYROXINE SODIUM 75 MCG: 0.07 TABLET ORAL at 06:51

## 2025-05-09 RX ADMIN — PREGABALIN 50 MG: 50 CAPSULE ORAL at 20:34

## 2025-05-09 RX ADMIN — INSULIN LISPRO 3 UNITS: 100 INJECTION, SOLUTION INTRAVENOUS; SUBCUTANEOUS at 18:15

## 2025-05-09 RX ADMIN — INSULIN GLARGINE 10 UNITS: 100 INJECTION, SOLUTION SUBCUTANEOUS at 08:17

## 2025-05-09 RX ADMIN — Medication 1 TABLET: at 20:34

## 2025-05-09 RX ADMIN — INSULIN LISPRO 1 UNITS: 100 INJECTION, SOLUTION INTRAVENOUS; SUBCUTANEOUS at 08:17

## 2025-05-09 RX ADMIN — ATORVASTATIN CALCIUM 10 MG: 10 TABLET, FILM COATED ORAL at 20:34

## 2025-05-09 RX ADMIN — BUSPIRONE HYDROCHLORIDE 10 MG: 10 TABLET ORAL at 08:17

## 2025-05-09 RX ADMIN — METOPROLOL SUCCINATE 25 MG: 25 TABLET, EXTENDED RELEASE ORAL at 08:17

## 2025-05-09 RX ADMIN — Medication 10 ML: at 13:52

## 2025-05-09 RX ADMIN — Medication: at 20:36

## 2025-05-09 RX ADMIN — ACETAMINOPHEN 650 MG: 325 TABLET ORAL at 13:51

## 2025-05-09 RX ADMIN — LOSARTAN POTASSIUM 100 MG: 100 TABLET, FILM COATED ORAL at 08:16

## 2025-05-09 RX ADMIN — Medication 1 SPRAY: at 16:06

## 2025-05-09 RX ADMIN — BISACODYL 5 MG: 5 TABLET, COATED ORAL at 20:34

## 2025-05-09 RX ADMIN — POTASSIUM CHLORIDE 20 MEQ: 1500 TABLET, EXTENDED RELEASE ORAL at 08:17

## 2025-05-09 RX ADMIN — VENLAFAXINE 50 MG: 25 TABLET ORAL at 20:34

## 2025-05-09 RX ADMIN — AMLODIPINE BESYLATE 5 MG: 5 TABLET ORAL at 08:17

## 2025-05-09 RX ADMIN — ENOXAPARIN SODIUM 40 MG: 40 INJECTION SUBCUTANEOUS at 08:25

## 2025-05-09 RX ADMIN — MAGNESIUM SULFATE HEPTAHYDRATE 2 G: 40 INJECTION, SOLUTION INTRAVENOUS at 08:15

## 2025-05-09 RX ADMIN — POLYETHYLENE GLYCOL 3350 17 G: 17 POWDER, FOR SOLUTION ORAL at 08:15

## 2025-05-09 RX ADMIN — Medication 1 SPRAY: at 20:35

## 2025-05-09 RX ADMIN — ACETAMINOPHEN 650 MG: 325 TABLET ORAL at 18:49

## 2025-05-09 RX ADMIN — FLUCONAZOLE 800 MG: 200 TABLET ORAL at 12:07

## 2025-05-09 RX ADMIN — NYSTATIN 1 APPLICATION: 100000 POWDER TOPICAL at 20:35

## 2025-05-09 RX ADMIN — Medication 1 TABLET: at 08:16

## 2025-05-09 RX ADMIN — PANTOPRAZOLE SODIUM 40 MG: 40 TABLET, DELAYED RELEASE ORAL at 06:51

## 2025-05-09 RX ADMIN — Medication 10 ML: at 08:16

## 2025-05-09 ASSESSMENT — PAIN DESCRIPTION - ORIENTATION
ORIENTATION: RIGHT
ORIENTATION: RIGHT

## 2025-05-09 ASSESSMENT — COGNITIVE AND FUNCTIONAL STATUS - GENERAL
MOVING FROM LYING ON BACK TO SITTING ON SIDE OF FLAT BED WITH BEDRAILS: A LOT
STANDING UP FROM CHAIR USING ARMS: TOTAL
MOVING TO AND FROM BED TO CHAIR: TOTAL
TURNING FROM BACK TO SIDE WHILE IN FLAT BAD: A LOT
EATING MEALS: A LITTLE
PERSONAL GROOMING: A LITTLE
DAILY ACTIVITIY SCORE: 10
DRESSING REGULAR UPPER BODY CLOTHING: TOTAL
WALKING IN HOSPITAL ROOM: TOTAL
TOILETING: TOTAL
MOBILITY SCORE: 8
DRESSING REGULAR LOWER BODY CLOTHING: TOTAL
CLIMB 3 TO 5 STEPS WITH RAILING: TOTAL
HELP NEEDED FOR BATHING: TOTAL

## 2025-05-09 ASSESSMENT — PAIN DESCRIPTION - LOCATION
LOCATION: HIP
LOCATION: HIP

## 2025-05-09 ASSESSMENT — PAIN - FUNCTIONAL ASSESSMENT
PAIN_FUNCTIONAL_ASSESSMENT: 0-10

## 2025-05-09 ASSESSMENT — PAIN SCALES - GENERAL
PAINLEVEL_OUTOF10: 7
PAINLEVEL_OUTOF10: 7
PAINLEVEL_OUTOF10: 9
PAINLEVEL_OUTOF10: 9

## 2025-05-09 ASSESSMENT — PAIN DESCRIPTION - DESCRIPTORS: DESCRIPTORS: ACHING

## 2025-05-09 NOTE — PROGRESS NOTES
05/09/25 1509   Discharge Planning   Home or Post Acute Services Post acute facilities (Rehab/SNF/etc)   Type of Post Acute Facility Services Long term care   Expected Discharge Disposition Inter  ([facility])   What day is the transport expected? 05/09/25   What time is the transport expected? 0600   Intensity of Service   Intensity of Service 0-30 min     Pt cleared for return to [facility]. Pt and son aware of discharge and time. Facility was updated and discharge paperwork was provided. Nursing will call report prior to pt .

## 2025-05-09 NOTE — NURSING NOTE
End of shift note, no acute changes this shift. Patient medicated with tylenol for pain. Patient pericare complete. Patient to be discharged later today back to [facility]. Awaiting transport.

## 2025-05-09 NOTE — CARE PLAN
The patient's goals for the shift include sleep    The clinical goals for the shift include comfort and rest

## 2025-05-10 LAB
BACTERIA BLD AEROBE CULT: ABNORMAL
BACTERIA BLD AEROBE CULT: ABNORMAL
BACTERIA BLD CULT: ABNORMAL
BACTERIA BLD CULT: ABNORMAL
GRAM STN SPEC: ABNORMAL
GRAM STN SPEC: ABNORMAL

## 2025-05-10 NOTE — DISCHARGE SUMMARY
Discharge Diagnosis  Toxic metabolic encephalopathy, hydroureteronephrosis, right pyelonephritis,  fungemia           Issues Requiring Follow-Up  Toxic metabolic encephalopathy, hydroureteronephrosis, right pyelonephritis, fungemia  Discharge Meds     Medication List      START taking these medications     amLODIPine 5 mg tablet; Commonly known as: Norvasc; Take 1 tablet (5 mg)   by mouth once daily.   ammonium lactate 12 % lotion; Commonly known as: Lac-Hydrin; Apply   topically 2 times a day.   fluconazole 200 mg tablet; Commonly known as: Diflucan; Take 4 tablets   (800 mg) by mouth once every 24 hours for 8 days.   losartan 100 mg tablet; Commonly known as: Cozaar; Take 1 tablet (100   mg) by mouth once daily.   pantoprazole 40 mg EC tablet; Commonly known as: ProtoNix; Take 1 tablet   (40 mg) by mouth once daily in the morning. Take before meals. Do not   crush, chew, or split.     CHANGE how you take these medications     acetaminophen 325 mg tablet; Commonly known as: Tylenol; What changed:   Another medication with the same name was removed. Continue taking this   medication, and follow the directions you see here.   polyethylene glycol 17 gram packet; Commonly known as: Glycolax,   Miralax; What changed: Another medication with the same name was removed.   Continue taking this medication, and follow the directions you see here.     CONTINUE taking these medications     alum-mag hydroxide-simeth 200-200-20 mg/5 mL oral suspension; Commonly   known as: Mylanta   amitriptyline 10 mg tablet; Commonly known as: Elavil   atorvastatin 10 mg tablet; Commonly known as: Lipitor   busPIRone 10 mg tablet; Commonly known as: Buspar   docusate sodium 100 mg capsule; Commonly known as: Colace   Dulcolax (bisacodyl) 10 mg suppository; Generic drug: bisacodyl   Glucagon (HCl) Emergency Kit 1 mg recon soln; Generic drug: glucagon HCL   glycerin 2 g suppository; Commonly known as: Adult   HumaLOG KwikPen Insulin 100 unit/mL  pen; Generic drug: insulin lispro   Lantus U-100 Insulin 100 unit/mL injection; Generic drug: insulin   glargine   levothyroxine 75 mcg tablet; Commonly known as: Synthroid, Levoxyl   metoprolol succinate XL 25 mg 24 hr tablet; Commonly known as: Toprol-XL   * nystatin 100,000 unit/gram powder; Commonly known as: Mycostatin   * nystatin 100,000 unit/gram powder; Commonly known as: Mycostatin   pregabalin 50 mg capsule; Commonly known as: Lyrica   venlafaxine 50 mg tablet; Commonly known as: Effexor  * This list has 2 medication(s) that are the same as other medications   prescribed for you. Read the directions carefully, and ask your doctor or   other care provider to review them with you.     STOP taking these medications     dicyclomine 20 mg tablet; Commonly known as: Bentyl   famotidine 20 mg tablet; Commonly known as: Pepcid   Fleet Enema 19-7 gram/118 mL enema enema; Generic drug: sodium   phosphates   glimepiride 4 mg tablet; Commonly known as: Amaryl   guaiFENesin 100 mg/5 mL syrup; Commonly known as: Robitussin   Jardiance 25 mg tablet; Generic drug: empagliflozin   lisinopril 20 mg tablet   loperamide 2 mg capsule; Commonly known as: Imodium   magnesium hydroxide 400 mg/5 mL suspension; Commonly known as: Milk of   Magnesia   metoclopramide 10 mg tablet; Commonly known as: Reglan   ondansetron 4 mg tablet; Commonly known as: Zofran   Ozempic 0.25 mg or 0.5 mg(2 mg/1.5 mL) pen injector; Generic drug:   semaglutide   Triad Wound Dressing paste; Generic drug: wound dressing       Test Results Pending At Discharge  Pending Labs       Order Current Status    Blood Culture Preliminary result    Blood Culture Preliminary result    Blood Culture Preliminary result    Blood Culture Preliminary result            Hospital Course   Patient is a 64-year-old F from ECF with MMP including CVA, CKD, dementia, T2DM, frequent falls admitted due to change in mental status, noted to have hydroureteronephrosis, right  pyelonephritis, also noted to have fungemia, was evaluated by ID,  blood culture showed fungemia, was started on Diflucan, her condition slowly improving, is being DC'd to ECF for further care and follow-up as an outpatient.      Pertinent Physical Exam At Time of Discharge  Physical Exam  Constitutional:       Comments: Awake and alert   HENT:      Head: Normocephalic.   Eyes:      Conjunctiva/sclera: Conjunctivae normal.   Cardiovascular:      Rate and Rhythm: Regular rhythm.   Pulmonary:      Breath sounds: Normal breath sounds.   Abdominal:      General: Bowel sounds are normal.      Palpations: Abdomen is soft.   Musculoskeletal:         General: Normal range of motion.   Skin:     General: Skin is warm and dry.   Neurological:      General: No focal deficit present.         Outpatient Follow-Up  Follow-up with PCP      Sushil Orozco MD

## 2025-05-10 NOTE — NURSING NOTE
1900- This RN received report on this pt from ROWENA Oliveira.     2057- Pt picked up by Physicians at this time and discharged to [facility]. Vitals signs normal and no complaints of pain at the time of discharge.

## 2025-05-11 LAB
BACTERIA BLD AEROBE CULT: ABNORMAL
BACTERIA BLD CULT: ABNORMAL
BACTERIA BLD CULT: NORMAL
BACTERIA BLD CULT: NORMAL
GRAM STN SPEC: ABNORMAL

## 2025-05-13 LAB
BACTERIA BLD CULT: NORMAL
BACTERIA BLD CULT: NORMAL

## 2025-05-15 ENCOUNTER — NURSING HOME VISIT (OUTPATIENT)
Dept: POST ACUTE CARE | Facility: EXTERNAL LOCATION | Age: 65
End: 2025-05-15
Payer: COMMERCIAL

## 2025-05-15 DIAGNOSIS — N13.30 HYDROURETERONEPHROSIS: ICD-10-CM

## 2025-05-15 DIAGNOSIS — N13.30 HYDRONEPHROSIS, UNSPECIFIED HYDRONEPHROSIS TYPE: ICD-10-CM

## 2025-05-15 DIAGNOSIS — R68.89 FEELING UNWELL: ICD-10-CM

## 2025-05-15 DIAGNOSIS — A41.9 SEVERE SEPSIS WITH ACUTE ORGAN DYSFUNCTION (MULTI): ICD-10-CM

## 2025-05-15 DIAGNOSIS — N30.91 CYSTITIS WITH HEMATURIA: Primary | ICD-10-CM

## 2025-05-15 DIAGNOSIS — R65.20 SEVERE SEPSIS WITH ACUTE ORGAN DYSFUNCTION (MULTI): ICD-10-CM

## 2025-05-15 DIAGNOSIS — Z09 HOSPITAL DISCHARGE FOLLOW-UP: ICD-10-CM

## 2025-05-15 DIAGNOSIS — M79.89 LEG SWELLING: ICD-10-CM

## 2025-05-15 PROCEDURE — 99309 SBSQ NF CARE MODERATE MDM 30: CPT | Performed by: STUDENT IN AN ORGANIZED HEALTH CARE EDUCATION/TRAINING PROGRAM

## 2025-05-17 LAB
BACTERIA BLD AEROBE CULT: ABNORMAL
BACTERIA BLD CULT: ABNORMAL
GRAM STN SPEC: ABNORMAL
GRAM STN SPEC: ABNORMAL

## 2025-05-17 ASSESSMENT — ENCOUNTER SYMPTOMS
RESPIRATORY NEGATIVE: 1
WEAKNESS: 1
FATIGUE: 1
MUSCULOSKELETAL NEGATIVE: 1
PSYCHIATRIC NEGATIVE: 1
GASTROINTESTINAL NEGATIVE: 1

## 2025-05-17 NOTE — PROGRESS NOTES
Subjective   Patient ID: Lisa Vu is a 64 y.o. female.    Patient seen and examined on hospital follow-up.  Recently, was admitted to the hospital due to sepsis, and altered mental status, has significantly improved supportive care, however she feel unwell this morning. She regards that she is having significnat leg pain and swelling int he RLE. otherwise regards no constitutional symptoms and regards no fevers chills or urinary issues. otherwise feels overall well.         Review of Systems   Constitutional:  Positive for fatigue.   HENT: Negative.     Respiratory: Negative.     Cardiovascular:  Positive for leg swelling.   Gastrointestinal: Negative.    Musculoskeletal: Negative.    Neurological:  Positive for weakness.   Psychiatric/Behavioral: Negative.         Objective Vitals Reviewed via facility EMR   Physical Exam  Constitutional:       General: She is not in acute distress.     Appearance: She is not ill-appearing.   Eyes:      Pupils: Pupils are equal, round, and reactive to light.   Cardiovascular:      Rate and Rhythm: Normal rate and regular rhythm.      Pulses: Normal pulses.      Heart sounds: No murmur heard.  Pulmonary:      Effort: No respiratory distress.      Breath sounds: No wheezing.   Abdominal:      General: Abdomen is flat. Bowel sounds are normal. There is no distension.   Musculoskeletal:         General: Tenderness present.      Right lower leg: Edema present.      Left lower leg: No edema.   Skin:     General: Skin is warm and dry.   Neurological:      Mental Status: She is alert. Mental status is at baseline.      Cranial Nerves: No cranial nerve deficit.      Motor: Weakness present.   Psychiatric:         Mood and Affect: Mood normal.         Behavior: Behavior normal.         Assessment/Plan   Diagnoses and all orders for this visit:  Cystitis with hematuria  Hydronephrosis, unspecified hydronephrosis type  Hydroureteronephrosis  Severe sepsis with acute organ dysfunction  (Multi)  Leg swelling  Hospital discharge follow-up  Feeling unwell      Patient seen and examined, hospital course reviewed and reconciled. does have some significant pain in her RLE and swelling, recommend DVT scan and stat labs. continue supportive care. discussed care plan with staff.     Reviewed and approved by FREDDY NAZARIO on 5/17/25 at 11:05 AM.